# Patient Record
Sex: FEMALE | Race: WHITE | Employment: OTHER | URBAN - METROPOLITAN AREA
[De-identification: names, ages, dates, MRNs, and addresses within clinical notes are randomized per-mention and may not be internally consistent; named-entity substitution may affect disease eponyms.]

---

## 2020-07-06 ENCOUNTER — OFFICE VISIT (OUTPATIENT)
Dept: ORTHOPEDIC SURGERY | Age: 70
End: 2020-07-06
Payer: MEDICARE

## 2020-07-06 VITALS — TEMPERATURE: 98 F | HEIGHT: 66 IN | BODY MASS INDEX: 27.32 KG/M2 | WEIGHT: 170 LBS

## 2020-07-06 PROCEDURE — G8427 DOCREV CUR MEDS BY ELIG CLIN: HCPCS | Performed by: ORTHOPAEDIC SURGERY

## 2020-07-06 PROCEDURE — 4040F PNEUMOC VAC/ADMIN/RCVD: CPT | Performed by: ORTHOPAEDIC SURGERY

## 2020-07-06 PROCEDURE — G8400 PT W/DXA NO RESULTS DOC: HCPCS | Performed by: ORTHOPAEDIC SURGERY

## 2020-07-06 PROCEDURE — G8417 CALC BMI ABV UP PARAM F/U: HCPCS | Performed by: ORTHOPAEDIC SURGERY

## 2020-07-06 PROCEDURE — 1123F ACP DISCUSS/DSCN MKR DOCD: CPT | Performed by: ORTHOPAEDIC SURGERY

## 2020-07-06 PROCEDURE — 3017F COLORECTAL CA SCREEN DOC REV: CPT | Performed by: ORTHOPAEDIC SURGERY

## 2020-07-06 PROCEDURE — 99203 OFFICE O/P NEW LOW 30 MIN: CPT | Performed by: ORTHOPAEDIC SURGERY

## 2020-07-06 PROCEDURE — 1090F PRES/ABSN URINE INCON ASSESS: CPT | Performed by: ORTHOPAEDIC SURGERY

## 2020-07-06 PROCEDURE — 4004F PT TOBACCO SCREEN RCVD TLK: CPT | Performed by: ORTHOPAEDIC SURGERY

## 2020-07-06 RX ORDER — LEVOTHYROXINE SODIUM 0.1 MG/1
75 TABLET ORAL DAILY
COMMUNITY
Start: 2020-06-04 | End: 2021-11-03

## 2020-07-06 RX ORDER — LOSARTAN POTASSIUM 50 MG/1
50 TABLET ORAL DAILY
COMMUNITY
Start: 2020-05-28

## 2020-07-06 RX ORDER — ALENDRONATE SODIUM 70 MG/1
70 TABLET ORAL
COMMUNITY
Start: 2020-06-01

## 2020-07-06 RX ORDER — AMLODIPINE BESYLATE 10 MG/1
5 TABLET ORAL DAILY
COMMUNITY
Start: 2020-06-20 | End: 2021-11-03

## 2020-07-06 RX ORDER — SUMATRIPTAN 50 MG/1
TABLET, FILM COATED ORAL
COMMUNITY
Start: 2020-06-10

## 2020-07-06 NOTE — PROGRESS NOTES
Prashant Higgins is a 65 y.o. female, who presents   Chief Complaint   Patient presents with    Hip Pain     Right Hip, x 1 year, states of pain on the lateral side of the hip       HPI[de-identified] Right hip pain is been present for quite some time getting worse. It causes her to limp at times. She was previously living in [place] and [facility] and she did very well with that. She is interested in some relief for the right hip without the extent of surgery at this time. She may consider it later but would like more palliative treatment right now. Allergies; medications; past medical, surgical, family, and social history; and problem list have been reviewed today and updated as indicated in this encounter - see below following Ortho specifics. Musculoskeletal: Skin condition gross neurovascular function are good in the right lower extremity. The patient is able to single-leg stand on each side. She does have a little droop of the pelvis with right single leg stance. Her gait does favor the right lower extremity and she limps some. Her gait is at a moderate pace. With the patient seated on the exam table alignment of the legs is symmetric. The left knee which she complained some of as well has slight loss of extension. Her flexion is greater than 110 degrees. She has good collateral stability and good cruciate stability. There is little crepitus. Her calf is supple with no tenderness. Left hip range of motion is good with no pain. The right lower extremity shows a well-healed straight anterior midline scar from the knee replacement which is stable with an excellent range of motion. The right hip has limited range of motion primarily in internal rotation with some pain associated with this. Abduction is good, flexion is good. Radiologic Studies: Imaging of the right hip and 2 views shows some loss of joint space.   There also some cystic subchondral changes in the femoral head and the acetabulum. The contours of the femoral head and acetabulum are good indicating no collapse or erosion respectively. Degenerative changes are seen in the visualized lumbar spine. ASSESSMENT:  Elpidio Piña was seen today for hip pain. Diagnoses and all orders for this visit:    Right hip pain  -     IR INJ ARTHROGRAM HIP RIGHT; Future    Arthritis of right hip     Treatment alternatives were reviewed including medical and physical therapies, injections, and surgical options, expected risks benefits and likely outcome of each were discussed in detail, questions asked and answered and understood. We discussed treatment options including oral anti-inflammatories, physical therapy, intra-articular injection and hip replacement arthroplasty. Patient would like to stay away from surgery at this time and would prefer an injection to decrease the discomfort for period of time. Relative risks and benefits were discussed with her with good understanding. We will check her left knee further as indicated by symptoms. There is no indication for arthrocentesis at this time      PLAN: We will refer to interventional radiology for intra-articular right hip injection. There is no problem list on file for this patient. History reviewed. No pertinent past medical history. History reviewed. No pertinent surgical history. Current Outpatient Medications   Medication Sig Dispense Refill    metoprolol tartrate (LOPRESSOR) 25 MG tablet       losartan (COZAAR) 50 MG tablet       amLODIPine (NORVASC) 10 MG tablet       levothyroxine (SYNTHROID) 100 MCG tablet       metFORMIN (GLUCOPHAGE) 500 MG tablet       alendronate (FOSAMAX) 70 MG tablet       SUMAtriptan (IMITREX) 50 MG tablet        No current facility-administered medications for this visit.         Allergies   Allergen Reactions    Penicillins     Sulfa Antibiotics        Social History     Socioeconomic History    Marital status:      Spouse name: None    Number of children: None    Years of education: None    Highest education level: None   Occupational History    None   Social Needs    Financial resource strain: None    Food insecurity     Worry: None     Inability: None    Transportation needs     Medical: None     Non-medical: None   Tobacco Use    Smoking status: None   Substance and Sexual Activity    Alcohol use: None    Drug use: None    Sexual activity: None   Lifestyle    Physical activity     Days per week: None     Minutes per session: None    Stress: None   Relationships    Social connections     Talks on phone: None     Gets together: None     Attends Cheondoism service: None     Active member of club or organization: None     Attends meetings of clubs or organizations: None     Relationship status: None    Intimate partner violence     Fear of current or ex partner: None     Emotionally abused: None     Physically abused: None     Forced sexual activity: None   Other Topics Concern    None   Social History Narrative    None       History reviewed. No pertinent family history. Review of Systems:   As follows except as previously noted in HPI:  Constitutional: Negative for chills, diaphoresis,  fever   Respiratory: Negative for cough, shortness of breath and wheezing. Cardiovascular: Negative for chest pain and palpitations. Neurological: Negative for dizziness, syncope,   GI / : abdominal pain or cramping  Musculoskeletal: see HPI       Objective:   Physical Exam   Constitutional: Oriented to person, place, and time. and appears well-developed and well-nourished. :   Head: Normocephalic and atraumatic. Neck: Neck supple. Eyes: EOM are normal.   Pulmonary/Chest: Effort normal.  No respiratory distress, no wheezes. Neurological: Alert and oriented to person  Skin: Skin is warm and dry.                Frank Acosta DO    7/6/20  11:43 AM    All reasonable efforts have been made to minimize the risk of errors that may occur in the use of voice recognition and other electronic means of charting.

## 2020-07-13 DIAGNOSIS — M25.551 RIGHT HIP PAIN: Primary | ICD-10-CM

## 2020-07-15 ENCOUNTER — HOSPITAL ENCOUNTER (OUTPATIENT)
Age: 70
Discharge: HOME | End: 2020-07-17
Payer: MEDICARE

## 2020-07-15 PROCEDURE — U0003 INFECTIOUS AGENT DETECTION BY NUCLEIC ACID (DNA OR RNA); SEVERE ACUTE RESPIRATORY SYNDROME CORONAVIRUS 2 (SARS-COV-2) (CORONAVIRUS DISEASE [COVID-19]), AMPLIFIED PROBE TECHNIQUE, MAKING USE OF HIGH THROUGHPUT TECHNOLOGIES AS DESCRIBED BY CMS-2020-01-R: HCPCS

## 2020-07-17 LAB
SARS-COV-2: NOT DETECTED
SOURCE: NORMAL

## 2020-07-21 ENCOUNTER — HOSPITAL ENCOUNTER (OUTPATIENT)
Dept: INTERVENTIONAL RADIOLOGY/VASCULAR | Age: 70
Discharge: HOME | End: 2020-07-21
Payer: MEDICARE

## 2020-07-21 DIAGNOSIS — M25.551 RIGHT HIP PAIN: ICD-10-CM

## 2020-07-21 PROCEDURE — 6360000002 HC RX W HCPCS: Performed by: RADIOLOGY

## 2020-07-21 PROCEDURE — 6360000004 HC RX CONTRAST MEDICATION: Performed by: RADIOLOGY

## 2020-07-21 PROCEDURE — 77002 NEEDLE LOCALIZATION BY XRAY: CPT

## 2020-07-21 PROCEDURE — 20610 DRAIN/INJ JOINT/BURSA W/O US: CPT

## 2020-07-21 PROCEDURE — 2500000003 HC RX 250 WO HCPCS: Performed by: RADIOLOGY

## 2020-07-21 RX ORDER — TRIAMCINOLONE ACETONIDE 40 MG/ML
40 INJECTION, SUSPENSION INTRA-ARTICULAR; INTRAMUSCULAR ONCE
Status: COMPLETED | OUTPATIENT
Start: 2020-07-21 | End: 2020-07-21

## 2020-07-21 RX ORDER — BUPIVACAINE HYDROCHLORIDE 2.5 MG/ML
2 INJECTION, SOLUTION EPIDURAL; INFILTRATION; INTRACAUDAL ONCE
Status: COMPLETED | OUTPATIENT
Start: 2020-07-21 | End: 2020-07-21

## 2020-07-21 RX ADMIN — IOPAMIDOL 3 ML: 612 INJECTION, SOLUTION INTRATHECAL at 13:22

## 2020-07-21 RX ADMIN — BUPIVACAINE HYDROCHLORIDE 5 MG: 2.5 INJECTION, SOLUTION EPIDURAL; INFILTRATION; INTRACAUDAL at 13:22

## 2020-07-21 RX ADMIN — TRIAMCINOLONE ACETONIDE 40 MG: 40 INJECTION, SUSPENSION INTRA-ARTICULAR; INTRAMUSCULAR at 13:22

## 2020-07-21 ASSESSMENT — PAIN SCALES - GENERAL: PAINLEVEL_OUTOF10: 5

## 2020-07-21 NOTE — PROCEDURES
Rolo Bagley is a 65 y.o. female patient. 1. Right hip pain    2. Arthritis of right hip      No past medical history on file. There were no vitals taken for this visit. Procedures   Right hip joint injection. Chronic right hip pain. Steroid injection performed. EBL <2cc. No immediate complications. OK to discharge.     Maria Del Carmen Correa MD  7/21/2020

## 2020-12-04 ENCOUNTER — OFFICE VISIT (OUTPATIENT)
Dept: ORTHOPEDIC SURGERY | Age: 70
End: 2020-12-04
Payer: MEDICARE

## 2020-12-04 VITALS — TEMPERATURE: 98 F | BODY MASS INDEX: 27.32 KG/M2 | HEIGHT: 66 IN | WEIGHT: 170 LBS

## 2020-12-04 PROCEDURE — 99213 OFFICE O/P EST LOW 20 MIN: CPT | Performed by: ORTHOPAEDIC SURGERY

## 2020-12-04 PROCEDURE — 20610 DRAIN/INJ JOINT/BURSA W/O US: CPT | Performed by: ORTHOPAEDIC SURGERY

## 2020-12-04 PROCEDURE — 3017F COLORECTAL CA SCREEN DOC REV: CPT | Performed by: ORTHOPAEDIC SURGERY

## 2020-12-04 PROCEDURE — G8417 CALC BMI ABV UP PARAM F/U: HCPCS | Performed by: ORTHOPAEDIC SURGERY

## 2020-12-04 PROCEDURE — G8400 PT W/DXA NO RESULTS DOC: HCPCS | Performed by: ORTHOPAEDIC SURGERY

## 2020-12-04 PROCEDURE — 1090F PRES/ABSN URINE INCON ASSESS: CPT | Performed by: ORTHOPAEDIC SURGERY

## 2020-12-04 PROCEDURE — 1123F ACP DISCUSS/DSCN MKR DOCD: CPT | Performed by: ORTHOPAEDIC SURGERY

## 2020-12-04 PROCEDURE — G8484 FLU IMMUNIZE NO ADMIN: HCPCS | Performed by: ORTHOPAEDIC SURGERY

## 2020-12-04 PROCEDURE — 4040F PNEUMOC VAC/ADMIN/RCVD: CPT | Performed by: ORTHOPAEDIC SURGERY

## 2020-12-04 PROCEDURE — G8427 DOCREV CUR MEDS BY ELIG CLIN: HCPCS | Performed by: ORTHOPAEDIC SURGERY

## 2020-12-04 PROCEDURE — 4004F PT TOBACCO SCREEN RCVD TLK: CPT | Performed by: ORTHOPAEDIC SURGERY

## 2020-12-04 RX ORDER — AMLODIPINE BESYLATE 5 MG/1
TABLET ORAL
COMMUNITY
Start: 2020-11-21 | End: 2020-12-04

## 2020-12-04 RX ORDER — TRIAMCINOLONE ACETONIDE 40 MG/ML
40 INJECTION, SUSPENSION INTRA-ARTICULAR; INTRAMUSCULAR ONCE
Status: COMPLETED | OUTPATIENT
Start: 2020-12-04 | End: 2020-12-04

## 2020-12-04 RX ORDER — ATORVASTATIN CALCIUM 20 MG/1
20 TABLET, FILM COATED ORAL DAILY
COMMUNITY
Start: 2020-11-23 | End: 2022-06-21 | Stop reason: ALTCHOICE

## 2020-12-04 RX ADMIN — TRIAMCINOLONE ACETONIDE 40 MG: 40 INJECTION, SUSPENSION INTRA-ARTICULAR; INTRAMUSCULAR at 11:29

## 2020-12-04 NOTE — PROGRESS NOTES
discussed. injection of the right trochanteric bursa with Kenalog   (triamcinalone)         40mg and 1/4  % bupivicaine 4 mlwas discussed with the patient. Discussion included but was not limited to potential risks and benefits. No contraindications to the procedure were noted. Understanding and agreement was indicated. The procedure was accomplished without incident using appropriate aseptic technique. The patient did notice some immediate relief. follow up as needed    Return if symptoms worsen or fail to improve. Current Outpatient Medications   Medication Sig Dispense Refill    atorvastatin (LIPITOR) 20 MG tablet       metoprolol tartrate (LOPRESSOR) 25 MG tablet       losartan (COZAAR) 50 MG tablet       amLODIPine (NORVASC) 10 MG tablet       levothyroxine (SYNTHROID) 100 MCG tablet       metFORMIN (GLUCOPHAGE) 500 MG tablet       alendronate (FOSAMAX) 70 MG tablet       SUMAtriptan (IMITREX) 50 MG tablet        No current facility-administered medications for this visit. There is no problem list on file for this patient. History reviewed. No pertinent past medical history. History reviewed. No pertinent surgical history. Allergies   Allergen Reactions    Penicillins     Sulfa Antibiotics        Social History     Socioeconomic History    Marital status:       Spouse name: None    Number of children: None    Years of education: None    Highest education level: None   Occupational History    None   Social Needs    Financial resource strain: None    Food insecurity     Worry: None     Inability: None    Transportation needs     Medical: None     Non-medical: None   Tobacco Use    Smoking status: None   Substance and Sexual Activity    Alcohol use: None    Drug use: None    Sexual activity: None   Lifestyle    Physical activity     Days per week: None     Minutes per session: None    Stress: None   Relationships    Social connections     Talks on phone: None     Gets together: None     Attends Islam service: None     Active member of club or organization: None     Attends meetings of clubs or organizations: None     Relationship status: None    Intimate partner violence     Fear of current or ex partner: None     Emotionally abused: None     Physically abused: None     Forced sexual activity: None   Other Topics Concern    None   Social History Narrative    None       Review of Systems  As follows except as previously noted in HPI:  Constitutional: Negative for chills, diaphoresis, fatigue, fever and unexpected weight change. Respiratory: Negative for cough, shortness of breath and wheezing. Cardiovascular: Negative for chest pain and palpitations. Neurological: Negative for dizziness, syncope, cephalgia. GI / : negative  Musculoskeletal: see HPI       Objective:   Physical Exam   Constitutional: Oriented to person, place, and time. and appears well-developed and well-nourished. :   Head: Normocephalic and atraumatic. Eyes: EOM are normal.   Neck: Neck supple. Cardiovascular: Normal rate and regular rhythm. Pulmonary/Chest: Effort normal. No stridor. No respiratory distress, no wheezes. Abdominal:  No abnormal distension. Neurological: Alert and oriented to person, place, and time. Skin: Skin is warm and dry. Psychiatric: Normal mood and affect.  Behavior is normal. Thought content normal.    DONYA Lawler Must, DO    12/4/20  11:39 AM

## 2021-08-02 ENCOUNTER — OFFICE VISIT (OUTPATIENT)
Dept: ORTHOPEDIC SURGERY | Age: 71
End: 2021-08-02
Payer: MEDICARE

## 2021-08-02 VITALS — HEIGHT: 66 IN | WEIGHT: 170 LBS | BODY MASS INDEX: 27.32 KG/M2

## 2021-08-02 DIAGNOSIS — M25.562 ACUTE PAIN OF LEFT KNEE: Primary | ICD-10-CM

## 2021-08-02 DIAGNOSIS — M17.12 PRIMARY OSTEOARTHRITIS OF LEFT KNEE: Primary | ICD-10-CM

## 2021-08-02 PROCEDURE — G8427 DOCREV CUR MEDS BY ELIG CLIN: HCPCS | Performed by: ORTHOPAEDIC SURGERY

## 2021-08-02 PROCEDURE — 1123F ACP DISCUSS/DSCN MKR DOCD: CPT | Performed by: ORTHOPAEDIC SURGERY

## 2021-08-02 PROCEDURE — G8417 CALC BMI ABV UP PARAM F/U: HCPCS | Performed by: ORTHOPAEDIC SURGERY

## 2021-08-02 PROCEDURE — 1090F PRES/ABSN URINE INCON ASSESS: CPT | Performed by: ORTHOPAEDIC SURGERY

## 2021-08-02 PROCEDURE — 4040F PNEUMOC VAC/ADMIN/RCVD: CPT | Performed by: ORTHOPAEDIC SURGERY

## 2021-08-02 PROCEDURE — G8400 PT W/DXA NO RESULTS DOC: HCPCS | Performed by: ORTHOPAEDIC SURGERY

## 2021-08-02 PROCEDURE — 3017F COLORECTAL CA SCREEN DOC REV: CPT | Performed by: ORTHOPAEDIC SURGERY

## 2021-08-02 PROCEDURE — 1036F TOBACCO NON-USER: CPT | Performed by: ORTHOPAEDIC SURGERY

## 2021-08-02 PROCEDURE — 99213 OFFICE O/P EST LOW 20 MIN: CPT | Performed by: ORTHOPAEDIC SURGERY

## 2021-08-02 NOTE — PROGRESS NOTES
Ruel Marquez is a 66 y.o. female, who presents   Chief Complaint   Patient presents with    Knee Pain     patient here for left knee pain. patient states that she gets a lot of swelling in her left knee. HPI[de-identified] Left knee pain is been present for quite some time. Is apparently getting worse. Patient complains of lateral side pain and swelling. There is a lot of clicking in the knee and she sometimes has difficulty walking. She has had a right total knee replacement which was done in [place] when she was living there. She has had a very good result with that. Allergies; medications; past medical, surgical, family, and social history; and problem list have been reviewed today and updated as indicated in this encounter - see below following Ortho specifics. Musculoskeletal: Skin condition is a little bit splotchy but with no openings or acute inflammation. There is slight exaggeration of the normal valgus alignment. Ankle leg stance is possible with a little hesitation. Gait shows that the left knee does not fully extend during the stance and pushoff phases. There is slight medial lateral laxity with significant guarding. There is no evidence of collateral or cruciate ligament disruption. There is crepitus throughout the range of motion which is about 520 degrees. There is significant patellofemoral crepitus with mobilization of this joint individually. Her calf is supple without tenderness. Radiologic Studies: Imaging studies and 2 views with AP weightbearing shows slight decrease in the medial joint space. There are medial marginal hypertrophic changes. The worst where is patellofemoral with narrowing and a very large proximal osteophyte on the articular side. ASSESSMENT:  Joel Jones was seen today for knee pain.     Diagnoses and all orders for this visit:    Primary osteoarthritis of left knee     Treatment alternatives were reviewed including medical and physical therapies, injections, and surgical options, expected risks benefits and likely outcome of each were discussed in detail, questions asked and answered and understood. We discussed the symptoms as well as physical findings and imaging results. There is definitely advanced arthrosis particularly patellofemoral in the medial lateral compartments may in fact be worse than they appear to be on plain imaging. Treatment options including conservative measures through injection and surgical treatment were discussed. She ultimately said that she like to have it replaced in October and that would preclude an injection at this time because of the slight statistical increase in risk of infection for joint surgery within 3 months of injection. PLAN: We will schedule left total knee replacement in October at her request.        There is no problem list on file for this patient. History reviewed. No pertinent past medical history. History reviewed. No pertinent surgical history. Current Outpatient Medications   Medication Sig Dispense Refill    atorvastatin (LIPITOR) 20 MG tablet       metoprolol tartrate (LOPRESSOR) 25 MG tablet       losartan (COZAAR) 50 MG tablet       amLODIPine (NORVASC) 10 MG tablet       levothyroxine (SYNTHROID) 100 MCG tablet       metFORMIN (GLUCOPHAGE) 500 MG tablet       alendronate (FOSAMAX) 70 MG tablet       SUMAtriptan (IMITREX) 50 MG tablet        No current facility-administered medications for this visit. Allergies   Allergen Reactions    Penicillins     Sulfa Antibiotics        Social History     Socioeconomic History    Marital status:       Spouse name: None    Number of children: None    Years of education: None    Highest education level: None   Occupational History    None   Tobacco Use    Smoking status: Former Smoker     Quit date:      Years since quittin.6    Smokeless tobacco: Never Used   Substance and Sexual Activity    Alcohol use: Never    Drug use: Never    Sexual activity: None   Other Topics Concern    None   Social History Narrative    None     Social Determinants of Health     Financial Resource Strain:     Difficulty of Paying Living Expenses:    Food Insecurity:     Worried About Running Out of Food in the Last Year:     [address] in the Last Year:    Transportation Needs:     Lack of Transportation (Medical):  Lack of Transportation (Non-Medical):    Physical Activity:     Days of Exercise per Week:     Minutes of Exercise per Session:    Stress:     Feeling of Stress :    Social Connections:     Frequency of Communication with Friends and Family:     Frequency of Social Gatherings with Friends and Family:     Attends Bahai Services:     Active Member of Clubs or Organizations:     Attends Club or Organization Meetings:     Marital Status:    Intimate Partner Violence:     Fear of Current or Ex-Partner:     Emotionally Abused:     Physically Abused:     Sexually Abused:        History reviewed. No pertinent family history. Review of Systems:   As follows except as previously noted in HPI:  Constitutional: Negative for chills, diaphoresis,  fever   Respiratory: Negative for cough, shortness of breath and wheezing. Cardiovascular: Negative for chest pain and palpitations. Neurological: Negative for dizziness, syncope,   GI / : abdominal pain or cramping  Musculoskeletal: see HPI       Objective:   Physical Exam   Constitutional: Oriented to person, place, and time. and appears well-developed and well-nourished. :   Head: Normocephalic and atraumatic. Neck: Neck supple. Eyes: EOM are normal.   Pulmonary/Chest: Effort normal.  No respiratory distress, no wheezes. Neurological: Alert and oriented to person  Skin: Skin is warm and dry.                Krystle Pinto DO    8/2/21  12:18 PM    All reasonable efforts have been made to minimize the risk of errors that may occur in the use of voice recognition and other electronic means of charting.

## 2021-08-05 ENCOUNTER — ANESTHESIA EVENT (OUTPATIENT)
Dept: OPERATING ROOM | Age: 71
End: 2021-08-05
Payer: MEDICARE

## 2021-08-16 ASSESSMENT — LIFESTYLE VARIABLES: SMOKING_STATUS: 0

## 2021-08-16 NOTE — ANESTHESIA PRE PROCEDURE
Department of Anesthesiology  Preprocedure Note       Name:  Hever Ireland   Age:  66 y.o.  :  1950                                          MRN:  [id number]         Date:  2021      Surgeon: Rosalie Del Rosario):  Deedee Rodriguez DPM    Procedure: Procedure(s):  CORRECTION ANGULAR DEFORMITY RIGHT 5TH DIGIT, PARTIAL EXCISION OF BONE RIGHT 5TH DIGIT (CPT 27583)    Medications prior to admission:   Prior to Admission medications    Medication Sig Start Date End Date Taking? Authorizing Provider   atorvastatin (LIPITOR) 20 MG tablet 20 mg daily  20   Historical Provider, MD   metoprolol tartrate (LOPRESSOR) 25 MG tablet 25 mg 2 times daily  20   Historical Provider, MD   losartan (COZAAR) 50 MG tablet 50 mg daily  20   Historical Provider, MD   amLODIPine (NORVASC) 10 MG tablet 5 mg daily  20   Historical Provider, MD   levothyroxine (SYNTHROID) 100 MCG tablet 75 mcg Daily  20   Historical Provider, MD   metFORMIN (GLUCOPHAGE) 500 MG tablet 2 times daily (with meals)  20   Historical Provider, MD   alendronate (FOSAMAX) 70 MG tablet 70 mg every 7 days  20   Historical Provider, MD   SUMAtriptan (IMITREX) 50 MG tablet once as needed  6/10/20   Historical Provider, MD       Current medications:    No current facility-administered medications for this encounter. Current Outpatient Medications   Medication Sig Dispense Refill    atorvastatin (LIPITOR) 20 MG tablet 20 mg daily       metoprolol tartrate (LOPRESSOR) 25 MG tablet 25 mg 2 times daily       losartan (COZAAR) 50 MG tablet 50 mg daily       amLODIPine (NORVASC) 10 MG tablet 5 mg daily       levothyroxine (SYNTHROID) 100 MCG tablet 75 mcg Daily       metFORMIN (GLUCOPHAGE) 500 MG tablet 2 times daily (with meals)       alendronate (FOSAMAX) 70 MG tablet 70 mg every 7 days       SUMAtriptan (IMITREX) 50 MG tablet once as needed          Allergies:     Allergies   Allergen Reactions    Penicillins     Sulfa Antibiotics        Problem List:  There is no problem list on file for this patient. Past Medical History:        Diagnosis Date    Diabetes mellitus ([address]     Hyperlipidemia     Hypertension     PONV (postoperative nausea and vomiting)     Thyroid disease        Past Surgical History:        Procedure Laterality Date    BLADDER SUSPENSION  2016    FOOT SURGERY Bilateral     bunions, hammer toes      HYSTERECTOMY  1999    JOINT REPLACEMENT Right 2018    done in [place]   knee       Social History:    Social History     Tobacco Use    Smoking status: Former Smoker     Quit date:      Years since quittin.6    Smokeless tobacco: Never Used   Substance Use Topics    Alcohol use: Never                                Counseling given: Not Answered      Vital Signs (Current):   Vitals:    21 1534   Weight: 170 lb (77.1 kg)   Height: 5' 6\" (1.676 m)                                              BP Readings from Last 3 Encounters:   No data found for BP       NPO Status:                                                                                 BMI:   Wt Readings from Last 3 Encounters:   21 170 lb (77.1 kg)   20 170 lb (77.1 kg)   20 170 lb (77.1 kg)     Body mass index is 27.44 kg/m². CBC: No results found for: WBC, RBC, HGB, HCT, MCV, RDW, PLT    CMP: No results found for: NA, K, CL, CO2, BUN, CREATININE, GFRAA, AGRATIO, LABGLOM, GLUCOSE, PROT, CALCIUM, BILITOT, ALKPHOS, AST, ALT    POC Tests: No results for input(s): POCGLU, POCNA, POCK, POCCL, POCBUN, POCHEMO, POCHCT in the last 72 hours.     Coags: No results found for: PROTIME, INR, APTT    HCG (If Applicable): No results found for: PREGTESTUR, PREGSERUM, HCG, HCGQUANT     ABGs: No results found for: PHART, PO2ART, JXX0ABA, JGF3IYU, BEART, [id number]     Type & Screen (If Applicable):  No results found for: LABABO, LABRH    Drug/Infectious Status (If Applicable):  No results found for: HIV, HEPCAB    COVID-19 Screening (If Applicable):   Lab Results   Component Value Date    COVID19 Not Detected 07/15/2020           Anesthesia Evaluation  Patient summary reviewed   history of anesthetic complications: PONV. Airway: Mallampati: I  TM distance: >3 FB   Neck ROM: full  Mouth opening: > = 3 FB Dental:          Pulmonary: breath sounds clear to auscultation      (-) not a current smoker (ex smoker)                           Cardiovascular:    (+) hypertension:, hyperlipidemia      ECG reviewed  Rhythm: regular  Rate: normal           Beta Blocker:  Dose within 24 Hrs      ROS comment: EKG=slow SR      Cleared by PCP     Neuro/Psych:   Negative Neuro/Psych ROS              GI/Hepatic/Renal: Neg GI/Hepatic/Renal ROS            Endo/Other:    (+) DiabetesType II DM, , hypothyroidism::., .                 Abdominal:             Vascular: negative vascular ROS. Other Findings: Upper Partial.          Anesthesia Plan      MAC     ASA 2     (Hx PONV  PCP clearance on chart)  Induction: intravenous. MIPS: Prophylactic antiemetics administered. Anesthetic plan and risks discussed with patient. Plan discussed with CRNA.     Attending anesthesiologist reviewed and agrees with Osiris Alves MD   8/16/2021

## 2021-08-17 ENCOUNTER — HOSPITAL ENCOUNTER (OUTPATIENT)
Dept: OPERATING ROOM | Age: 71
Setting detail: OUTPATIENT SURGERY
Discharge: HOME | End: 2021-08-17
Attending: PODIATRIST
Payer: MEDICARE

## 2021-08-17 ENCOUNTER — ANESTHESIA (OUTPATIENT)
Dept: OPERATING ROOM | Age: 71
End: 2021-08-17
Payer: MEDICARE

## 2021-08-17 ENCOUNTER — HOSPITAL ENCOUNTER (OUTPATIENT)
Age: 71
Setting detail: OUTPATIENT SURGERY
Discharge: HOME | End: 2021-08-17
Attending: PODIATRIST | Admitting: PODIATRIST
Payer: MEDICARE

## 2021-08-17 VITALS
RESPIRATION RATE: 15 BRPM | DIASTOLIC BLOOD PRESSURE: 75 MMHG | OXYGEN SATURATION: 100 % | SYSTOLIC BLOOD PRESSURE: 127 MMHG

## 2021-08-17 VITALS
HEIGHT: 66 IN | TEMPERATURE: 97 F | BODY MASS INDEX: 26.2 KG/M2 | HEART RATE: 68 BPM | SYSTOLIC BLOOD PRESSURE: 126 MMHG | OXYGEN SATURATION: 96 % | WEIGHT: 163 LBS | RESPIRATION RATE: 14 BRPM | DIASTOLIC BLOOD PRESSURE: 56 MMHG

## 2021-08-17 DIAGNOSIS — M20.5X1 CURLY TOE, ACQUIRED, RIGHT: ICD-10-CM

## 2021-08-17 DIAGNOSIS — M79.671 RIGHT FOOT PAIN: ICD-10-CM

## 2021-08-17 DIAGNOSIS — M21.621 TAILOR'S BUNIONETTE, RIGHT: Primary | ICD-10-CM

## 2021-08-17 LAB — METER GLUCOSE: 157 MG/DL (ref 74–99)

## 2021-08-17 PROCEDURE — 7100000011 HC PHASE II RECOVERY - ADDTL 15 MIN: Performed by: PODIATRIST

## 2021-08-17 PROCEDURE — 2500000003 HC RX 250 WO HCPCS: Performed by: NURSE ANESTHETIST, CERTIFIED REGISTERED

## 2021-08-17 PROCEDURE — 7100000010 HC PHASE II RECOVERY - FIRST 15 MIN: Performed by: PODIATRIST

## 2021-08-17 PROCEDURE — 2709999900 HC NON-CHARGEABLE SUPPLY: Performed by: PODIATRIST

## 2021-08-17 PROCEDURE — 3209999900 FLUORO FOR SURGICAL PROCEDURES

## 2021-08-17 PROCEDURE — 3600000013 HC SURGERY LEVEL 3 ADDTL 15MIN: Performed by: PODIATRIST

## 2021-08-17 PROCEDURE — 2500000003 HC RX 250 WO HCPCS: Performed by: PODIATRIST

## 2021-08-17 PROCEDURE — 82962 GLUCOSE BLOOD TEST: CPT

## 2021-08-17 PROCEDURE — 6360000002 HC RX W HCPCS: Performed by: NURSE ANESTHETIST, CERTIFIED REGISTERED

## 2021-08-17 PROCEDURE — 6360000002 HC RX W HCPCS: Performed by: PODIATRIST

## 2021-08-17 PROCEDURE — 2580000003 HC RX 258: Performed by: ANESTHESIOLOGY

## 2021-08-17 PROCEDURE — 3700000001 HC ADD 15 MINUTES (ANESTHESIA): Performed by: PODIATRIST

## 2021-08-17 PROCEDURE — 2580000003 HC RX 258: Performed by: PODIATRIST

## 2021-08-17 PROCEDURE — 3600000003 HC SURGERY LEVEL 3 BASE: Performed by: PODIATRIST

## 2021-08-17 PROCEDURE — 3700000000 HC ANESTHESIA ATTENDED CARE: Performed by: PODIATRIST

## 2021-08-17 RX ORDER — MIDAZOLAM HYDROCHLORIDE 1 MG/ML
INJECTION INTRAMUSCULAR; INTRAVENOUS PRN
Status: DISCONTINUED | OUTPATIENT
Start: 2021-08-17 | End: 2021-08-17 | Stop reason: SDUPTHER

## 2021-08-17 RX ORDER — HYDROCODONE BITARTRATE AND ACETAMINOPHEN 5; 325 MG/1; MG/1
1 TABLET ORAL EVERY 4 HOURS PRN
Qty: 35 TABLET | Refills: 0 | Status: SHIPPED | OUTPATIENT
Start: 2021-08-17 | End: 2021-08-24

## 2021-08-17 RX ORDER — FENTANYL CITRATE 50 UG/ML
INJECTION, SOLUTION INTRAMUSCULAR; INTRAVENOUS PRN
Status: DISCONTINUED | OUTPATIENT
Start: 2021-08-17 | End: 2021-08-17 | Stop reason: SDUPTHER

## 2021-08-17 RX ORDER — LIDOCAINE HYDROCHLORIDE 20 MG/ML
INJECTION, SOLUTION EPIDURAL; INFILTRATION; INTRACAUDAL; PERINEURAL PRN
Status: DISCONTINUED | OUTPATIENT
Start: 2021-08-17 | End: 2021-08-17 | Stop reason: SDUPTHER

## 2021-08-17 RX ORDER — ONDANSETRON 2 MG/ML
INJECTION INTRAMUSCULAR; INTRAVENOUS PRN
Status: DISCONTINUED | OUTPATIENT
Start: 2021-08-17 | End: 2021-08-17 | Stop reason: SDUPTHER

## 2021-08-17 RX ORDER — SODIUM CHLORIDE, SODIUM LACTATE, POTASSIUM CHLORIDE, CALCIUM CHLORIDE 600; 310; 30; 20 MG/100ML; MG/100ML; MG/100ML; MG/100ML
INJECTION, SOLUTION INTRAVENOUS CONTINUOUS
Status: DISCONTINUED | OUTPATIENT
Start: 2021-08-17 | End: 2021-08-17 | Stop reason: HOSPADM

## 2021-08-17 RX ORDER — BUPIVACAINE HYDROCHLORIDE 5 MG/ML
INJECTION, SOLUTION EPIDURAL; INTRACAUDAL PRN
Status: DISCONTINUED | OUTPATIENT
Start: 2021-08-17 | End: 2021-08-17 | Stop reason: ALTCHOICE

## 2021-08-17 RX ORDER — PROPOFOL 10 MG/ML
INJECTION, EMULSION INTRAVENOUS CONTINUOUS PRN
Status: DISCONTINUED | OUTPATIENT
Start: 2021-08-17 | End: 2021-08-17 | Stop reason: SDUPTHER

## 2021-08-17 RX ADMIN — LIDOCAINE HYDROCHLORIDE 50 MG: 20 INJECTION, SOLUTION EPIDURAL; INFILTRATION; INTRACAUDAL; PERINEURAL at 10:21

## 2021-08-17 RX ADMIN — PROPOFOL INJECTABLE EMULSION 70 MCG/KG/MIN: 10 INJECTION, EMULSION INTRAVENOUS at 10:21

## 2021-08-17 RX ADMIN — FENTANYL CITRATE 50 MCG: 50 INJECTION, SOLUTION INTRAMUSCULAR; INTRAVENOUS at 10:21

## 2021-08-17 RX ADMIN — SODIUM CHLORIDE, POTASSIUM CHLORIDE, SODIUM LACTATE AND CALCIUM CHLORIDE: 600; 310; 30; 20 INJECTION, SOLUTION INTRAVENOUS at 08:32

## 2021-08-17 RX ADMIN — VANCOMYCIN HYDROCHLORIDE 500 MG: 500 INJECTION, POWDER, LYOPHILIZED, FOR SOLUTION INTRAVENOUS at 10:14

## 2021-08-17 RX ADMIN — FENTANYL CITRATE 50 MCG: 50 INJECTION, SOLUTION INTRAMUSCULAR; INTRAVENOUS at 10:34

## 2021-08-17 RX ADMIN — ONDANSETRON 4 MG: 2 INJECTION INTRAMUSCULAR; INTRAVENOUS at 11:39

## 2021-08-17 RX ADMIN — MIDAZOLAM 2 MG: 1 INJECTION INTRAMUSCULAR; INTRAVENOUS at 10:21

## 2021-08-17 ASSESSMENT — PULMONARY FUNCTION TESTS
PIF_VALUE: 0
PIF_VALUE: 0

## 2021-08-17 ASSESSMENT — PAIN SCALES - GENERAL
PAINLEVEL_OUTOF10: 0

## 2021-08-17 NOTE — OP NOTE
Operative Note      Patient: Betty Araiza  YOB: 1950  MRN: [id number]    Date of Procedure: 8/17/2021    Pre-Op Diagnosis: 1. 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 Tailor's bunion of the right foot    Post-Op Diagnosis: Same       Procedure(s):  CORRECTION ANGULAR DEFORMITY RIGHT 5TH DIGIT, PARTIAL EXCISION OF BONE RIGHT 5TH DIGIT (CPT 89142)     Procedures: 1. Derotational arthroplasty of the right fifth toe 2. Resection of lateral eminence of fifth metatarsal head for correction of tailor's bunion deformity    Surgeon(s):  Kirstie Dupree DPM    Assistant:   Resident: Johnny Ulloa DPM    Anesthesia: Monitor Anesthesia Care    Estimated Blood Loss (mL): 15 cc    Complications: None    Specimens:   * No specimens in log *    Implants:  * No implants in log *      Drains: * No LDAs found *    Findings: See operative report    Indications for the procedure: This patient is a pleasant 76-year-old female who follows with Dr. Tiana Arriola for management of pain associated with her right fifth toe. She does have obvious adductovarus deformity present to her fifth toe and she complains of discomfort with ambulation and certain types of shoe gear. Her symptoms also affect the lateral aspect of her fifth metatarsal head as she does have a clinically and radiographically evident tailor's bunion deformity. She has in tact neurovascular status. She has failed all conservative treatment measures which include but are not limited to corticosteroids injections, orthotics and immobilization methods. Therefore, it was recommended to her to elect for surgical intervention in order to correct the deformity associated with the fifth toe and resect the prominent lateral aspect of her fifth metatarsal head the best treat her symptoms. All indications for the procedure were discussed with the patient in great detail prior to surgery. All anticipated benefits were also discussed as well.   The patient was made aware of all risks and possible complications associate with surgery and anesthesia as well. All questions were answered to the patient's satisfaction. No guarantees were made pertaining to the outcome of the surgery. Possible complications associated with the surgery include but are not limited to postoperative infection, postoperative pain, postoperative nerve injury, postoperative CRPS/RSDS, postoperative DVT, surgical site infection, incisional dehiscence, recurrence of deformity and need for revisional procedures. Given all this information, the patient elected to proceed with surgery. Surgical consent was obtained and signed in the preoperative holding area and the right foot was marked as the correct operative site in the preop holding area. Procedure in detail:    After interfacing with all appropriate monitors in the preoperative holding area, the patient was wheeled from the preoperative area to the operative room by the operating staff and laid on the operating room table in the supine position. Monitored anesthesia care was then administered by the anesthesia team.  Once the patient was under the appropriate plane of anesthesia, a timeout was called which included proper patient procedure and extremity identification. All parties present agreed. The right lower extremities and scrubbed prepped and draped in the usual aseptic manner and lower down the operative field. It was at this time that a local anesthetic block consisting of 10 cc of a one-to-one mixture of 0.5 percent Marcaine plain and 1% lidocaine was administered to the area just mid fifth metatarsal area. No tourniquet was used for hemostasis. Attention was then directed to the fifth toe the right foot where we plan to perform a derotational arthroplasty. Our incision was planned in a way that would aid the reduction of her deformity.   Therefore, an elliptical incision was planned using a marking pen oriented distal medial to proximal lateral with the proximal lateral aspect of this ellipse aligning with our apex of the rotational deformity present. Care was taken to ensure that this elliptical incision would offer adequate exposure of the distal proximal phalanx of the fifth digit. #15 blade was then used to dissect through the cutaneous layer in order to pass this ellipse of skin off the operative field. Careful anatomic dissection was then carried through the subcutaneous and fascial layers of the fifth digit down to the level of the bone at the proximal interphalangeal joint. Blunt and sharp dissection methods were utilized during this process and care was taken to cauterize and ligating bleeders as necessary as well as retract and protect any vital neurovascular structures. All soft tissue attachments were then released from the proximal interphalangeal joint. A sagittal saw was then used to make a having elliptical osteotomy of the distal lateral aspect of the proximal phalanx of the fifth digit. The respective bone was freed from remaining soft tissue attachments and passed off the operative field. A bone rongeur and hand rasp were utilized in order to contour any remaining sharp edges or osseous ledges. Fluoroscopic guidance was then confirmed to ensure adequate osteotomy and anatomic contour. The surgical site was then flushed with copious amounts normal sterile saline. The fascial layers with improved reapproximate using 3-0 Vicryl. Skin was then reapproximated using 3-0 Prolene. Upon final closure of the skin excellent correction of the deformity associate with the fifth toe was accomplished. Attention was directed then just proximal to the base of the fifth toe to the lateral aspect of the fifth metatarsal head. Approximately 2 cm incision was made through the cutaneous layers using a #15 blade.   Careful anatomic blunt and sharp dissection methods were carried through the subcutaneous, fascial and capsular layers of the fifth metatarsophalangeal joint in order to expose the lateral aspect of the fifth metatarsal head. Care was taken to cauterize and ligating bleeders as necessary. Care was also taken to retract protect any vital neurovascular structures. Upon adequate exposure of the fifth metatarsal head, a sagittal saw was then utilized in order to resect the prominent lateral eminence from this anatomic site. Bone rongeur and hand rasp were then used to contour these anatomic areas. Adequate resection of this prominent area was then confirmed under fluoroscopy. Clinically it was appreciable at the lateral eminence was properly resected as well. Was at this time the surgical site was then flushed with copious amounts normal sterile saline. The capsule was then reapproximated using 3-0 Vicryl, fascia layers were then reapproximated using 3-0 Vicryl as well. The skin was then reapproximated using 3-0 Prolene. Another local anesthetic block consisting of 10 cc of the mixture noted above was then administered to the area just proximal to the surgical site. A 1 cc injection of Decadron was administered to the surgical site as well. Excellent hemostasis was maintained throughout the procedure. The surgical sites were then dressed with Xeroform 4 x 4 gauze. Carmen and an Ace bandage was then applied to the right foot as well. A postop shoe was also applied to the right foot. The patient tolerated the procedure and anesthesia very well and was transferred to PACU with vital signs stable and vascular status intact to all digits of the right foot. After short period of postoperative monitoring the patient may be discharged home. She has been given both clear oral and written postoperative instructions. She is to take postoperative pain medication as prescribed. She is to keep her postoperative dressing clean dry and intact at all times.   She may weight-bear as tolerable to her right foot while protected in her postop shoe. She is to follow-up with Dr. Abhinav Phillips within 3 days of surgery in the office.         Electronically signed by Tori Jones DPM on 8/17/2021 at 4:15 PM

## 2021-08-17 NOTE — H&P
Patient Magalie Harrison is a 67 yo female     Chief Complaint of:  pain, tenderness and deformity to right 5th toe     Present Illness:  consistant with diagnosis      Past Medical History:        Diagnosis Date    Diabetes mellitus ([address]     Hyperlipidemia     Hypertension     PONV (postoperative nausea and vomiting)     Thyroid disease    ·     Past Surgical History:        Procedure Laterality Date    BLADDER SUSPENSION  2016    FOOT SURGERY Bilateral     bunions, hammer toes      HYSTERECTOMY  02/1999    JOINT REPLACEMENT Right 05/2018    done in [place]   knee   ·     Medications Prior to Admission:    · No medications prior to admission. Allergies:  Penicillins and Sulfa antibiotics    Social History:   · TOBACCO:   reports that she quit smoking about 35 years ago. She has never used smokeless tobacco.  · ETOH:   reports no history of alcohol use. DRUGS:   Social History     Substance and Sexual Activity   Drug Use Never   ·     Family History:   · History reviewed. No pertinent family history. Vitals:    Ht 5' 6\" (1.676 m)   Wt 170 lb (77.1 kg)   BMI 27.44 kg/m²     LABS:   No results for input(s): WBC, HGB, HCT, PLT in the last 72 hours. PHYSICAL EXAMINATION / GENERAL APPEARANCE:     HEAD: negative        HEART: negative     LUNGS: deferred     ABDOMEN: exam deferred     OTHER SIGNIFICANT FINDINGS:  N/A     IMPRESSION/INITIAL DIAGNOSIS:  Prominent bony exostosis appreciable to dorsal left foot which is tender to palpation. Parasthesia illicited with percussion over course of intermediate dorsal cutaneous nerve on left foot.

## 2021-08-17 NOTE — ANESTHESIA POSTPROCEDURE EVALUATION
Department of Anesthesiology  Postprocedure Note    Patient: Mariana Garcia  MRN: [id number]  YOB: 1950  Date of evaluation: 8/17/2021  Time:  1:13 PM     Procedure Summary     Date: 08/17/21 Room / Location: [address] [place] [address] 03 / [facility]    Anesthesia Start:  Anesthesia Stop: 3893    Procedure: CORRECTION ANGULAR DEFORMITY RIGHT 5TH DIGIT, PARTIAL EXCISION OF BONE RIGHT 5TH DIGIT (CPT 54687) (Right ) Diagnosis: (DEFORMED TOE RIGHT 5TH DIGIT, EXOSTOSIS RIGHT 5TH DIGIT)    Surgeons: Maureen Plummer DPM Responsible Provider: Mihai Mcpherson MD    Anesthesia Type: MAC ASA Status: 2          Anesthesia Type: MAC    Melania Phase I: Melania Score: 10    Melania Phase II: Melania Score: 10    Last vitals: Reviewed and per EMR flowsheets.        Anesthesia Post Evaluation    Patient location during evaluation: PACU  Patient participation: complete - patient participated  Level of consciousness: awake  Pain score: 0  Airway patency: patent  Nausea & Vomiting: no nausea  Complications: no  Cardiovascular status: blood pressure returned to baseline  Respiratory status: acceptable  Hydration status: euvolemic

## 2021-08-17 NOTE — BRIEF OP NOTE
Brief Postoperative Note      Patient: Ruel Marquez  YOB: 1950  MRN: [id number]    Date of Procedure: 8/17/2021    Pre-Op Diagnosis: DEFORMED TOE RIGHT 5TH DIGIT, EXOSTOSIS RIGHT 5TH DIGIT    Post-Op Diagnosis: Same       Procedure(s):  CORRECTION ANGULAR DEFORMITY RIGHT 5TH DIGIT, PARTIAL EXCISION OF BONE RIGHT 5TH DIGIT (CPT 22704)    Surgeon(s):  Ramiro Morris DPM    Assistant:  Resident: Mono Bowie DPM    Anesthesia: Monitor Anesthesia Care    Estimated Blood Loss (mL): Minimal    Complications: None    Specimens:   * No specimens in log *    Implants:  * No implants in log *      Drains: * No LDAs found *    Findings: see operative report    Electronically signed by Mono Bowie DPM on 8/17/2021 at 11:36 AM

## 2021-10-01 RX ORDER — SODIUM CHLORIDE 9 MG/ML
INJECTION, SOLUTION INTRAVENOUS CONTINUOUS
Status: CANCELLED | OUTPATIENT
Start: 2021-10-01

## 2021-10-04 ENCOUNTER — ANESTHESIA EVENT (OUTPATIENT)
Dept: OPERATING ROOM | Age: 71
End: 2021-10-04
Payer: MEDICARE

## 2021-10-04 ENCOUNTER — HOSPITAL ENCOUNTER (OUTPATIENT)
Dept: PREADMISSION TESTING | Age: 71
Discharge: HOME | End: 2021-10-04
Payer: MEDICARE

## 2021-10-04 VITALS
OXYGEN SATURATION: 98 % | HEART RATE: 65 BPM | TEMPERATURE: 97.4 F | BODY MASS INDEX: 26.84 KG/M2 | DIASTOLIC BLOOD PRESSURE: 64 MMHG | RESPIRATION RATE: 18 BRPM | HEIGHT: 66 IN | SYSTOLIC BLOOD PRESSURE: 135 MMHG | WEIGHT: 167 LBS

## 2021-10-04 DIAGNOSIS — Z01.818 PRE-OP TESTING: Primary | ICD-10-CM

## 2021-10-04 LAB
ABO/RH: NORMAL
ALBUMIN SERPL-MCNC: 4 G/DL (ref 3.5–5.2)
ALP BLD-CCNC: 170 U/L (ref 35–104)
ALT SERPL-CCNC: 27 U/L (ref 0–32)
ANION GAP SERPL CALCULATED.3IONS-SCNC: 9 MMOL/L (ref 7–16)
ANTIBODY SCREEN: NORMAL
APTT: 29.2 SEC (ref 24.5–35.1)
AST SERPL-CCNC: 27 U/L (ref 0–31)
BACTERIA: ABNORMAL /HPF
BASOPHILS ABSOLUTE: 0.06 E9/L (ref 0–0.2)
BASOPHILS RELATIVE PERCENT: 0.9 % (ref 0–2)
BILIRUB SERPL-MCNC: 0.4 MG/DL (ref 0–1.2)
BILIRUBIN URINE: NEGATIVE
BLOOD, URINE: NEGATIVE
BUN BLDV-MCNC: 16 MG/DL (ref 6–23)
CALCIUM SERPL-MCNC: 9.3 MG/DL (ref 8.6–10.2)
CHLORIDE BLD-SCNC: 103 MMOL/L (ref 98–107)
CLARITY: CLEAR
CO2: 27 MMOL/L (ref 22–29)
COLOR: YELLOW
CREAT SERPL-MCNC: 0.8 MG/DL (ref 0.5–1)
EOSINOPHILS ABSOLUTE: 0.38 E9/L (ref 0.05–0.5)
EOSINOPHILS RELATIVE PERCENT: 6 % (ref 0–6)
EPITHELIAL CELLS, UA: ABNORMAL /HPF
GFR AFRICAN AMERICAN: >60
GFR NON-AFRICAN AMERICAN: >60 ML/MIN/1.73
GLUCOSE BLD-MCNC: 199 MG/DL (ref 74–99)
GLUCOSE URINE: NEGATIVE MG/DL
HCT VFR BLD CALC: 41.6 % (ref 34–48)
HEMOGLOBIN: 13.8 G/DL (ref 11.5–15.5)
IMMATURE GRANULOCYTES #: 0.02 E9/L
IMMATURE GRANULOCYTES %: 0.3 % (ref 0–5)
INR BLD: 0.9
KETONES, URINE: NEGATIVE MG/DL
LEUKOCYTE ESTERASE, URINE: ABNORMAL
LYMPHOCYTES ABSOLUTE: 0.97 E9/L (ref 1.5–4)
LYMPHOCYTES RELATIVE PERCENT: 15.3 % (ref 20–42)
MCH RBC QN AUTO: 31.8 PG (ref 26–35)
MCHC RBC AUTO-ENTMCNC: 33.2 % (ref 32–34.5)
MCV RBC AUTO: 95.9 FL (ref 80–99.9)
MONOCYTES ABSOLUTE: 0.58 E9/L (ref 0.1–0.95)
MONOCYTES RELATIVE PERCENT: 9.1 % (ref 2–12)
NEUTROPHILS ABSOLUTE: 4.35 E9/L (ref 1.8–7.3)
NEUTROPHILS RELATIVE PERCENT: 68.4 % (ref 43–80)
NITRITE, URINE: POSITIVE
PDW BLD-RTO: 12.4 FL (ref 11.5–15)
PH UA: 6 (ref 5–9)
PLATELET # BLD: 151 E9/L (ref 130–450)
PMV BLD AUTO: 11.7 FL (ref 7–12)
POTASSIUM REFLEX MAGNESIUM: 4 MMOL/L (ref 3.5–5)
PROTEIN UA: NEGATIVE MG/DL
PROTHROMBIN TIME: 10.8 SEC (ref 9.3–12.4)
RBC # BLD: 4.34 E12/L (ref 3.5–5.5)
RBC UA: ABNORMAL /HPF (ref 0–2)
SODIUM BLD-SCNC: 139 MMOL/L (ref 132–146)
SPECIFIC GRAVITY UA: 1.01 (ref 1–1.03)
TOTAL PROTEIN: 7.6 G/DL (ref 6.4–8.3)
UROBILINOGEN, URINE: 0.2 E.U./DL
WBC # BLD: 6.4 E9/L (ref 4.5–11.5)
WBC UA: ABNORMAL /HPF (ref 0–5)

## 2021-10-04 PROCEDURE — 85610 PROTHROMBIN TIME: CPT

## 2021-10-04 PROCEDURE — 93005 ELECTROCARDIOGRAM TRACING: CPT | Performed by: ANESTHESIOLOGY

## 2021-10-04 PROCEDURE — 86901 BLOOD TYPING SEROLOGIC RH(D): CPT

## 2021-10-04 PROCEDURE — 85730 THROMBOPLASTIN TIME PARTIAL: CPT

## 2021-10-04 PROCEDURE — 81001 URINALYSIS AUTO W/SCOPE: CPT

## 2021-10-04 PROCEDURE — 87186 SC STD MICRODIL/AGAR DIL: CPT

## 2021-10-04 PROCEDURE — 87081 CULTURE SCREEN ONLY: CPT

## 2021-10-04 PROCEDURE — 87088 URINE BACTERIA CULTURE: CPT

## 2021-10-04 PROCEDURE — 86900 BLOOD TYPING SEROLOGIC ABO: CPT

## 2021-10-04 PROCEDURE — 86850 RBC ANTIBODY SCREEN: CPT

## 2021-10-04 PROCEDURE — 36415 COLL VENOUS BLD VENIPUNCTURE: CPT

## 2021-10-04 PROCEDURE — 80053 COMPREHEN METABOLIC PANEL: CPT

## 2021-10-04 PROCEDURE — 85025 COMPLETE CBC W/AUTO DIFF WBC: CPT

## 2021-10-04 ASSESSMENT — LIFESTYLE VARIABLES: SMOKING_STATUS: 0

## 2021-10-04 ASSESSMENT — PAIN DESCRIPTION - ORIENTATION: ORIENTATION: LEFT

## 2021-10-04 ASSESSMENT — PAIN SCALES - GENERAL: PAINLEVEL_OUTOF10: 7

## 2021-10-04 ASSESSMENT — PAIN DESCRIPTION - LOCATION: LOCATION: KNEE

## 2021-10-04 ASSESSMENT — PAIN DESCRIPTION - PAIN TYPE: TYPE: CHRONIC PAIN

## 2021-10-04 NOTE — PROGRESS NOTES
[address] [facility]                                                                                                                    PRE OP INSTRUCTIONS FOR  Sampson Holcomb        Date: 10/4/2021    Date of surgery: 10-12-21   Arrival Time: Hospital will call you between 5pm and 7pm on 10-11-21 with your final arrival time for surgery    1. Do not eat or drink anything after midnight prior to surgery. This includes no water, chewing gum, mints or ice chips. 2. Take the following medications with a small sip of water on the morning of Surgery: metoprolol, amlodipine, levothyroxine       3. Diabetics may take evening dose of insulin but none after midnight. If you feel symptomatic or low blood sugar morning of surgery drink 1-2 ounces of apple juice only. 4. Aspirin, Ibuprofen, Advil, Naproxen, Vitamin E and other Anti-inflammatory products should be stopped  before surgery  as directed by your physician. Take Tylenol only unless instructed otherwise by your surgeon. 5. Check with your Doctor regarding stopping Plavix, Coumadin, Lovenox, Eliquis, Effient, or other blood thinners. 6. Do not smoke,use illicit drugs and do not drink any alcoholic beverages 24 hours prior to surgery. 7. You may brush your teeth the morning of surgery. DO NOT SWALLOW WATER    8. You MUST make arrangements for a responsible adult to take you home after your surgery. You will not be allowed to leave alone or drive yourself home. It is strongly suggested someone stay with you the first 24 hrs. Your surgery will be cancelled if you do not have a ride home. 9. PEDIATRIC PATIENTS ONLY:  A parent/legal guardian must accompany a child scheduled for surgery and plan to stay at the hospital until the child is discharged. Please do not bring other children with you.     10. Please wear simple, loose fitting clothing to the hospital.  Do not bring valuables (money, credit cards, checkbooks, etc.) Do not wear

## 2021-10-04 NOTE — ANESTHESIA PRE PROCEDURE
Department of Anesthesiology  Preprocedure Note       Name:  Yo Lawson   Age:  66 y.o.  :  1950                                          MRN:  [id number]         Date:  10/4/2021      Surgeon: Judith Valentine):  DONYA Ratliff DO    Procedure: Procedure(s):  TOTAL LEFT KNEE REPLACEMENT ARTHROPLASTY (CE)    Medications prior to admission:   Prior to Admission medications    Medication Sig Start Date End Date Taking? Authorizing Provider   atorvastatin (LIPITOR) 20 MG tablet 20 mg daily  20   Historical Provider, MD   metoprolol tartrate (LOPRESSOR) 25 MG tablet 25 mg 2 times daily  20   Historical Provider, MD   losartan (COZAAR) 50 MG tablet 50 mg daily  20   Historical Provider, MD   amLODIPine (NORVASC) 10 MG tablet 5 mg daily  20   Historical Provider, MD   levothyroxine (SYNTHROID) 100 MCG tablet 75 mcg Daily  20   Historical Provider, MD   metFORMIN (GLUCOPHAGE) 500 MG tablet 2 times daily (with meals)  20   Historical Provider, MD   alendronate (FOSAMAX) 70 MG tablet 70 mg every 7 days  20   Historical Provider, MD   SUMAtriptan (IMITREX) 50 MG tablet once as needed  6/10/20   Historical Provider, MD       Current medications:    Current Outpatient Medications   Medication Sig Dispense Refill    atorvastatin (LIPITOR) 20 MG tablet 20 mg daily       metoprolol tartrate (LOPRESSOR) 25 MG tablet 25 mg 2 times daily       losartan (COZAAR) 50 MG tablet 50 mg daily       amLODIPine (NORVASC) 10 MG tablet 5 mg daily       levothyroxine (SYNTHROID) 100 MCG tablet 75 mcg Daily       metFORMIN (GLUCOPHAGE) 500 MG tablet 2 times daily (with meals)       alendronate (FOSAMAX) 70 MG tablet 70 mg every 7 days       SUMAtriptan (IMITREX) 50 MG tablet once as needed        No current facility-administered medications for this visit. Allergies:     Allergies   Allergen Reactions    Penicillins     Sulfa Antibiotics        Problem List:  There is no problem list on file for this patient. Past Medical History:        Diagnosis Date    Arthritis     Diabetes mellitus ([address]     Hyperlipidemia     Hypertension     PONV (postoperative nausea and vomiting)     Spinal headache     Thyroid disease        Past Surgical History:        Procedure Laterality Date    BLADDER SUSPENSION  2016    COLONOSCOPY      FOOT SURGERY Bilateral     bunions, hammer toes      FOOT SURGERY Right 2021    CORRECTION ANGULAR DEFORMITY RIGHT 5TH DIGIT, PARTIAL EXCISION OF BONE RIGHT 5TH DIGIT (CPT 22462) performed by Austin Perry DPM at [address]  1999    JOINT REPLACEMENT Right 2018    done in [place]   knee       Social History:    Social History     Tobacco Use    Smoking status: Former Smoker     Quit date:      Years since quittin.7    Smokeless tobacco: Never Used   Substance Use Topics    Alcohol use: Never                                Counseling given: Not Answered      Vital Signs (Current): There were no vitals filed for this visit. BP Readings from Last 3 Encounters:   10/04/21 135/64   21 127/75   21 (!) 126/56       NPO Status:                                                                                 BMI:   Wt Readings from Last 3 Encounters:   10/04/21 167 lb (75.8 kg)   21 163 lb (73.9 kg)   21 170 lb (77.1 kg)     There is no height or weight on file to calculate BMI.    CBC:   Lab Results   Component Value Date    WBC 6.4 10/04/2021    RBC 4.34 10/04/2021    HGB 13.8 10/04/2021    HCT 41.6 10/04/2021    MCV 95.9 10/04/2021    RDW 12.4 10/04/2021     10/04/2021       CMP: No results found for: NA, K, CL, CO2, BUN, CREATININE, GFRAA, AGRATIO, LABGLOM, GLUCOSE, PROT, CALCIUM, BILITOT, ALKPHOS, AST, ALT    POC Tests: No results for input(s): POCGLU, POCNA, POCK, POCCL, POCBUN, POCHEMO, POCHCT in the last 72 hours.     Coags:   Lab Results

## 2021-10-04 NOTE — PROGRESS NOTES
Patient and significant other attended preoperative [facility] on 10/4/2021. Patient is scheduled to have an elective knee replacement. Patient was educated regarding Disease Process, Medications, Smoking Cessation, Oxygenation, Incentive Spirometry and Deep Breath and Cough, signs and symptoms of postoperative joint infection that include: Fever, Chills, Pain Control, Drainage and Redness, post-op follow up with orthopaedic surgeon, dressing removal, steri strips, ambulatory devices which include a standard walker and cane, bed mobility, correct anatomical alignment, active range of motion, proper transferring technique, incision care, infection prevention measures, non-pharmacologic comfort measures, notification of inadequate pain control measures, pain scale for assessing level of pain, pharmacologic pain management, relaxation techniques.

## 2021-10-04 NOTE — PROGRESS NOTES
CMP, CBC/diff, U/A, PT/PTT - faxed to Dr. Elda Norwood and Dr. Aga Doran offices - fax confirms received. Urine culture pending.

## 2021-10-05 LAB
EKG ATRIAL RATE: 65 BPM
EKG P AXIS: 16 DEGREES
EKG P-R INTERVAL: 168 MS
EKG Q-T INTERVAL: 420 MS
EKG QRS DURATION: 80 MS
EKG QTC CALCULATION (BAZETT): 436 MS
EKG R AXIS: 3 DEGREES
EKG T AXIS: 18 DEGREES
EKG VENTRICULAR RATE: 65 BPM
MRSA CULTURE ONLY: NORMAL

## 2021-10-06 LAB
ORGANISM: ABNORMAL
URINE CULTURE, ROUTINE: ABNORMAL

## 2021-10-12 ENCOUNTER — APPOINTMENT (OUTPATIENT)
Dept: GENERAL RADIOLOGY | Age: 71
End: 2021-10-12
Attending: ORTHOPAEDIC SURGERY
Payer: MEDICARE

## 2021-10-12 ENCOUNTER — HOSPITAL ENCOUNTER (OUTPATIENT)
Age: 71
Setting detail: OBSERVATION
Discharge: HOME | End: 2021-10-13
Attending: ORTHOPAEDIC SURGERY | Admitting: ORTHOPAEDIC SURGERY
Payer: MEDICARE

## 2021-10-12 ENCOUNTER — ANESTHESIA (OUTPATIENT)
Dept: OPERATING ROOM | Age: 71
End: 2021-10-12
Payer: MEDICARE

## 2021-10-12 VITALS
TEMPERATURE: 95.9 F | RESPIRATION RATE: 9 BRPM | DIASTOLIC BLOOD PRESSURE: 57 MMHG | OXYGEN SATURATION: 92 % | SYSTOLIC BLOOD PRESSURE: 100 MMHG

## 2021-10-12 DIAGNOSIS — M17.12 PRIMARY OSTEOARTHRITIS OF LEFT KNEE: Primary | ICD-10-CM

## 2021-10-12 LAB
METER GLUCOSE: 143 MG/DL (ref 74–99)
METER GLUCOSE: 211 MG/DL (ref 74–99)
METER GLUCOSE: 279 MG/DL (ref 74–99)

## 2021-10-12 PROCEDURE — 97165 OT EVAL LOW COMPLEX 30 MIN: CPT

## 2021-10-12 PROCEDURE — 6360000002 HC RX W HCPCS: Performed by: INTERNAL MEDICINE

## 2021-10-12 PROCEDURE — 73560 X-RAY EXAM OF KNEE 1 OR 2: CPT

## 2021-10-12 PROCEDURE — 6360000002 HC RX W HCPCS: Performed by: ORTHOPAEDIC SURGERY

## 2021-10-12 PROCEDURE — 7100000001 HC PACU RECOVERY - ADDTL 15 MIN: Performed by: ORTHOPAEDIC SURGERY

## 2021-10-12 PROCEDURE — 2500000003 HC RX 250 WO HCPCS

## 2021-10-12 PROCEDURE — 6370000000 HC RX 637 (ALT 250 FOR IP): Performed by: ORTHOPAEDIC SURGERY

## 2021-10-12 PROCEDURE — 6370000000 HC RX 637 (ALT 250 FOR IP): Performed by: INTERNAL MEDICINE

## 2021-10-12 PROCEDURE — 2709999900 HC NON-CHARGEABLE SUPPLY: Performed by: ORTHOPAEDIC SURGERY

## 2021-10-12 PROCEDURE — C1776 JOINT DEVICE (IMPLANTABLE): HCPCS | Performed by: ORTHOPAEDIC SURGERY

## 2021-10-12 PROCEDURE — 3600000015 HC SURGERY LEVEL 5 ADDTL 15MIN: Performed by: ORTHOPAEDIC SURGERY

## 2021-10-12 PROCEDURE — C1713 ANCHOR/SCREW BN/BN,TIS/BN: HCPCS | Performed by: ORTHOPAEDIC SURGERY

## 2021-10-12 PROCEDURE — 6360000002 HC RX W HCPCS

## 2021-10-12 PROCEDURE — 2580000003 HC RX 258: Performed by: ANESTHESIOLOGY

## 2021-10-12 PROCEDURE — 6370000000 HC RX 637 (ALT 250 FOR IP): Performed by: ANESTHESIOLOGY

## 2021-10-12 PROCEDURE — 64447 NJX AA&/STRD FEMORAL NRV IMG: CPT | Performed by: ANESTHESIOLOGY

## 2021-10-12 PROCEDURE — 88305 TISSUE EXAM BY PATHOLOGIST: CPT

## 2021-10-12 PROCEDURE — 97110 THERAPEUTIC EXERCISES: CPT | Performed by: PHYSICAL THERAPIST

## 2021-10-12 PROCEDURE — 88311 DECALCIFY TISSUE: CPT

## 2021-10-12 PROCEDURE — 82962 GLUCOSE BLOOD TEST: CPT

## 2021-10-12 PROCEDURE — 3700000001 HC ADD 15 MINUTES (ANESTHESIA): Performed by: ORTHOPAEDIC SURGERY

## 2021-10-12 PROCEDURE — 2580000003 HC RX 258: Performed by: ORTHOPAEDIC SURGERY

## 2021-10-12 PROCEDURE — 2500000003 HC RX 250 WO HCPCS: Performed by: ORTHOPAEDIC SURGERY

## 2021-10-12 PROCEDURE — 97161 PT EVAL LOW COMPLEX 20 MIN: CPT | Performed by: PHYSICAL THERAPIST

## 2021-10-12 PROCEDURE — 6360000002 HC RX W HCPCS: Performed by: ANESTHESIOLOGY

## 2021-10-12 PROCEDURE — 3700000000 HC ANESTHESIA ATTENDED CARE: Performed by: ORTHOPAEDIC SURGERY

## 2021-10-12 PROCEDURE — 27447 TOTAL KNEE ARTHROPLASTY: CPT | Performed by: ORTHOPAEDIC SURGERY

## 2021-10-12 PROCEDURE — 3600000005 HC SURGERY LEVEL 5 BASE: Performed by: ORTHOPAEDIC SURGERY

## 2021-10-12 PROCEDURE — 7100000000 HC PACU RECOVERY - FIRST 15 MIN: Performed by: ORTHOPAEDIC SURGERY

## 2021-10-12 DEVICE — CEMENT BNE 40 GM RADIOPAQUE BA SIMPLEX P: Type: IMPLANTABLE DEVICE | Site: KNEE | Status: FUNCTIONAL

## 2021-10-12 DEVICE — COMPONENT FEM SZ 3 L KNEE POST STBL CEM TRIATHLON: Type: IMPLANTABLE DEVICE | Site: KNEE | Status: FUNCTIONAL

## 2021-10-12 DEVICE — UPCHARGE KNEE X3 PATELLA STRYKER: Type: IMPLANTABLE DEVICE | Site: KNEE | Status: FUNCTIONAL

## 2021-10-12 DEVICE — COMPONENT PART KNEE CAPPED K1 STRYKER: Type: IMPLANTABLE DEVICE | Site: KNEE | Status: FUNCTIONAL

## 2021-10-12 DEVICE — UPCHARGE KNEE X3 LINER STRYKER: Type: IMPLANTABLE DEVICE | Site: KNEE | Status: FUNCTIONAL

## 2021-10-12 DEVICE — BASEPLATE TIB SZ 3 KNEE TRITANIUM CEM PRI LO PROF TRIATHLON: Type: IMPLANTABLE DEVICE | Site: KNEE | Status: FUNCTIONAL

## 2021-10-12 DEVICE — COMPONENT PAT DIA27MM THK8MM KNEE X3 SYMMETRIC TRIATHLON: Type: IMPLANTABLE DEVICE | Site: KNEE | Status: FUNCTIONAL

## 2021-10-12 DEVICE — IMPLANTABLE DEVICE: Type: IMPLANTABLE DEVICE | Site: KNEE | Status: FUNCTIONAL

## 2021-10-12 DEVICE — INSERT TIB BEAR SZ 3 THK9MM KNEE X3 POST STBL TRIATHLON: Type: IMPLANTABLE DEVICE | Site: KNEE | Status: FUNCTIONAL

## 2021-10-12 RX ORDER — SODIUM CHLORIDE 0.9 % (FLUSH) 0.9 %
5-40 SYRINGE (ML) INJECTION PRN
Status: DISCONTINUED | OUTPATIENT
Start: 2021-10-12 | End: 2021-10-12 | Stop reason: HOSPADM

## 2021-10-12 RX ORDER — FENTANYL CITRATE 50 UG/ML
100 INJECTION, SOLUTION INTRAMUSCULAR; INTRAVENOUS ONCE
Status: COMPLETED | OUTPATIENT
Start: 2021-10-12 | End: 2021-10-12

## 2021-10-12 RX ORDER — SENNA AND DOCUSATE SODIUM 50; 8.6 MG/1; MG/1
1 TABLET, FILM COATED ORAL 2 TIMES DAILY
Status: DISCONTINUED | OUTPATIENT
Start: 2021-10-12 | End: 2021-10-13 | Stop reason: HOSPADM

## 2021-10-12 RX ORDER — ROPIVACAINE HYDROCHLORIDE 5 MG/ML
30 INJECTION, SOLUTION EPIDURAL; INFILTRATION; PERINEURAL ONCE
Status: COMPLETED | OUTPATIENT
Start: 2021-10-12 | End: 2021-10-12

## 2021-10-12 RX ORDER — FENTANYL CITRATE 50 UG/ML
INJECTION, SOLUTION INTRAMUSCULAR; INTRAVENOUS
Status: COMPLETED
Start: 2021-10-12 | End: 2021-10-12

## 2021-10-12 RX ORDER — NICOTINE POLACRILEX 4 MG
15 LOZENGE BUCCAL PRN
Status: DISCONTINUED | OUTPATIENT
Start: 2021-10-12 | End: 2021-10-13 | Stop reason: HOSPADM

## 2021-10-12 RX ORDER — OXYCODONE HYDROCHLORIDE 5 MG/1
10 TABLET ORAL EVERY 4 HOURS PRN
Status: DISCONTINUED | OUTPATIENT
Start: 2021-10-12 | End: 2021-10-13 | Stop reason: HOSPADM

## 2021-10-12 RX ORDER — DEXTROSE MONOHYDRATE 50 MG/ML
100 INJECTION, SOLUTION INTRAVENOUS PRN
Status: DISCONTINUED | OUTPATIENT
Start: 2021-10-12 | End: 2021-10-13 | Stop reason: HOSPADM

## 2021-10-12 RX ORDER — MEPERIDINE HYDROCHLORIDE 25 MG/ML
12.5 INJECTION INTRAMUSCULAR; INTRAVENOUS; SUBCUTANEOUS EVERY 5 MIN PRN
Status: DISCONTINUED | OUTPATIENT
Start: 2021-10-12 | End: 2021-10-12 | Stop reason: HOSPADM

## 2021-10-12 RX ORDER — ONDANSETRON 2 MG/ML
INJECTION INTRAMUSCULAR; INTRAVENOUS PRN
Status: DISCONTINUED | OUTPATIENT
Start: 2021-10-12 | End: 2021-10-12 | Stop reason: SDUPTHER

## 2021-10-12 RX ORDER — OXYCODONE HYDROCHLORIDE 5 MG/1
5 TABLET ORAL EVERY 4 HOURS PRN
Status: DISCONTINUED | OUTPATIENT
Start: 2021-10-12 | End: 2021-10-13 | Stop reason: HOSPADM

## 2021-10-12 RX ORDER — PROMETHAZINE HYDROCHLORIDE 25 MG/ML
25 INJECTION, SOLUTION INTRAMUSCULAR; INTRAVENOUS EVERY 6 HOURS PRN
Status: DISCONTINUED | OUTPATIENT
Start: 2021-10-12 | End: 2021-10-13 | Stop reason: HOSPADM

## 2021-10-12 RX ORDER — FENTANYL CITRATE 50 UG/ML
INJECTION, SOLUTION INTRAMUSCULAR; INTRAVENOUS PRN
Status: DISCONTINUED | OUTPATIENT
Start: 2021-10-12 | End: 2021-10-12 | Stop reason: SDUPTHER

## 2021-10-12 RX ORDER — SCOLOPAMINE TRANSDERMAL SYSTEM 1 MG/1
1 PATCH, EXTENDED RELEASE TRANSDERMAL ONCE
Status: DISCONTINUED | OUTPATIENT
Start: 2021-10-12 | End: 2021-10-12

## 2021-10-12 RX ORDER — ACETAMINOPHEN 325 MG/1
650 TABLET ORAL EVERY 6 HOURS
Status: DISCONTINUED | OUTPATIENT
Start: 2021-10-12 | End: 2021-10-13 | Stop reason: HOSPADM

## 2021-10-12 RX ORDER — ACETAMINOPHEN 500 MG
1000 TABLET ORAL ONCE
Status: COMPLETED | OUTPATIENT
Start: 2021-10-12 | End: 2021-10-12

## 2021-10-12 RX ORDER — SODIUM CHLORIDE 9 MG/ML
INJECTION, SOLUTION INTRAVENOUS CONTINUOUS
Status: DISCONTINUED | OUTPATIENT
Start: 2021-10-12 | End: 2021-10-12

## 2021-10-12 RX ORDER — BUPIVACAINE HYDROCHLORIDE 7.5 MG/ML
INJECTION, SOLUTION INTRASPINAL PRN
Status: DISCONTINUED | OUTPATIENT
Start: 2021-10-12 | End: 2021-10-12 | Stop reason: SDUPTHER

## 2021-10-12 RX ORDER — HYDRALAZINE HYDROCHLORIDE 20 MG/ML
5 INJECTION INTRAMUSCULAR; INTRAVENOUS EVERY 10 MIN PRN
Status: DISCONTINUED | OUTPATIENT
Start: 2021-10-12 | End: 2021-10-12 | Stop reason: HOSPADM

## 2021-10-12 RX ORDER — SODIUM CHLORIDE 0.9 % (FLUSH) 0.9 %
5-40 SYRINGE (ML) INJECTION EVERY 12 HOURS SCHEDULED
Status: DISCONTINUED | OUTPATIENT
Start: 2021-10-12 | End: 2021-10-12 | Stop reason: HOSPADM

## 2021-10-12 RX ORDER — MIDAZOLAM HYDROCHLORIDE 1 MG/ML
INJECTION INTRAMUSCULAR; INTRAVENOUS
Status: COMPLETED
Start: 2021-10-12 | End: 2021-10-12

## 2021-10-12 RX ORDER — SODIUM CHLORIDE 450 MG/100ML
INJECTION, SOLUTION INTRAVENOUS CONTINUOUS
Status: DISCONTINUED | OUTPATIENT
Start: 2021-10-12 | End: 2021-10-13 | Stop reason: HOSPADM

## 2021-10-12 RX ORDER — SODIUM CHLORIDE 9 MG/ML
25 INJECTION, SOLUTION INTRAVENOUS PRN
Status: DISCONTINUED | OUTPATIENT
Start: 2021-10-12 | End: 2021-10-13 | Stop reason: HOSPADM

## 2021-10-12 RX ORDER — DEXAMETHASONE SODIUM PHOSPHATE 10 MG/ML
8 INJECTION INTRAMUSCULAR; INTRAVENOUS ONCE
Status: COMPLETED | OUTPATIENT
Start: 2021-10-12 | End: 2021-10-12

## 2021-10-12 RX ORDER — VANCOMYCIN HYDROCHLORIDE 1 G/20ML
INJECTION, POWDER, LYOPHILIZED, FOR SOLUTION INTRAVENOUS PRN
Status: DISCONTINUED | OUTPATIENT
Start: 2021-10-12 | End: 2021-10-12 | Stop reason: SDUPTHER

## 2021-10-12 RX ORDER — VANCOMYCIN HYDROCHLORIDE 1 G/20ML
INJECTION, POWDER, LYOPHILIZED, FOR SOLUTION INTRAVENOUS PRN
Status: DISCONTINUED | OUTPATIENT
Start: 2021-10-12 | End: 2021-10-12 | Stop reason: ALTCHOICE

## 2021-10-12 RX ORDER — ROPIVACAINE HYDROCHLORIDE 5 MG/ML
INJECTION, SOLUTION EPIDURAL; INFILTRATION; PERINEURAL
Status: COMPLETED
Start: 2021-10-12 | End: 2021-10-12

## 2021-10-12 RX ORDER — MORPHINE SULFATE 2 MG/ML
2 INJECTION, SOLUTION INTRAMUSCULAR; INTRAVENOUS
Status: DISCONTINUED | OUTPATIENT
Start: 2021-10-12 | End: 2021-10-13 | Stop reason: HOSPADM

## 2021-10-12 RX ORDER — SODIUM CHLORIDE 9 MG/ML
25 INJECTION, SOLUTION INTRAVENOUS PRN
Status: DISCONTINUED | OUTPATIENT
Start: 2021-10-12 | End: 2021-10-12 | Stop reason: HOSPADM

## 2021-10-12 RX ORDER — DEXTROSE MONOHYDRATE 25 G/50ML
12.5 INJECTION, SOLUTION INTRAVENOUS PRN
Status: DISCONTINUED | OUTPATIENT
Start: 2021-10-12 | End: 2021-10-13 | Stop reason: HOSPADM

## 2021-10-12 RX ORDER — CELECOXIB 100 MG/1
100 CAPSULE ORAL ONCE
Status: COMPLETED | OUTPATIENT
Start: 2021-10-12 | End: 2021-10-12

## 2021-10-12 RX ORDER — PROPOFOL 10 MG/ML
INJECTION, EMULSION INTRAVENOUS CONTINUOUS PRN
Status: DISCONTINUED | OUTPATIENT
Start: 2021-10-12 | End: 2021-10-12 | Stop reason: SDUPTHER

## 2021-10-12 RX ORDER — MIDAZOLAM HYDROCHLORIDE 1 MG/ML
1 INJECTION INTRAMUSCULAR; INTRAVENOUS EVERY 5 MIN PRN
Status: COMPLETED | OUTPATIENT
Start: 2021-10-12 | End: 2021-10-12

## 2021-10-12 RX ORDER — LOSARTAN POTASSIUM 50 MG/1
50 TABLET ORAL DAILY
Status: DISCONTINUED | OUTPATIENT
Start: 2021-10-12 | End: 2021-10-13 | Stop reason: HOSPADM

## 2021-10-12 RX ORDER — SODIUM CHLORIDE 0.9 % (FLUSH) 0.9 %
5-40 SYRINGE (ML) INJECTION EVERY 12 HOURS SCHEDULED
Status: DISCONTINUED | OUTPATIENT
Start: 2021-10-12 | End: 2021-10-13 | Stop reason: HOSPADM

## 2021-10-12 RX ORDER — PROMETHAZINE HYDROCHLORIDE 25 MG/ML
6.25 INJECTION, SOLUTION INTRAMUSCULAR; INTRAVENOUS
Status: DISCONTINUED | OUTPATIENT
Start: 2021-10-12 | End: 2021-10-12 | Stop reason: HOSPADM

## 2021-10-12 RX ORDER — LEVOTHYROXINE SODIUM 0.07 MG/1
75 TABLET ORAL DAILY
Status: DISCONTINUED | OUTPATIENT
Start: 2021-10-13 | End: 2021-10-13 | Stop reason: HOSPADM

## 2021-10-12 RX ORDER — AMLODIPINE BESYLATE 5 MG/1
5 TABLET ORAL DAILY
Status: DISCONTINUED | OUTPATIENT
Start: 2021-10-13 | End: 2021-10-13 | Stop reason: HOSPADM

## 2021-10-12 RX ORDER — MORPHINE SULFATE 4 MG/ML
4 INJECTION, SOLUTION INTRAMUSCULAR; INTRAVENOUS EVERY 4 HOURS PRN
Status: DISCONTINUED | OUTPATIENT
Start: 2021-10-12 | End: 2021-10-13 | Stop reason: HOSPADM

## 2021-10-12 RX ORDER — LABETALOL HYDROCHLORIDE 5 MG/ML
5 INJECTION, SOLUTION INTRAVENOUS EVERY 10 MIN PRN
Status: DISCONTINUED | OUTPATIENT
Start: 2021-10-12 | End: 2021-10-12 | Stop reason: HOSPADM

## 2021-10-12 RX ORDER — GABAPENTIN 300 MG/1
300 CAPSULE ORAL ONCE
Status: COMPLETED | OUTPATIENT
Start: 2021-10-12 | End: 2021-10-12

## 2021-10-12 RX ORDER — ATORVASTATIN CALCIUM 20 MG/1
20 TABLET, FILM COATED ORAL DAILY
Status: DISCONTINUED | OUTPATIENT
Start: 2021-10-12 | End: 2021-10-13 | Stop reason: HOSPADM

## 2021-10-12 RX ORDER — FENTANYL CITRATE 50 UG/ML
25 INJECTION, SOLUTION INTRAMUSCULAR; INTRAVENOUS PRN
Status: DISCONTINUED | OUTPATIENT
Start: 2021-10-12 | End: 2021-10-12 | Stop reason: HOSPADM

## 2021-10-12 RX ORDER — SODIUM CHLORIDE 0.9 % (FLUSH) 0.9 %
5-40 SYRINGE (ML) INJECTION PRN
Status: DISCONTINUED | OUTPATIENT
Start: 2021-10-12 | End: 2021-10-13 | Stop reason: HOSPADM

## 2021-10-12 RX ORDER — BUPIVACAINE HYDROCHLORIDE 2.5 MG/ML
INJECTION, SOLUTION EPIDURAL; INFILTRATION; INTRACAUDAL PRN
Status: DISCONTINUED | OUTPATIENT
Start: 2021-10-12 | End: 2021-10-12 | Stop reason: ALTCHOICE

## 2021-10-12 RX ORDER — ONDANSETRON 2 MG/ML
4 INJECTION INTRAMUSCULAR; INTRAVENOUS EVERY 6 HOURS PRN
Status: DISCONTINUED | OUTPATIENT
Start: 2021-10-12 | End: 2021-10-13 | Stop reason: HOSPADM

## 2021-10-12 RX ADMIN — DEXAMETHASONE SODIUM PHOSPHATE 8 MG: 10 INJECTION INTRAMUSCULAR; INTRAVENOUS at 06:00

## 2021-10-12 RX ADMIN — MIDAZOLAM 1 MG: 1 INJECTION INTRAMUSCULAR; INTRAVENOUS at 07:21

## 2021-10-12 RX ADMIN — FENTANYL CITRATE 100 MCG: 50 INJECTION, SOLUTION INTRAMUSCULAR; INTRAVENOUS at 07:20

## 2021-10-12 RX ADMIN — SENNOSIDES AND DOCUSATE SODIUM 1 TABLET: 50; 8.6 TABLET ORAL at 13:32

## 2021-10-12 RX ADMIN — VANCOMYCIN HYDROCHLORIDE 1000 MG: 1 INJECTION, POWDER, LYOPHILIZED, FOR SOLUTION INTRAVENOUS at 21:10

## 2021-10-12 RX ADMIN — OXYCODONE 5 MG: 5 TABLET ORAL at 17:00

## 2021-10-12 RX ADMIN — FENTANYL CITRATE 100 MCG: 0.05 INJECTION, SOLUTION INTRAMUSCULAR; INTRAVENOUS at 07:20

## 2021-10-12 RX ADMIN — PROPOFOL INJECTABLE EMULSION 50 MCG/KG/MIN: 10 INJECTION, EMULSION INTRAVENOUS at 07:42

## 2021-10-12 RX ADMIN — PHENYLEPHRINE HYDROCHLORIDE 100 MCG: 10 INJECTION INTRAVENOUS at 09:05

## 2021-10-12 RX ADMIN — SENNOSIDES AND DOCUSATE SODIUM 1 TABLET: 50; 8.6 TABLET ORAL at 21:10

## 2021-10-12 RX ADMIN — PROMETHAZINE HYDROCHLORIDE 25 MG: 25 INJECTION INTRAMUSCULAR; INTRAVENOUS at 14:50

## 2021-10-12 RX ADMIN — METOPROLOL TARTRATE 25 MG: 25 TABLET, FILM COATED ORAL at 21:10

## 2021-10-12 RX ADMIN — ASPIRIN 325 MG: 325 TABLET, COATED ORAL at 13:31

## 2021-10-12 RX ADMIN — SODIUM CHLORIDE: 4.5 INJECTION, SOLUTION INTRAVENOUS at 12:26

## 2021-10-12 RX ADMIN — FENTANYL CITRATE 25 MCG: 50 INJECTION, SOLUTION INTRAMUSCULAR; INTRAVENOUS at 07:41

## 2021-10-12 RX ADMIN — ACETAMINOPHEN 650 MG: 325 TABLET ORAL at 13:32

## 2021-10-12 RX ADMIN — PHENYLEPHRINE HYDROCHLORIDE 100 MCG: 10 INJECTION INTRAVENOUS at 08:39

## 2021-10-12 RX ADMIN — FENTANYL CITRATE 25 MCG: 50 INJECTION, SOLUTION INTRAMUSCULAR; INTRAVENOUS at 10:22

## 2021-10-12 RX ADMIN — PHENYLEPHRINE HYDROCHLORIDE 100 MCG: 10 INJECTION INTRAVENOUS at 07:58

## 2021-10-12 RX ADMIN — PHENYLEPHRINE HYDROCHLORIDE 100 MCG: 10 INJECTION INTRAVENOUS at 08:43

## 2021-10-12 RX ADMIN — INSULIN LISPRO 2 UNITS: 100 INJECTION, SOLUTION INTRAVENOUS; SUBCUTANEOUS at 21:10

## 2021-10-12 RX ADMIN — MIDAZOLAM 1 MG: 1 INJECTION INTRAMUSCULAR; INTRAVENOUS at 07:20

## 2021-10-12 RX ADMIN — LOSARTAN POTASSIUM 50 MG: 50 TABLET, FILM COATED ORAL at 13:32

## 2021-10-12 RX ADMIN — VANCOMYCIN HYDROCHLORIDE 1000 MG: 1 INJECTION, POWDER, LYOPHILIZED, FOR SOLUTION INTRAVENOUS at 07:46

## 2021-10-12 RX ADMIN — BUPIVACAINE HYDROCHLORIDE IN DEXTROSE 2 ML: 7.5 INJECTION, SOLUTION SUBARACHNOID at 07:41

## 2021-10-12 RX ADMIN — OXYCODONE 10 MG: 5 TABLET ORAL at 22:07

## 2021-10-12 RX ADMIN — ACETAMINOPHEN 1000 MG: 500 TABLET ORAL at 06:02

## 2021-10-12 RX ADMIN — METFORMIN HYDROCHLORIDE 500 MG: 500 TABLET ORAL at 16:45

## 2021-10-12 RX ADMIN — INSULIN LISPRO 6 UNITS: 100 INJECTION, SOLUTION INTRAVENOUS; SUBCUTANEOUS at 16:49

## 2021-10-12 RX ADMIN — SODIUM CHLORIDE: 9 INJECTION, SOLUTION INTRAVENOUS at 08:22

## 2021-10-12 RX ADMIN — PHENYLEPHRINE HYDROCHLORIDE 100 MCG: 10 INJECTION INTRAVENOUS at 08:49

## 2021-10-12 RX ADMIN — PHENYLEPHRINE HYDROCHLORIDE 100 MCG: 10 INJECTION INTRAVENOUS at 08:24

## 2021-10-12 RX ADMIN — CELECOXIB 100 MG: 100 CAPSULE ORAL at 06:02

## 2021-10-12 RX ADMIN — VANCOMYCIN HYDROCHLORIDE 1000 MG: 1 INJECTION, POWDER, LYOPHILIZED, FOR SOLUTION INTRAVENOUS at 07:41

## 2021-10-12 RX ADMIN — ONDANSETRON 4 MG: 2 INJECTION INTRAMUSCULAR; INTRAVENOUS at 13:29

## 2021-10-12 RX ADMIN — SODIUM CHLORIDE: 9 INJECTION, SOLUTION INTRAVENOUS at 09:27

## 2021-10-12 RX ADMIN — METOPROLOL TARTRATE 25 MG: 25 TABLET, FILM COATED ORAL at 13:33

## 2021-10-12 RX ADMIN — SODIUM CHLORIDE: 9 INJECTION, SOLUTION INTRAVENOUS at 05:59

## 2021-10-12 RX ADMIN — ONDANSETRON 4 MG: 2 INJECTION INTRAMUSCULAR; INTRAVENOUS at 10:23

## 2021-10-12 RX ADMIN — ROPIVACAINE HYDROCHLORIDE 20 ML: 5 INJECTION, SOLUTION EPIDURAL; INFILTRATION; PERINEURAL at 07:28

## 2021-10-12 RX ADMIN — PHENYLEPHRINE HYDROCHLORIDE 100 MCG: 10 INJECTION INTRAVENOUS at 08:15

## 2021-10-12 RX ADMIN — GABAPENTIN 300 MG: 300 CAPSULE ORAL at 06:01

## 2021-10-12 RX ADMIN — ATORVASTATIN CALCIUM 20 MG: 20 TABLET, FILM COATED ORAL at 13:32

## 2021-10-12 ASSESSMENT — PULMONARY FUNCTION TESTS
PIF_VALUE: 1
PIF_VALUE: 0
PIF_VALUE: 1
PIF_VALUE: 0
PIF_VALUE: 1
PIF_VALUE: 0
PIF_VALUE: 1
PIF_VALUE: 0
PIF_VALUE: 1

## 2021-10-12 ASSESSMENT — PAIN SCALES - GENERAL
PAINLEVEL_OUTOF10: 4
PAINLEVEL_OUTOF10: 0
PAINLEVEL_OUTOF10: 0
PAINLEVEL_OUTOF10: 7
PAINLEVEL_OUTOF10: 5
PAINLEVEL_OUTOF10: 6
PAINLEVEL_OUTOF10: 6

## 2021-10-12 ASSESSMENT — PAIN - FUNCTIONAL ASSESSMENT: PAIN_FUNCTIONAL_ASSESSMENT: 0-10

## 2021-10-12 ASSESSMENT — LIFESTYLE VARIABLES: SMOKING_STATUS: 0

## 2021-10-12 NOTE — CONSULTS
Department of Internal Medicine        HISTORY OF PRESENT ILLNESS:      The patient is a 66 y.o. female who presents with having elective left TKA performed secondary to chronic left knee discomfort. Patient is seen postop. Patient has expected postop discomfort. He denies any problem chest pain, abdominal pain, nausea/vomiting, dizziness or unusual shortness of breath. Past Medical History:    Past Medical History:   Diagnosis Date    Arthritis     Diabetes mellitus ([address]     Hyperlipidemia     Hypertension     PONV (postoperative nausea and vomiting)     Spinal headache     Thyroid disease      Past Surgical History:    Past Surgical History:   Procedure Laterality Date    BLADDER SUSPENSION  2016    COLONOSCOPY      FOOT SURGERY Bilateral     bunions, hammer toes      FOOT SURGERY Right 8/17/2021    CORRECTION ANGULAR DEFORMITY RIGHT 5TH DIGIT, PARTIAL EXCISION OF BONE RIGHT 5TH DIGIT (CPT 94963) performed by Richie Bui DPM at [address]  02/1999    JOINT REPLACEMENT Right 05/2018    done in [place]   knee       Medications Prior to Admission:    @  Prior to Admission medications    Medication Sig Start Date End Date Taking?  Authorizing Provider   metoprolol tartrate (LOPRESSOR) 25 MG tablet 25 mg 2 times daily  5/28/20  Yes Historical Provider, MD   amLODIPine (NORVASC) 10 MG tablet 5 mg daily  6/20/20  Yes Historical Provider, MD   levothyroxine (SYNTHROID) 100 MCG tablet 75 mcg Daily  6/4/20  Yes Historical Provider, MD   atorvastatin (LIPITOR) 20 MG tablet 20 mg daily  11/23/20   Historical Provider, MD   losartan (COZAAR) 50 MG tablet 50 mg daily  5/28/20   Historical Provider, MD   metFORMIN (GLUCOPHAGE) 500 MG tablet 2 times daily (with meals)  6/13/20   Historical Provider, MD   alendronate (FOSAMAX) 70 MG tablet 70 mg every 7 days  6/1/20   Historical Provider, MD   SUMAtriptan (IMITREX) 50 MG tablet once as needed  6/10/20   Historical Provider, MD Allergies:  Penicillins and Sulfa antibiotics    Social History:   Social History     Socioeconomic History    Marital status:      Spouse name: Not on file    Number of children: Not on file    Years of education: Not on file    Highest education level: Not on file   Occupational History    Not on file   Tobacco Use    Smoking status: Former Smoker     Quit date:      Years since quittin.8    Smokeless tobacco: Never Used   Substance and Sexual Activity    Alcohol use: Never    Drug use: Never    Sexual activity: Not on file   Other Topics Concern    Not on file   Social History Narrative    Not on file     Social Determinants of Health     Financial Resource Strain:     Difficulty of Paying Living Expenses:    Food Insecurity:     Worried About [address] in the Last Year:     [address] in the Last Year:    Transportation Needs:     Lack of Transportation (Medical):  Lack of Transportation (Non-Medical):    Physical Activity:     Days of Exercise per Week:     Minutes of Exercise per Session:    Stress:     Feeling of Stress :    Social Connections:     Frequency of Communication with Friends and Family:     Frequency of Social Gatherings with Friends and Family:     Attends Anabaptism Services:     Active Member of Clubs or Organizations:     Attends Club or Organization Meetings:     Marital Status:    Intimate Partner Violence:     Fear of Current or Ex-Partner:     Emotionally Abused:     Physically Abused:     Sexually Abused:        Family History:   History reviewed. No pertinent family history. REVIEW OF SYSTEMS:    Gen: Patient denies any lightheadedness or dizziness. No LOC or syncope. No fevers or chills. HEENT: No earache, sore throat or nasal congestion. Resp: Denies cough, hemoptysis or sputum production. Cardiac: Denies chest pain, SOB, diaphoresis or palpitations. GI: No nausea, vomiting, diarrhea or constipation. No melena or hematochezia. : No urinary complaints, dysuria, hematuria or frequency. MSK: + Left knee pain. No extremity weakness, paralysis or paresthesias. PHYSICAL EXAM:    Vitals:  /73   Pulse 66   Temp 97.6 °F (36.4 °C) (Oral)   Resp 16   Ht 5' 6\" (1.676 m)   Wt 170 lb (77.1 kg)   SpO2 96%   BMI 27.44 kg/m²     General:  This is a 66 y.o. yo female who is alert and oriented in moderate postop discomfort. HEENT:  Head is normocephalic and atraumatic, PERRLA, EOMI, mucus membranes moist with no pharyngeal erythema or exudate. Neck:  Supple with no carotid bruits, JVD or thyromegaly.   No cervical adenopathy  CV:  Regular rate and rhythm, no murmurs  Lungs:  Clear to auscultation bilaterally with no wheezes, rales or rhonchi  Abdomen:  Soft, nontender, nondistended, bowel sounds present  Extremities:  + Left knee edema postop, peripheral pulses intact bilaterally  Neuro:  Cranial nerves II-XII grossly intact; motor and sensory function intact with no focal deficits  Skin:  No rashes, lesions or wounds      DATA:  CBC with Differential:    Lab Results   Component Value Date    WBC 6.4 10/04/2021    RBC 4.34 10/04/2021    HGB 13.8 10/04/2021    HCT 41.6 10/04/2021     10/04/2021    MCV 95.9 10/04/2021    MCH 31.8 10/04/2021    MCHC 33.2 10/04/2021    RDW 12.4 10/04/2021    LYMPHOPCT 15.3 10/04/2021    MONOPCT 9.1 10/04/2021    BASOPCT 0.9 10/04/2021    MONOSABS 0.58 10/04/2021    LYMPHSABS 0.97 10/04/2021    EOSABS 0.38 10/04/2021    BASOSABS 0.06 10/04/2021     CMP:    Lab Results   Component Value Date     10/04/2021    K 4.0 10/04/2021     10/04/2021    CO2 27 10/04/2021    BUN 16 10/04/2021    CREATININE 0.8 10/04/2021    GFRAA >60 10/04/2021    LABGLOM >60 10/04/2021    GLUCOSE 199 10/04/2021    PROT 7.6 10/04/2021    LABALBU 4.0 10/04/2021    CALCIUM 9.3 10/04/2021    BILITOT 0.4 10/04/2021    ALKPHOS 170 10/04/2021    AST 27 10/04/2021    ALT 27 10/04/2021 Magnesium:  No results found for: MG  Phosphorus:  No results found for: PHOS  PT/INR:    Lab Results   Component Value Date    PROTIME 10.8 10/04/2021    INR 0.9 10/04/2021     Troponin:  No results found for: TROPONINI  U/A:    Lab Results   Component Value Date    COLORU Yellow 10/04/2021    PROTEINU Negative 10/04/2021    PHUR 6.0 10/04/2021    WBCUA 10-20 10/04/2021    RBCUA NONE 10/04/2021    BACTERIA MANY 10/04/2021    CLARITYU Clear 10/04/2021    SPECGRAV 1.010 10/04/2021    LEUKOCYTESUR SMALL 10/04/2021    UROBILINOGEN 0.2 10/04/2021    BILIRUBINUR Negative 10/04/2021    BLOODU Negative 10/04/2021    GLUCOSEU Negative 10/04/2021     ABG:  No results found for: PH, PCO2, PO2, HCO3, BE, THGB, TCO2, O2SAT  HgBA1c:  No results found for: LABA1C  FLP:  No results found for: TRIG, HDL, LDLCALC, LDLDIRECT, LABVLDL  TSH:  No results found for: TSH  IRON:  No results found for: IRON  LIPASE:  No results found for: LIPASE    ASSESSMENT AND PLAN:      Patient Active Problem List    Diagnosis Date Noted    Arthritis of left knee-status post left TKA 10/12 10/12/2021    Hypertension     Hyperlipidemia     Thyroid disease      Noninsulin-dependent diabetes mellitus ([facility] [address]      Plan:  Patient admitted to medical surgical floor  Home medications reviewed  Monitor heart rate, blood pressure, O2 saturations  Glucoscans 4 times daily with sliding scale insulin  Routine labs in a.m.     Ellan Kanner, DO, D.OBoby  10/12/2021  2:20 PM

## 2021-10-12 NOTE — ANESTHESIA PROCEDURE NOTES
Spinal Block    Patient location during procedure: OR  Start time: 10/12/2021 7:40 AM  End time: 10/12/2021 7:45 AM  Reason for block: post-op pain management and primary anesthetic  Staffing  Performed: other anesthesia staff   Anesthesiologist: Angela Barrera MD  Resident/CRNA: Mary Russo, [address]  Other anesthesia staff: Rakesh Bae RN  Preanesthetic Checklist  Completed: patient identified, IV checked, site marked, risks and benefits discussed, surgical consent, monitors and equipment checked, pre-op evaluation, timeout performed, anesthesia consent given, oxygen available and patient being monitored  Spinal Block  Patient position: sitting  Prep: ChloraPrep and site prepped and draped  Patient monitoring: cardiac monitor, continuous pulse ox and frequent blood pressure checks  Approach: midline  Location: L3/L4  Guidance: paresthesia technique  Provider prep: sterile gloves and mask  Local infiltration: lidocaine  Dose: 0.5  Agent: bupivacaine  Adjuvant: fentanyl  Dose: 2  Dose: 2  Needle  Needle type: Pencan   Needle gauge: 25 G  Needle length: 3.5 in  Assessment  Sensory level: T4  Swirl obtained: Yes  CSF: clear  Attempts: 1  Hemodynamics: stable

## 2021-10-12 NOTE — PROGRESS NOTES
Physical Therapy Initial Evaluation/Plan of Care    Room #:  0317/0317-01  Patient Name: Rosemary Lester  YOB: 1950  MRN: [id number]    Date of Service: 10/12/2021     Tentative placement recommendation: [address] [facility] [facility] 5 days a week   Equipment recommendation: Patient has needed equipment       Evaluating Physical Therapist: Eliana Davis #[id number]     Specific Provider Orders/Date/Referring Provider :  10/12/21 1045   PT eval and treat Start: 10/12/21 1045, End: 10/12/21 1045, ONE TIME, Standing Count: 1 Occurrences, R    DONYA Bingham DO      Admitting Diagnosis:   Primary osteoarthritis of left knee [M17.12]  Arthritis of left knee [M17.12]   Visit diagnosis:   Visit Diagnoses       Codes    Primary osteoarthritis of left knee    -  Primary M17.12        Surgery:   DATE OF PROCEDURE: 10/12/2021   SURGEON: Choco Santacruz    OPERATION: Left total knee replacement arthroplasty    Patient Active Problem List   Diagnosis    Arthritis of left knee    Hypertension    Hyperlipidemia    Thyroid disease    Diabetes mellitus ([facility] [address]       ASSESSMENT of current deficits: Patient exhibits decreased strength, balance, endurance, range of motion and pain left knee impairing functional mobility, transfers, gait , gait distance and tolerance to activity nausea/emesis, left knee buckling instance and gait, limiting increased functional mobility and safety. Patient requires skilled physical therapy to address concerns listed above to increase safety and independence at discharge.          PHYSICAL THERAPY  PLAN OF CARE       Physical therapy plan of care is established based on physician order,  patient diagnosis and clinical assessment    Current Treatment Recommendations:    -Bed Mobility: Lower extremity exercises   -Sitting Balance: Incorporate reaching activities to activate trunk muscles , Facilitate active trunk muscle engagement  and Facilitate postural control in all planes   -Standing Balance: Perform strengthening exercises in standing to promote motor control with or without upper extremity support   -Transfers: Provide instruction on proper hand and foot position for adequate transfer of weight onto lower extremities and use of gait device if needed and Cues for hand placement, technique and safety. Provide stabilization to prevent fall   -Gait: Gait training, Standing activities to improve: base of support, weight shift, weight bearing  and Pregait training to emphasize: Sequencing , Weight shift, Device control, left knee extension in stance phase of gait and Safety   -Endurance: Utilize Supervised activities to increase level of endurance to allow for safe functional mobility including transfers and gait   -Stairs: Stair training with instruction on proper technique and hand placement on rail    PT long term treatment goals are located in below grid    Patient and or family understand(s) diagnosis, prognosis, and plan of care. Frequency of treatments: Patient will be seen  twice daily  for therapeutic exercise, functional retraining, endurance activities, balance exercises, family and patient education.        Prior Level of Function: Patient ambulated independently    Rehab Potential: good  - for baseline    Past medical history:   Past Medical History:   Diagnosis Date    Arthritis     Diabetes mellitus ([facility] [address]     Hyperlipidemia     Hypertension     PONV (postoperative nausea and vomiting)     Spinal headache     Thyroid disease      Past Surgical History:   Procedure Laterality Date    BLADDER SUSPENSION  2016    COLONOSCOPY      FOOT SURGERY Bilateral     bunions, hammer toes      FOOT SURGERY Right 8/17/2021    CORRECTION ANGULAR DEFORMITY RIGHT 5TH DIGIT, PARTIAL EXCISION OF BONE RIGHT 5TH DIGIT (CPT 80611) performed by Heath Dixon DPM at [address]  02/1999    JOINT REPLACEMENT Right 05/2018    done in [place]   knee         SUBJECTIVE:    Precautions:   , falls ,left lower extremity FWB (full weight bearing) urinary incontinence    Social history: Patient lives with significant other, Leila Roberts in a ranch home  with 3-4 steps  to enter bilateral [employer] in shower   built in [facility] owned: [employer] [place] and Bedside commode,       [address] [facility]   How much difficulty turning over in bed?: A Little  How much difficulty sitting down on / standing up from a chair with arms?: A Lot  How much difficulty moving from lying on back to sitting on side of bed?: A Little  How much help from another person moving to and from a bed to a chair?: A Lot  How much help from another person needed to walk in hospital room?: Total  How much help from another person for climbing 3-5 steps with a railing?: Total  AM-PAC Inpatient Mobility Raw Score : 12  AM-PAC Inpatient T-Scale Score : 35.33  Mobility Inpatient CMS 0-100% Score: 68.66  Mobility Inpatient CMS G-Code Modifier : CL    Nursing cleared patient for PT evaluation. The admitting diagnosis and active problem list as listed above have been reviewed prior to the initiation of this evaluation. OBJECTIVE:   Initial Evaluation  Date: 10/12/2021 Treatment Date: Short Term/ Long Term   Goals   Was pt agreeable to Eval/treatment? Yes     Pain Level  5/10   Left knee        Bed Mobility  Rolling: Not assessed     Supine to sit: Not assessed     Sit to supine: Minimal assist of 1    Scooting: Minimal assist of 1    Rolling: Independent    Supine to sit: Independent    Sit to supine: Independent    Scooting: Independent     Transfers Sit to stand:  Moderate assist of  2 left knee buckling requiring knee block assist of 2 for safety  Sit to stand: Modified Independent     Ambulation    2 x 3 feet using  hand held assist with Maximal assist of  2   Patient with flexed posture and left knee buckling and cues for sequencing, upright posture, left knee extension in stance phase of gait and safety  100 feet using  wheeled walker with Modified Independent    Stair negotiation: ascended and descended   Not assessed       4 steps 1 rail with supervision    ROM Within functional limits except Left knee 5-80°     Increase range of motion 10% of affected joints    Strength Within functional limits  except  Left lower extremity 3-/5  Within functional limits   Balance Sitting EOB:  fair  +  Dynamic Standing:  poor left knee buckling requiring blocking wheeled walker   Sitting EOB:  good    Dynamic Standing:  good wheeled walker      Patient is Alert & Oriented x person, place, time and situation and follows directions    Sensation:  Patient reports numbness/tingling left lower extremity    Edema:  yes left lower extremity   Vitals:  Blood Pressure at rest   Blood Pressure during session     Heart Rate at rest 63 Heart Rate during session     SPO2 at rest 98%  SPO2 during session  %     Patient education  Patient educated on role of Physical Therapy, risks of immobility, safety and plan of care,  importance of mobility while in hospital , ankle pumps, quad set and glut set for edema control, blood clot prevention, weight bearing status  and left knee extension in bed and during stance phase of gait      Patient response to education:   Pt verbalized understanding Pt demonstrated skill Pt requires further education in this area   Yes Partial Yes       Treatment:  Patient practiced and was instructed in the following treatment:     Therapist educated and facilitated patient on techniques to increase safety and independence with bed mobility, balance, functional transfers, and functional mobility. Instruction knee extension in bed with kneecap and toes pointing to ceiling  Instruction and performance of incentive inspirometer. Patient performed ankle pumps, quad sets, glut sets x  20 each.  Active assist heelslide, hip abduction/adduction, straight leg raise x 15 each     Assisted to/from bedside commode, doffed/donned depends with OT. Sat edge of bed 15 minutes with Supervision  to increase dynamic sitting balance and activity tolerance. emesis, nursing and doctor notified continued nausea after medicated earlier. Returned to bed. At end of session, patient in bed with alarm call light and phone within reach,   all lines and tubes intact, nursing notified. Patient would benefit from continued skilled Physical Therapy to improve functional independence and quality of life. Patient's/ family goals   home      Patient and or family understand(s) diagnosis, prognosis, and plan of care. Frequency of treatments: Patient will be seen  twice daily  for therapeutic exercise, functional retraining, endurance activities, balance exercises, family and patient education. Time in  1302       1408  Time out  1312       1438    Total Treatment Time  10 minutes    Evaluation time includes thorough review of current medical information, gathering information on past medical history/social history and prior level of function, completion of standardized testing/informal observation of tasks, assessment of data, and development of Plan of care and goals.      CPT codes:  Low Complexity PT evaluation (44922)  Therapeutic exercises (58538)   10 minutes  1 unit(s)    Chuckie Malin PT

## 2021-10-12 NOTE — FLOWSHEET NOTE
10/12/21 0916   Social/Functional History   Lives With Significant other   Type of [address] One level   Home Access Stairs to enter with rails   Entrance Stairs - Number of Steps 3-4 steps to enter   Entrance Stairs - Rails Both   [employer] Shower/Tub Walk-in shower   Bathroom Toilet Handicap height   Bathroom Equipment Built-in shower seat   Home Equipment Rolling walker   Receives Help From Other (comment)   10/12/2021: SS Note/Discharge planning:  Met with pt in [facility], pt having surgery for a total knee arthroplasty today 10/12 , explained sw role for transition of care and reviewed rehab options and providers for Medicare, pt relays no past [facility] locally, pt plans to return home with help from her live in [place], who will transport her home, [facility] choices/list for [facility] reviewed with pt, no agency preference relayed, referral made to Lluvia Garcia for [employer] who will follow for home care orders, pt has DME needed, sw/cm to follow post-op. Electronically signed by MAGED Mejia on 10/12/2021 at 9:14 AM

## 2021-10-12 NOTE — PROGRESS NOTES
710 to pacu for left adductor canal block. Connected to all monitors and O2 applied at 3 liters. 719 time out performed and premedicated per orders  724 block started by dr Vanessa Tristan utiizing ultrasound  728 20cc 0.5% ropivacaine injected to left adductor canal- tolerated procedure well.  Family updated

## 2021-10-12 NOTE — OP NOTE
Operative report        DATE OF PROCEDURE: 10/12/2021     SURGEON: Deysi Vee    ASSISTANT: Debi Mendez    PREOPERATIVE DIAGNOSIS: Degenerative arthrosis left knee    POSTOPERATIVE DIAGNOSIS: Same    OPERATION: Left total knee replacement arthroplasty    ANESTHESIA: Spinal    ESTIMATED BLOOD LOSS: Less than 50 cc    COMPLICATIONS: None    SPECIMENS: Was sent to pathology    HISTORY: The patient is a 66y.o. year old female with history of above preop diagnosis. I explained the risk, benefits, expected outcome, and alternatives to the procedure. Patient understands and is in agreement. PROCEDURE: Patient is brought the operating room after site side were identified. An abductor canal block of been done in the preop area by anesthesia. Patient was brought the operating room and adequate spinal anesthetic was administered by anesthesia the patient was placed supine on the operating table. Tourniquet was placed high on the left upper left upper thigh. The leg was then prepped with draped in a sterile fashion. A straight anterior midline incision was marked on the knee then Damien Pilar was placed over this because of some skin lesions that she had. There were no openings and no acute inflammatory changes. The leg was then exsanguinated with elastic wrap with tourniquet pressure set at 300 mmHg pressure. The incision was made and full-thickness medial lateral flaps were made. The the medial parapatellar capsulotomy was performed. Portion of the fat pad was removed and also with synovium above the trochlea was hyperemic and hypertrophic. The patella was then measured for thickness and cut for appropriate thickness replacement and protective metal disc was applied to this. Osteophytes were surrounding patella and these were removed with rongeur to clean this up. The anterior horns of the menisci were removed.   The knee was flexed and the IM drill was used to provide for the IM delbert to stabilize the distal cutting block at 4 degrees of valgus and 8 mm resection. The distal bone cut was made. The femur was then sized at 3 for best fit without overhang or notching. The size three 4-in-1 cutting block was then applied and the anterior and posterior cuts and chamfer cuts were made. The cruciate ligaments were cleaned up and the meniscal remnants were removed at this time as well. The tibia was then retracted forward. The tibia was set up with an IM drill hole for stabilization of the proximal cutting block roughly perpendicular to the long axis. The lateral condyle was referenced with the stylus and the cutting block set up. Before cutting the spacer block was used and it was apparent that the gaps were very tight and the cutting block was moved ultimately 4 mm distal and the bone cut was made. This gave us good flexion gap with cyst still a little tightness in extension. The tensiometer showed good medial lateral alignment. Ultimately the distal cutting block was replaced on the femur and 2 more millimeters of bone was taken and the chamfers were recut. The tensiometer then showed good balance. The tibial baseplate trial was placed size 3 and appropriate alignment and position. This was pinned in the 9 mm bearing was applied. The femoral component trial component was placed and the knee taken through range of motion. Alignment was good and patellofemoral tracking was good as well. The tibia was then set up for the fin baseplate. The femur was then cut for the posterior stabilized femoral component. The patella was prepared with a 27 mm symmetric jig. The trials were then all removed and the knee thoroughly irrigated. A bone plug was placed in the distal femoral canal.  Local anesthetic was injected surrounding soft tissues for postop pain control. The cement was mixed on my viscus and components were cemented. The tibial baseplate went in nicely.   The tibial bearing was snapped securely in place.  There was difficulty putting the femoral component on it would not seat. Ultimately was found that the box cut was little too narrow. This was redone and the trial components seated very nicely. The definitive component was cleaned up. The initial tibial bearing was sacrificed and a fresh bearing was open for this. The knee was then thoroughly irrigated final time and the bone surfaces dried up. The tibial bearing was then snapped in position. The cement was mixed up for the second batch and the femur and patella were cemented without incident beyond this point. Excess was removed during initial impaction after partial set up. The knee was irrigated and ultimately shocker solution was placed for the appropriate time and then irrigated clean with sterile saline. The capsule was closed with 0 Vicryl figure-of-eight sutures and vancomycin powder was placed in the joint. After the capsule was sealed tranexamic acid solution was injected. The subcu was irrigated and then closed over the remaining vancomycin powder with 2-0 Vicryl. Stainless steel staples were used to secure the skin because of the paucity of subcu tissue. Aquacel Ag and a bulky Vanegas type dressing were applied. The tourniquet was deflated good circulation turned to the lower extremity. Patient was taken recovery in stable condition. GROSS PATHOLOGY: Advanced degeneration left knee with capsular contracture. Subchondral sclerosis. Chondrolysis. The synovial hypertrophy and hyperemia. Severe patellofemoral disease. COMPONENTS USED : [employer] triathlon primary cemented knee components with a size 3 tibial baseplate cemented. 9 mm posterior stabilized tibial bearing. Size 3 left posterior stabilized cemented femoral component. 27 mm symmetric polypatella. [employer] Simplex P bone cement.     All reasonable efforts have been made to minimize the risk of errors that may occur in the use of voice recognition and other electronic means of charting.       Electronically signed by Milli Fong DO on 10/12/21 at 10:54 AM EDT

## 2021-10-12 NOTE — ANESTHESIA PRE PROCEDURE
Department of Anesthesiology  Preprocedure Note       Name:  Yanira Holland   Age:  66 y.o.  :  1950                                          MRN:  [id number]         Date:  10/12/2021      Surgeon: Tracey Tam):  DONYA Alberto DO    Procedure: Procedure(s):  TOTAL LEFT KNEE REPLACEMENT ARTHROPLASTY (CE)    Medications prior to admission:   Prior to Admission medications    Medication Sig Start Date End Date Taking? Authorizing Provider   atorvastatin (LIPITOR) 20 MG tablet 20 mg daily  20   Historical Provider, MD   metoprolol tartrate (LOPRESSOR) 25 MG tablet 25 mg 2 times daily  20   Historical Provider, MD   losartan (COZAAR) 50 MG tablet 50 mg daily  20   Historical Provider, MD   amLODIPine (NORVASC) 10 MG tablet 5 mg daily  20   Historical Provider, MD   levothyroxine (SYNTHROID) 100 MCG tablet 75 mcg Daily  20   Historical Provider, MD   metFORMIN (GLUCOPHAGE) 500 MG tablet 2 times daily (with meals)  20   Historical Provider, MD   alendronate (FOSAMAX) 70 MG tablet 70 mg every 7 days  20   Historical Provider, MD   SUMAtriptan (IMITREX) 50 MG tablet once as needed  6/10/20   Historical Provider, MD       Current medications:    No current facility-administered medications for this visit. No current outpatient medications on file.      Facility-Administered Medications Ordered in Other Visits   Medication Dose Route Frequency Provider Last Rate Last Admin    ropivacaine (NAROPIN) 0.5% injection 30 mL  30 mL Infiltration Once Chela Be, DO        midazolam (VERSED) injection 1 mg  1 mg IntraVENous Q5 Min PRN Chela Be, DO        fentaNYL (SUBLIMAZE) injection 25 mcg  25 mcg IntraVENous PRN Chela Be, DO        scopolamine (TRANSDERM-SCOP) transdermal patch 1 patch  1 patch TransDERmal Once Chela Be, DO   1 patch at 10/12/21 0602    sodium chloride flush 0.9 % injection 5-40 mL  5-40 mL IntraVENous 2 times per day DONYA Summer Bingham, DO        sodium chloride flush 0.9 % injection 5-40 mL  5-40 mL IntraVENous PRN K Summer Bingham, DO        0.9 % sodium chloride infusion  25 mL IntraVENous PRN K Summer Bingham, DO        vancomycin (VANCOCIN) 1,000 mg in dextrose 5 % 250 mL IVPB  1,000 mg IntraVENous On Call to [facility], DO        tranexamic acid (CYKLOKAPRON) irrigation 1,000 mg  1,000 mg Intra-articular On Call to [facility], DO        0.9 % sodium chloride infusion   IntraVENous Continuous Graciela Arteaga MD 50 mL/hr at 10/12/21 0559 New Bag at 10/12/21 0559       Allergies: Allergies   Allergen Reactions    Penicillins     Sulfa Antibiotics        Problem List:    Patient Active Problem List   Diagnosis Code    Arthritis of left knee M17.12       Past Medical History:        Diagnosis Date    Arthritis     Diabetes mellitus ([facility] [address]     Hyperlipidemia     Hypertension     PONV (postoperative nausea and vomiting)     Spinal headache     Thyroid disease        Past Surgical History:        Procedure Laterality Date    BLADDER SUSPENSION      COLONOSCOPY      FOOT SURGERY Bilateral     bunions, hammer toes      FOOT SURGERY Right 2021    CORRECTION ANGULAR DEFORMITY RIGHT 5TH DIGIT, PARTIAL EXCISION OF BONE RIGHT 5TH DIGIT (CPT 36915) performed by Nalini Cruz DPM at [address]  1999    JOINT REPLACEMENT Right 2018    done in [place]   knee       Social History:    Social History     Tobacco Use    Smoking status: Former Smoker     Quit date:      Years since quittin.8    Smokeless tobacco: Never Used   Substance Use Topics    Alcohol use: Never                                Counseling given: Not Answered      Vital Signs (Current): There were no vitals filed for this visit.                                            BP Readings from Last 3 Encounters:   10/12/21 (!) 143/63   10/04/21 135/64   21 127/75       NPO Status:  2200 10/11/21                                                                                BMI:   Wt Readings from Last 3 Encounters:   10/12/21 163 lb (73.9 kg)   10/04/21 167 lb (75.8 kg)   08/17/21 163 lb (73.9 kg)     There is no height or weight on file to calculate BMI.    CBC:   Lab Results   Component Value Date    WBC 6.4 10/04/2021    RBC 4.34 10/04/2021    HGB 13.8 10/04/2021    HCT 41.6 10/04/2021    MCV 95.9 10/04/2021    RDW 12.4 10/04/2021     10/04/2021       CMP:   Lab Results   Component Value Date     10/04/2021    K 4.0 10/04/2021     10/04/2021    CO2 27 10/04/2021    BUN 16 10/04/2021    CREATININE 0.8 10/04/2021    GFRAA >60 10/04/2021    LABGLOM >60 10/04/2021    GLUCOSE 199 10/04/2021    PROT 7.6 10/04/2021    CALCIUM 9.3 10/04/2021    BILITOT 0.4 10/04/2021    ALKPHOS 170 10/04/2021    AST 27 10/04/2021    ALT 27 10/04/2021       POC Tests: No results for input(s): POCGLU, POCNA, POCK, POCCL, POCBUN, POCHEMO, POCHCT in the last 72 hours. Coags:   Lab Results   Component Value Date    PROTIME 10.8 10/04/2021    INR 0.9 10/04/2021    APTT 29.2 10/04/2021       HCG (If Applicable): No results found for: PREGTESTUR, PREGSERUM, HCG, HCGQUANT     ABGs: No results found for: PHART, PO2ART, AWS6SXK, BIA1NUO, BEART, Z2MZZTAH     Type & Screen (If Applicable):  No results found for: LABABO, LABRH    Drug/Infectious Status (If Applicable):  No results found for: HIV, HEPCAB    COVID-19 Screening (If Applicable):   Lab Results   Component Value Date    COVID19 Not Detected 07/15/2020           Anesthesia Evaluation  Patient summary reviewed and Nursing notes reviewed   history of anesthetic complications: PONV.   Airway: Mallampati: I  TM distance: >3 FB   Neck ROM: full  Mouth opening: > = 3 FB Dental:    (+) partials      Pulmonary:Negative Pulmonary ROS and normal exam  breath sounds clear to auscultation      (-) not a current smoker (ex smoker) Cardiovascular:  Exercise tolerance: good (>4 METS),   (+) hypertension:, hyperlipidemia      ECG reviewed  Rhythm: regular  Rate: normal           Beta Blocker:  Dose within 24 Hrs      ROS comment: EKG= SR      Cleared by PCP     Neuro/Psych:   (+) headaches: migraine headaches,              ROS comment: And after surgery  GI/Hepatic/Renal: Neg GI/Hepatic/Renal ROS       (-) GERD       Endo/Other:    (+) DiabetesType II DM, , hypothyroidism: arthritis: OA., . Pt had PAT visit. Abdominal:             Vascular: negative vascular ROS. Other Findings:               Anesthesia Plan      regional, spinal, MAC and general     ASA 2     (Hx PONV- Scopolamine patch placed. PCP clearance on chart  Discussed regional and spinal anesthesia with general anesthesia as back up. Right hand PIV )  Induction: intravenous. MIPS: Postoperative opioids intended and Prophylactic antiemetics administered. Anesthetic plan and risks discussed with patient. Use of blood products discussed with patient whom consented to blood products. Plan discussed with CRNA and attending. Tanna Petersen RN   10/12/2021      ------DOS anesthesiologist addendum-------------------  Patient seen and evaluated. Plan for spinal anesthesia with preoperative left saphenous peripheral nerve block discussed with patient. All patient's questions were answered to her satisfaction. Possibility for GETA for backup discussed with patient, in case spinal technique unsuccessful. Patient expresses understanding. Patient consents to and agrees to spinal anesthesia with preoperative left saphenous peripheral nerve block, with GETA for backup.   Alexander Moreno MD  10/12/21

## 2021-10-12 NOTE — PLAN OF CARE
Problem: Falls - Risk of:  Goal: Will remain free from falls  Description: Will remain free from falls  Outcome: Met This Shift  Goal: Absence of physical injury  Description: Absence of physical injury  Outcome: Met This Shift     Problem: Venous Thromboembolism:  Goal: Will show no signs or symptoms of venous thromboembolism  Description: Will show no signs or symptoms of venous thromboembolism  Outcome: Met This Shift  Goal: Absence of signs or symptoms of impaired coagulation  Description: Absence of signs or symptoms of impaired coagulation  Outcome: Met This Shift

## 2021-10-12 NOTE — ANESTHESIA PROCEDURE NOTES
Peripheral Block    Patient location during procedure: PACU  Start time: 10/12/2021 7:24 AM  End time: 10/12/2021 7:28 AM  Staffing  Performed: other anesthesia staff   Anesthesiologist: Brennan Luna MD  Resident/CRNA: DYLAN Lott - CRISTINE  Other anesthesia staff: Tanna Petersen RN  Preanesthetic Checklist  Completed: patient identified, IV checked, site marked, risks and benefits discussed, surgical consent, monitors and equipment checked, pre-op evaluation, timeout performed, anesthesia consent given, oxygen available and patient being monitored  Peripheral Block  Patient position: supine  Prep: ChloraPrep  Patient monitoring: continuous pulse ox, frequent blood pressure checks, IV access and cardiac monitor  Block type: Saphenous  Laterality: left  Injection technique: single-shot  Guidance: ultrasound guided  Local infiltration: ropivacaine  Infiltration strength: 0.5 %  Dose: 20 mL  Provider prep: mask, sterile gloves and sterile gown  Local infiltration: ropivacaine  Needle  Needle type: combined needle/nerve stimulator   Needle gauge: 20 G  Needle length: 10 cm  Needle localization: ultrasound guidance  Assessment  Injection assessment: negative aspiration for heme, no paresthesia on injection and local visualized surrounding nerve on ultrasound  Paresthesia pain: none  Slow fractionated injection: yes  Hemodynamics: stable  Additional Notes  Time out performed.     Reason for block: post-op pain management

## 2021-10-12 NOTE — CONSULTS
Department of Internal Medicine        HISTORY OF PRESENT ILLNESS:      The patient is a 66 y.o. female who presents with having elective left TKA performed secondary to chronic left knee discomfort. Patient is seen postop. Patient has expected postop discomfort. Patient has had multiple episodes of nausea/vomiting since surgery. She denies any coffee-ground or hematemesis. There is no associated abdominal pain with it. He denies any problem chest pain, dizziness or unusual shortness of breath. The patient said the Zofran did help some but did not totally relieve it. Past Medical History:    Past Medical History:   Diagnosis Date    Arthritis     Diabetes mellitus ([address]     Hyperlipidemia     Hypertension     PONV (postoperative nausea and vomiting)     Spinal headache     Thyroid disease      Past Surgical History:    Past Surgical History:   Procedure Laterality Date    BLADDER SUSPENSION  2016    COLONOSCOPY      FOOT SURGERY Bilateral     bunions, hammer toes      FOOT SURGERY Right 8/17/2021    CORRECTION ANGULAR DEFORMITY RIGHT 5TH DIGIT, PARTIAL EXCISION OF BONE RIGHT 5TH DIGIT (CPT 97922) performed by Esteban Siemens, DPM at [address]  02/1999    JOINT REPLACEMENT Right 05/2018    done in [place]   knee       Medications Prior to Admission:    @  Prior to Admission medications    Medication Sig Start Date End Date Taking?  Authorizing Provider   metoprolol tartrate (LOPRESSOR) 25 MG tablet 25 mg 2 times daily  5/28/20  Yes Historical Provider, MD   amLODIPine (NORVASC) 10 MG tablet 5 mg daily  6/20/20  Yes Historical Provider, MD   levothyroxine (SYNTHROID) 100 MCG tablet 75 mcg Daily  6/4/20  Yes Historical Provider, MD   atorvastatin (LIPITOR) 20 MG tablet 20 mg daily  11/23/20   Historical Provider, MD   losartan (COZAAR) 50 MG tablet 50 mg daily  5/28/20   Historical Provider, MD   metFORMIN (GLUCOPHAGE) 500 MG tablet 2 times daily (with meals)  6/13/20 Historical Provider, MD   alendronate (FOSAMAX) 70 MG tablet 70 mg every 7 days  20   Historical Provider, MD   SUMAtriptan (IMITREX) 50 MG tablet once as needed  6/10/20   Historical Provider, MD       Allergies:  Penicillins and Sulfa antibiotics    Social History:   Social History     Socioeconomic History    Marital status:      Spouse name: Not on file    Number of children: Not on file    Years of education: Not on file    Highest education level: Not on file   Occupational History    Not on file   Tobacco Use    Smoking status: Former Smoker     Quit date:      Years since quittin.8    Smokeless tobacco: Never Used   Substance and Sexual Activity    Alcohol use: Never    Drug use: Never    Sexual activity: Not on file   Other Topics Concern    Not on file   Social History Narrative    Not on file     Social Determinants of Health     Financial Resource Strain:     Difficulty of Paying Living Expenses:    Food Insecurity:     Worried About [address] in the Last Year:     [address] [employer] Items in the Last Year:    Transportation Needs:     Lack of Transportation (Medical):  Lack of Transportation (Non-Medical):    Physical Activity:     Days of Exercise per Week:     Minutes of Exercise per Session:    Stress:     Feeling of Stress :    Social Connections:     Frequency of Communication with Friends and Family:     Frequency of Social Gatherings with Friends and Family:     Attends [employer] Services:     Active Member of Clubs or Organizations:     Attends Club or Organization Meetings:     Marital Status:    Intimate Partner Violence:     Fear of Current or Ex-Partner:     Emotionally Abused:     Physically Abused:     Sexually Abused:        Family History:   History reviewed. No pertinent family history. REVIEW OF SYSTEMS:    Gen: Patient denies any lightheadedness or dizziness. No LOC or syncope. No fevers or chills.     HEENT: No earache, sore throat or nasal congestion. Resp: Denies cough, hemoptysis or sputum production. Cardiac: Denies chest pain, SOB, diaphoresis or palpitations. GI: No nausea, vomiting, diarrhea or constipation. No melena or hematochezia. : No urinary complaints, dysuria, hematuria or frequency. MSK: + Left knee pain. No extremity weakness, paralysis or paresthesias. PHYSICAL EXAM:    Vitals:  /73   Pulse 66   Temp 97.6 °F (36.4 °C) (Oral)   Resp 16   Ht 5' 6\" (1.676 m)   Wt 170 lb (77.1 kg)   SpO2 96%   BMI 27.44 kg/m²     General:  This is a 66 y.o. yo female who is alert and oriented in moderate postop discomfort. HEENT:  Head is normocephalic and atraumatic, PERRLA, EOMI, mucus membranes moist with no pharyngeal erythema or exudate. Neck:  Supple with no carotid bruits, JVD or thyromegaly.   No cervical adenopathy  CV:  Regular rate and rhythm, no murmurs  Lungs:  Clear to auscultation bilaterally with no wheezes, rales or rhonchi  Abdomen:  Soft, nontender,  mildly distended, bowel sounds present  Extremities:  + Left knee edema postop, peripheral pulses intact bilaterally  Neuro:  Cranial nerves II-XII grossly intact; motor and sensory function intact with no focal deficits  Skin:  No rashes, lesions or wounds      DATA:  CBC with Differential:    Lab Results   Component Value Date    WBC 6.4 10/04/2021    RBC 4.34 10/04/2021    HGB 13.8 10/04/2021    HCT 41.6 10/04/2021     10/04/2021    MCV 95.9 10/04/2021    MCH 31.8 10/04/2021    MCHC 33.2 10/04/2021    RDW 12.4 10/04/2021    LYMPHOPCT 15.3 10/04/2021    MONOPCT 9.1 10/04/2021    BASOPCT 0.9 10/04/2021    MONOSABS 0.58 10/04/2021    LYMPHSABS 0.97 10/04/2021    EOSABS 0.38 10/04/2021    BASOSABS 0.06 10/04/2021     CMP:    Lab Results   Component Value Date     10/04/2021    K 4.0 10/04/2021     10/04/2021    CO2 27 10/04/2021    BUN 16 10/04/2021    CREATININE 0.8 10/04/2021    GFRAA >60 10/04/2021    LABGLOM >60 10/04/2021    GLUCOSE 199 10/04/2021    PROT 7.6 10/04/2021    LABALBU 4.0 10/04/2021    CALCIUM 9.3 10/04/2021    BILITOT 0.4 10/04/2021    ALKPHOS 170 10/04/2021    AST 27 10/04/2021    ALT 27 10/04/2021     Magnesium:  No results found for: MG  Phosphorus:  No results found for: PHOS  PT/INR:    Lab Results   Component Value Date    PROTIME 10.8 10/04/2021    INR 0.9 10/04/2021     Troponin:  No results found for: TROPONINI  U/A:    Lab Results   Component Value Date    COLORU Yellow 10/04/2021    PROTEINU Negative 10/04/2021    PHUR 6.0 10/04/2021    WBCUA 10-20 10/04/2021    RBCUA NONE 10/04/2021    BACTERIA MANY 10/04/2021    CLARITYU Clear 10/04/2021    SPECGRAV 1.010 10/04/2021    LEUKOCYTESUR SMALL 10/04/2021    UROBILINOGEN 0.2 10/04/2021    BILIRUBINUR Negative 10/04/2021    BLOODU Negative 10/04/2021    GLUCOSEU Negative 10/04/2021     ABG:  No results found for: PH, PCO2, PO2, HCO3, BE, THGB, TCO2, O2SAT  HgBA1c:  No results found for: LABA1C  FLP:  No results found for: TRIG, HDL, LDLCALC, LDLDIRECT, LABVLDL  TSH:  No results found for: TSH  IRON:  No results found for: IRON  LIPASE:  No results found for: LIPASE    ASSESSMENT AND PLAN:      Patient Active Problem List    Diagnosis Date Noted    Arthritis of left knee-status post left TKA 10/12 10/12/2021    Hypertension     Hyperlipidemia     Thyroid disease      Noninsulin-dependent diabetes mellitus ([facility] [address]  + Nausea/vomiting postop      Plan:  Patient admitted to medical surgical floor  Home medications reviewed  Monitor heart rate, blood pressure, O2 saturations  Glucoscans 4 times daily with sliding scale insulin  Phenergan 25 mg IM every 6. Routine labs in a.mario Hernandez DO, D.OBoby  10/12/2021  2:29 PM

## 2021-10-12 NOTE — CARE COORDINATION
10/12/2021: SS Note/Discharge planning:  SS Consult noted for post-op d/c planning and confirmed with pt d/c plan to return home tomorrow with [facility], confirmed with [facility] liaison that they have accepted referral for start of care on Thursday/Friday, pt info and home care order faxed to agency, nursing informed. Electronically signed by MAGED Mejia on 10/12/2021 at 2:15 PM

## 2021-10-12 NOTE — PROGRESS NOTES
[address] [facility]  [facility] [place] Aguilar. [place]        Date:10/12/2021                                                  Patient Name: Cornelio Aleman    MRN: [id number]    : 1950    Room: [address] [place] [address]-      Evaluating OT: Daniel Donnelly OTR/L; [id number]     Referring Provider and Specific Provider Orders/Date:      10/12/21 1045  OT eval and treat Start: 10/12/21 1045, End: 10/12/21 1045, ONE TIME, Standing Count: 1 Occurrences, R      K Derek Ripper, DO     Placement Recommendation: HHOT       Diagnosis:   1.  Primary osteoarthritis of left knee         Surgery: L TKA       Pertinent Medical History:       Past Medical History:   Diagnosis Date    Arthritis     Diabetes mellitus ([address]     Hyperlipidemia     Hypertension     PONV (postoperative nausea and vomiting)     Spinal headache     Thyroid disease          Past Surgical History:   Procedure Laterality Date    BLADDER SUSPENSION      COLONOSCOPY      FOOT SURGERY Bilateral     bunions, hammer toes      FOOT SURGERY Right 2021    CORRECTION ANGULAR DEFORMITY RIGHT 5TH DIGIT, PARTIAL EXCISION OF BONE RIGHT 5TH DIGIT (CPT 66283) performed by Anabella Chowdhury DPM at [facility]  1999    JOINT REPLACEMENT Right 2018    done in [place]   knee      Precautions:  Fall Risk, full weight bearing: L LE d/t TKA, nausea      Assessment of current deficits    [x] Functional mobility  [x]ADLs  [x] Strength               []Cognition    [x] Functional transfers   [x] IADLs         [] Safety Awareness   [x]Endurance    [] Fine Coordination              [x] Balance      [] Vision/perception   []Sensation     []Gross Motor Coordination  [x] ROM  [] Delirium                   [] Motor Control     OT PLAN OF CARE   OT POC based on physician orders, patient diagnosis and results of clinical assessment    Frequency/Duration 1-3 days/wk for 2 weeks PRN     Specific OT Treatment Interventions to include:   * Instruction/training on adapted ADL techniques and AE recommendations to increase functional independence within precautions       * Training on energy conservation strategies, correct breathing pattern and techniques to improve independence/tolerance for self-care routine  * Functional transfer/mobility training/DME recommendations for increased independence, safety, and fall prevention  * Patient/Family education to increase follow through with safety techniques and functional independence  * Recommendation of environmental modifications for increased safety with functional transfers/mobility and ADLs  * Therapeutic exercise to improve motor endurance, ROM, and functional strength for ADLs/functional transfers  * Therapeutic activities to facilitate/challenge dynamic balance, stand tolerance for increased safety and independence with ADLs  * Positioning to improve skin integrity, interaction with environment and functional independence    Recommended Adaptive Equipment: none      Home Living: with significant other; single family home, 1 story, 3-4 steps to enter with rail, walk in shower. Equipment owned: wheeled walker, cane, bedside commode, built in shower chair, grab bars     Prior Level of Function: Independent with ADLs , Independent with IADLs; ambulated with no device    Driving: yes  Occupation: retired     Pain Level: 5/10 pain in L knee; Nursing notified.       Cognition: A&O: 4/4; Follows 3 step directions   Memory: good    Sequencing: good    Problem solving: good    Judgement/safety: good     [facility]   AM-PAC Daily Activity Inpatient   How much help for putting on and taking off regular lower body clothing?: Total  How much help for Bathing?: A Lot  How much help for Toileting?: A Little  How much help for putting on and taking off regular upper body clothing?: A Lot  How much help for taking care of personal grooming?: A Little  How much help for eating meals?: None  AM-PAC Inpatient Daily Activity Raw Score: 15  AM-PAC Inpatient ADL T-Scale Score : 34.69  ADL Inpatient CMS 0-100% Score: 56.46  ADL Inpatient CMS G-Code Modifier : CK     Functional Assessment:    Initial Eval Status  Date: 10/12/21 Treatment Status  Date: STGs = LTGs  Time frame: 10-14 days   Feeding Independent   Independent    Grooming Minimal Assist   Independent    UB Dressing Minimal Assist    Independent    LB Dressing Dependent   Supervision    Bathing Moderate Assist  Modified [place]    Toileting Supervision for hygiene after using bedside commode   Independent    Bed Mobility  Supine to sit: Minimal Assist   Sit to supine: N/T    Supine to sit: Independent   Sit to supine: Independent    Functional Transfers Maximal assist x 1 with wheeled walker upon 1st attempt to stand, buckling of L LE so pt was returned to edge of bed. Maximal assist x 2 with wheeled walker upon 2nd stand, buckling. Maximal assist x 2 with hand held assist x 2 and assistance to block B LE in extension d/t buckling, to and from bedside commode. Transfer training with verbal cues for hand placement throughout session to improve safety. Independent    Functional Mobility Maximal assist x 2 with hand held assist x 2 to improve balance, verbal cues for walker sequence and safety. Modified [place]    Balance Sitting:     Static: good     Dynamic: fair   Standing: poor with hand held assist x 2      Activity Tolerance fair   good    Visual/  Perceptual Glasses: no                 Hand Dominance: right      AROM (PROM) Strength Additional Info:    RUE  WFL 4/5 good  and wfl FMC/dexterity noted during ADL tasks     LUE WFL 4/5 good  and wfl FMC/dexterity noted during ADL tasks       Hearing: WFL   Sensation:  No c/o numbness or tingling  Tone: WFL  Edema: yes, surgical extremity     Comments: Upon arrival the patient was supine.   At end of session, patient was seated EOB and left in care of PT. Call light and phone within reach, all lines and tubes intact. Overall patient demonstrated decreased independence and safety during completion of ADL/functional transfer/mobility tasks. Pt would benefit from continued skilled OT to increase safety and independence with completion of ADL/IADL tasks for functional independence and quality of life. Treatment: OT treatment provided this date includes:    Instruction/training on safety and adapted techniques for completion of ADLs    Instruction/training on safe functional mobility/transfer techniques    Instruction/training on energy conservation/work simplification for completion of ADLs    Proper Positioning/Alignment    Rehab Potential: Good for established goals. Patient / Family Goal: return home       Patient and/or family were instructed on functional diagnosis, prognosis/goals and OT plan of care. Demonstrated good understanding. Eval Complexity: Low    Time In: 1:50pm   Time Out: 2:15pm    Total Treatment Time: 0      Min Units   OT Eval Low 97165  X  1    OT Eval Medium 11064      OT Eval High 87302      OT Re-Eval [id number]            ADL/Self Care 31456     Therapeutic Activities 06890       Therapeutic Ex 24934       Orthotic Management 43251       Manual [id number]     Neuro Re-Ed 13085       Non-Billable Time        Evaluation Time additionally includes thorough review of current medical information, gathering information on past medical history/social history and prior level of function, interpretation of standardized testing/informal observation of tasks, assessment of data and development of plan of care and goals.         Evaluating OT: Tatum Hewitt OTR/L; [id number]

## 2021-10-12 NOTE — H&P
History and Physical      CHIEF COMPLAINT: Knee pain    HISTORY OF PRESENT ILLNESS:      Progressive worsening over long period of time with deformity and difficulty with ambulation    Past Medical History:        Diagnosis Date    Arthritis     Diabetes mellitus ([address]     Hyperlipidemia     Hypertension     PONV (postoperative nausea and vomiting)     Spinal headache     Thyroid disease      Past Surgical History:        Procedure Laterality Date    BLADDER SUSPENSION  2016    COLONOSCOPY      FOOT SURGERY Bilateral     bunions, hammer toes      FOOT SURGERY Right 8/17/2021    CORRECTION ANGULAR DEFORMITY RIGHT 5TH DIGIT, PARTIAL EXCISION OF BONE RIGHT 5TH DIGIT (CPT 27600) performed by Juan Bean DPM at [address]  02/1999    JOINT REPLACEMENT Right 05/2018    done in [place]   knee     Social History:    TOBACCO:   reports that she quit smoking about 35 years ago. She has never used smokeless tobacco.  ETOH:   reports no history of alcohol use. DRUGS:   reports no history of drug use. Family History:   History reviewed. No pertinent family history.   Medications Prior to Admission:  Medications Prior to Admission: metoprolol tartrate (LOPRESSOR) 25 MG tablet, 25 mg 2 times daily   amLODIPine (NORVASC) 10 MG tablet, 5 mg daily   levothyroxine (SYNTHROID) 100 MCG tablet, 75 mcg Daily   atorvastatin (LIPITOR) 20 MG tablet, 20 mg daily   losartan (COZAAR) 50 MG tablet, 50 mg daily   metFORMIN (GLUCOPHAGE) 500 MG tablet, 2 times daily (with meals)   alendronate (FOSAMAX) 70 MG tablet, 70 mg every 7 days   SUMAtriptan (IMITREX) 50 MG tablet, once as needed   Allergies:  Penicillins and Sulfa antibiotics    CONSTITUTIONAL:  negative for  chills and anorexia  HEENT:  negative for  tinnitus  RESPIRATORY:  negative for  dyspnea and cyanosis  CARDIOVASCULAR:  negative for  palpitations, syncope  GASTROINTESTINAL:  negative for vomiting and hematemesis  GENITOURINARY:  negative for hematuria  ENDOCRINE:  negative for tremor  MUSCULOSKELETAL:  positive for  pain, stiff joints and decreased range of motion  NEUROLOGICAL:  negative for seizures and syncope  BEHAVIOR/PSYCH:  negative for agitated and anxiety    PHYSICAL EXAM:  BP (!) 143/63   Pulse 68   Temp 97.6 °F (36.4 °C) (Infrared)   Resp 16   Ht 5' 6\" (1.676 m)   Wt 163 lb (73.9 kg)   SpO2 95%   BMI 26.31 kg/m²   General appearance:  awake, alert, cooperative, no apparent distress, and appears stated age  Neurologic:  Awake, alert, oriented to name, place and time. Cranial nerves II-XII are grossly intact. Motor is 5 out of 5 bilaterally. Cerebellar finger to nose, heel to shin intact. Sensory is intact.   Babinski down going, Romberg negative, and gait is normal.  Lungs:  No increased work of breathing, good air exchange, clear to auscultation bilaterally, no crackles or wheezing  Heart:  Normal apical impulse, regular rate and rhythm, normal S1 and S2, no S3 or S4, and no murmur noted  Abdomen:  normal bowel sounds  Skin: warm and dry, no rash or erythema  ENT: tympanic membrane, external ear and ear canal normal bilaterally, oropharynx clear and moist with normal mucous membranes  Musculoskeletal: Decreased range of motion left knee with malalignment crepitus and swelling and altered gait    General Labs:  CBC:   Lab Results   Component Value Date    WBC 6.4 10/04/2021    RBC 4.34 10/04/2021    HGB 13.8 10/04/2021    HCT 41.6 10/04/2021    MCV 95.9 10/04/2021    RDW 12.4 10/04/2021     10/04/2021     CMP:    Lab Results   Component Value Date     10/04/2021    K 4.0 10/04/2021     10/04/2021    CO2 27 10/04/2021    BUN 16 10/04/2021    PROT 7.6 10/04/2021     U/A:  No components found for: Karl Garcias, USPGRAV, UPH, Jonatan Gumichelleing, UKETONE, UBILI, UBLOOD, UNITRITE, UUROBIL, [place], QEPI, [place], [facility] [place], [place], [facility], [place]    Radiology: Degenerative arthrosis left knee    ASSESSMENT AND PLAN:    Left total knee replacement arthroplasty      Electronically signed by Altagracia Yao DO on 10/12/2021 at 7:04 AM

## 2021-10-13 VITALS
DIASTOLIC BLOOD PRESSURE: 62 MMHG | RESPIRATION RATE: 18 BRPM | HEIGHT: 66 IN | TEMPERATURE: 98.5 F | OXYGEN SATURATION: 98 % | SYSTOLIC BLOOD PRESSURE: 132 MMHG | WEIGHT: 170 LBS | BODY MASS INDEX: 27.32 KG/M2 | HEART RATE: 78 BPM

## 2021-10-13 LAB
ANION GAP SERPL CALCULATED.3IONS-SCNC: 10 MMOL/L (ref 7–16)
BUN BLDV-MCNC: 19 MG/DL (ref 6–23)
CALCIUM SERPL-MCNC: 8 MG/DL (ref 8.6–10.2)
CHLORIDE BLD-SCNC: 108 MMOL/L (ref 98–107)
CO2: 21 MMOL/L (ref 22–29)
CREAT SERPL-MCNC: 0.7 MG/DL (ref 0.5–1)
GFR AFRICAN AMERICAN: >60
GFR NON-AFRICAN AMERICAN: >60 ML/MIN/1.73
GLUCOSE BLD-MCNC: 162 MG/DL (ref 74–99)
HBA1C MFR BLD: 6.7 % (ref 4–5.6)
HCT VFR BLD CALC: 34.1 % (ref 34–48)
HEMOGLOBIN: 11.2 G/DL (ref 11.5–15.5)
METER GLUCOSE: 142 MG/DL (ref 74–99)
METER GLUCOSE: 147 MG/DL (ref 74–99)
POTASSIUM SERPL-SCNC: 4.3 MMOL/L (ref 3.5–5)
SODIUM BLD-SCNC: 139 MMOL/L (ref 132–146)

## 2021-10-13 PROCEDURE — 85018 HEMOGLOBIN: CPT

## 2021-10-13 PROCEDURE — G0378 HOSPITAL OBSERVATION PER HR: HCPCS

## 2021-10-13 PROCEDURE — 83036 HEMOGLOBIN GLYCOSYLATED A1C: CPT

## 2021-10-13 PROCEDURE — 97530 THERAPEUTIC ACTIVITIES: CPT

## 2021-10-13 PROCEDURE — 82962 GLUCOSE BLOOD TEST: CPT

## 2021-10-13 PROCEDURE — 85014 HEMATOCRIT: CPT

## 2021-10-13 PROCEDURE — 80048 BASIC METABOLIC PNL TOTAL CA: CPT

## 2021-10-13 PROCEDURE — 97110 THERAPEUTIC EXERCISES: CPT

## 2021-10-13 PROCEDURE — 97116 GAIT TRAINING THERAPY: CPT

## 2021-10-13 PROCEDURE — 36415 COLL VENOUS BLD VENIPUNCTURE: CPT

## 2021-10-13 PROCEDURE — 97535 SELF CARE MNGMENT TRAINING: CPT

## 2021-10-13 PROCEDURE — 2580000003 HC RX 258: Performed by: ORTHOPAEDIC SURGERY

## 2021-10-13 PROCEDURE — 6370000000 HC RX 637 (ALT 250 FOR IP): Performed by: ORTHOPAEDIC SURGERY

## 2021-10-13 PROCEDURE — 6370000000 HC RX 637 (ALT 250 FOR IP): Performed by: INTERNAL MEDICINE

## 2021-10-13 RX ORDER — OXYCODONE HYDROCHLORIDE 5 MG/1
5 TABLET ORAL EVERY 6 HOURS PRN
Qty: 28 TABLET | Refills: 0 | Status: SHIPPED | OUTPATIENT
Start: 2021-10-13 | End: 2021-10-20

## 2021-10-13 RX ORDER — ASPIRIN 325 MG
325 TABLET, DELAYED RELEASE (ENTERIC COATED) ORAL DAILY
Qty: 30 TABLET | Refills: 0 | Status: SHIPPED | OUTPATIENT
Start: 2021-10-13 | End: 2022-01-14

## 2021-10-13 RX ADMIN — AMLODIPINE BESYLATE 5 MG: 5 TABLET ORAL at 08:22

## 2021-10-13 RX ADMIN — INSULIN LISPRO 2 UNITS: 100 INJECTION, SOLUTION INTRAVENOUS; SUBCUTANEOUS at 07:59

## 2021-10-13 RX ADMIN — METOPROLOL TARTRATE 25 MG: 25 TABLET, FILM COATED ORAL at 07:59

## 2021-10-13 RX ADMIN — INSULIN LISPRO 2 UNITS: 100 INJECTION, SOLUTION INTRAVENOUS; SUBCUTANEOUS at 11:43

## 2021-10-13 RX ADMIN — OXYCODONE 5 MG: 5 TABLET ORAL at 04:18

## 2021-10-13 RX ADMIN — ATORVASTATIN CALCIUM 20 MG: 20 TABLET, FILM COATED ORAL at 07:59

## 2021-10-13 RX ADMIN — OXYCODONE 5 MG: 5 TABLET ORAL at 14:06

## 2021-10-13 RX ADMIN — METFORMIN HYDROCHLORIDE 500 MG: 500 TABLET ORAL at 07:59

## 2021-10-13 RX ADMIN — ACETAMINOPHEN 650 MG: 325 TABLET ORAL at 11:51

## 2021-10-13 RX ADMIN — ASPIRIN 325 MG: 325 TABLET, COATED ORAL at 07:59

## 2021-10-13 RX ADMIN — LOSARTAN POTASSIUM 50 MG: 50 TABLET, FILM COATED ORAL at 07:59

## 2021-10-13 RX ADMIN — OXYCODONE 5 MG: 5 TABLET ORAL at 08:09

## 2021-10-13 RX ADMIN — ACETAMINOPHEN 650 MG: 325 TABLET ORAL at 06:51

## 2021-10-13 RX ADMIN — SENNOSIDES AND DOCUSATE SODIUM 1 TABLET: 50; 8.6 TABLET ORAL at 08:00

## 2021-10-13 RX ADMIN — SODIUM CHLORIDE, PRESERVATIVE FREE 10 ML: 5 INJECTION INTRAVENOUS at 08:19

## 2021-10-13 RX ADMIN — LEVOTHYROXINE SODIUM 75 MCG: 0.07 TABLET ORAL at 06:51

## 2021-10-13 ASSESSMENT — PAIN DESCRIPTION - ONSET
ONSET: GRADUAL
ONSET: ON-GOING

## 2021-10-13 ASSESSMENT — PAIN SCALES - GENERAL
PAINLEVEL_OUTOF10: 7
PAINLEVEL_OUTOF10: 7
PAINLEVEL_OUTOF10: 6
PAINLEVEL_OUTOF10: 5
PAINLEVEL_OUTOF10: 3
PAINLEVEL_OUTOF10: 6

## 2021-10-13 ASSESSMENT — PAIN DESCRIPTION - PAIN TYPE
TYPE: SURGICAL PAIN
TYPE: SURGICAL PAIN

## 2021-10-13 ASSESSMENT — PAIN DESCRIPTION - LOCATION
LOCATION: KNEE
LOCATION: KNEE

## 2021-10-13 ASSESSMENT — PAIN DESCRIPTION - FREQUENCY
FREQUENCY: CONTINUOUS
FREQUENCY: CONTINUOUS

## 2021-10-13 ASSESSMENT — PAIN DESCRIPTION - DESCRIPTORS
DESCRIPTORS: ACHING
DESCRIPTORS: ACHING

## 2021-10-13 ASSESSMENT — PAIN DESCRIPTION - ORIENTATION
ORIENTATION: LEFT
ORIENTATION: POSTERIOR;LEFT

## 2021-10-13 ASSESSMENT — PAIN DESCRIPTION - PROGRESSION: CLINICAL_PROGRESSION: NOT CHANGED

## 2021-10-13 NOTE — PROGRESS NOTES
Physical Therapy Treatment Note/Plan of Care    Room #:  0317/0317-01  Patient Name: Sagar Allen  YOB: 1950  MRN: [id number]    Date of Service: 10/13/2021     Tentative placement recommendation: [address] [facility] 5 days a week   Equipment recommendation: Patient has needed equipment       Evaluating Physical Therapist: Eliana Zayas #[id number]     Specific Provider Orders/Date/Referring Provider :  10/12/21 1045   PT eval and treat Start: 10/12/21 1045, End: 10/12/21 1045, ONE TIME, Standing Count: 1 Occurrences, R    K Colin Hy, DO      Admitting Diagnosis:   Primary osteoarthritis of left knee [M17.12]  Arthritis of left knee [M17.12]   Visit diagnosis:   Visit Diagnoses       Codes    Primary osteoarthritis of left knee    -  Primary M17.12        Surgery:   DATE OF PROCEDURE: 10/12/2021   SURGEON: Eduard Reyes    OPERATION: Left total knee replacement arthroplasty    Patient Active Problem List   Diagnosis    Arthritis of left knee    Hypertension    Hyperlipidemia    Thyroid disease    Diabetes mellitus ([facility] [address] [facility]       ASSESSMENT of current deficits: Patient exhibits decreased strength, balance, endurance, range of motion and pain left knee impairing functional mobility, transfers, gait , gait distance and tolerance to activity. Patient needed cues  to extend left knee in stance phase of gait and walker approximation. Patient able to perform stairs with no loss of balance but needed cueing for sequencing and safety. Patient requires skilled physical therapy to address concerns listed above to increase safety and independence at discharge.          PHYSICAL THERAPY  PLAN OF CARE       Physical therapy plan of care is established based on physician order,  patient diagnosis and clinical assessment    Current Treatment Recommendations:    -Bed Mobility: Lower extremity exercises   -Sitting Balance: Incorporate reaching activities to activate trunk muscles , Facilitate active trunk muscle engagement  and Facilitate postural control in all planes   -Standing Balance: Perform strengthening exercises in standing to promote motor control with or without upper extremity support   -Transfers: Provide instruction on proper hand and foot position for adequate transfer of weight onto lower extremities and use of gait device if needed and Cues for hand placement, technique and safety. Provide stabilization to prevent fall   -Gait: Gait training, Standing activities to improve: base of support, weight shift, weight bearing  and Pregait training to emphasize: Sequencing , Weight shift, Device control, left knee extension in stance phase of gait and Safety   -Endurance: Utilize Supervised activities to increase level of endurance to allow for safe functional mobility including transfers and gait   -Stairs: Stair training with instruction on proper technique and hand placement on rail    PT long term treatment goals are located in below grid    Patient and or family understand(s) diagnosis, prognosis, and plan of care. Frequency of treatments: Patient will be seen  twice daily  for therapeutic exercise, functional retraining, endurance activities, balance exercises, family and patient education.        Prior Level of Function: Patient ambulated independently    Rehab Potential: good  - for baseline    Past medical history:   Past Medical History:   Diagnosis Date    Arthritis     Diabetes mellitus ([facility] [address] [facility]     Hyperlipidemia     Hypertension     PONV (postoperative nausea and vomiting)     Spinal headache     Thyroid disease      Past Surgical History:   Procedure Laterality Date    BLADDER SUSPENSION  2016    COLONOSCOPY      FOOT SURGERY Bilateral     bunions, hammer toes      FOOT SURGERY Right 8/17/2021    CORRECTION ANGULAR DEFORMITY RIGHT 5TH DIGIT, PARTIAL EXCISION OF BONE RIGHT 5TH DIGIT (CPT 50709) performed by Femi Domínguez DPM at [address]  02/1999    JOINT REPLACEMENT Right 05/2018    done in [place]   knee    TOTAL KNEE ARTHROPLASTY Left 10/12/2021    TOTAL LEFT KNEE REPLACEMENT ARTHROPLASTY (CE) performed by Mono Edwards DO at [address]:    Precautions:   , falls ,left lower extremity FWB (full weight bearing) urinary incontinence    Social history: Patient lives with significant other, Erin Pontiff in a ranch home  with 3-4 steps  to enter bilateral [employer] in shower   built in [address] owned: [employer], Mireya Fearing and Bedside commode,       [facility]   How much difficulty turning over in bed?: A Little  How much difficulty sitting down on / standing up from a chair with arms?: A Little  How much difficulty moving from lying on back to sitting on side of bed?: A Little  How much help from another person moving to and from a bed to a chair?: A Little  How much help from another person needed to walk in hospital room?: A Little  How much help from another person for climbing 3-5 steps with a railing?: A Little  AM-PAC Inpatient Mobility Raw Score : 18  AM-PAC Inpatient T-Scale Score : 43.63  Mobility Inpatient CMS 0-100% Score: 46.58  Mobility Inpatient CMS G-Code Modifier : CK    Nursing cleared patient for PT treatment. OBJECTIVE:   Initial Evaluation  Date: 10/12/2021 Treatment Date:  10/13/2021   Short Term/ Long Term   Goals   Was pt agreeable to Eval/treatment? Yes Yes     Pain Level  5/10   Left knee    7/10   Left knee     Bed Mobility  Rolling: Not assessed     Supine to sit: Not assessed     Sit to supine: Minimal assist of 1    Scooting: Minimal assist of 1   Rolling: Not assessed patient in chair   Supine to sit: Not assessed patient in chair   Sit to supine: Not assessed patient in chair   Scooting: Not assessed patient in chair    Rolling: Independent    Supine to sit: Independent    Sit to supine: Independent    Scooting: Independent     Transfers Sit to stand:  Moderate assist of  2 left knee buckling requiring knee block assist of 2 for safety Sit to stand: Supervision   cues for hand/foot placement and safety. Sit to stand: Modified Independent     Ambulation    2 x 3 feet using  hand held assist with Maximal assist of  2   Patient with flexed posture and left knee buckling and cues for sequencing, upright posture, left knee extension in stance phase of gait and safety 70 feet using  wheeled walker with Minimal assist of 1   Patient with flexed posture and cues for sequencing, upright posture, walker approximation, left knee extension in stance phase of gait and safety   100 feet using  wheeled walker with Modified Independent    Stair negotiation: ascended and descended   Not assessed    7 steps ascend/descend with bilateral rails with Supervision. Cues for sequencing and safety.      4 steps 1 rail with supervision    ROM Within functional limits except Left knee 5-80°     Increase range of motion 10% of affected joints    Strength Within functional limits  except  Left lower extremity 3-/5  Within functional limits   Balance Sitting EOB:  fair  +  Dynamic Standing:  poor left knee buckling requiring blocking wheeled walker  Sitting EOB: fair+   Dynamic Standing: fairWW    Sitting EOB:  good    Dynamic Standing:  good wheeled walker      Patient is Alert & Oriented x person, place, time and situation and follows directions    Sensation:  Patient reports numbness/tingling left lower extremity    Edema:  yes left lower extremity   Vitals: room air  Blood Pressure at rest   Blood Pressure during session     Heart Rate at rest  Heart Rate during session     SPO2 at rest %  SPO2 during session  %     Patient education  Patient educated on role of Physical Therapy, risks of immobility, safety and plan of care,  importance of mobility while in hospital , ankle pumps, quad set and glut set for edema control, blood clot prevention, weight bearing status  and left knee extension in bed and during stance phase of gait      Patient response to education:   Pt verbalized understanding Pt demonstrated skill Pt requires further education in this area   Yes Partial Yes       Treatment:  Patient practiced and was instructed in the following treatment:     Therapist educated and facilitated patient on techniques to increase safety and independence with bed mobility, balance, functional transfers, and functional mobility. Patient sitting in a chair at start of tx session. educated patient on gait sequencing and left knee extension in stance phase of gait. Pt stood, ambulated in the hallway, performed stair training, and back to the w/c. Pt transported back to her room by w/c. All questions answered appropriately for patient. At end of session, patient in chair with     call light and phone within reach,   all lines and tubes intact, nursing notified. Patient would benefit from continued skilled Physical Therapy to improve functional independence and quality of life. Patient's/ family goals   home      Patient and or family understand(s) diagnosis, prognosis, and plan of care. Frequency of treatments: Patient will be seen  twice daily  for therapeutic exercise, functional retraining, endurance activities, balance exercises, family and patient education. Time in  1:43  Time out 2:06    Total Treatment Time  23 minutes    \  CPT codes:Therapeutic activities (81209)   23 minutes  2 unit(s)   Jasmyn Turcios  [facility]  LIC # [id number]

## 2021-10-13 NOTE — PROGRESS NOTES
CLINICAL PHARMACY NOTE: MEDS TO BEDS    Total # of Prescriptions Filled: 1   The following medications were delivered to the patient:  · Oxycodone 5mg    Additional Documentation:

## 2021-10-13 NOTE — PROGRESS NOTES
OT BEDSIDE TREATMENT NOTE      Date:10/13/2021  Patient Name: Saima Ellsworth  MRN: [id number]  : 1950  Room: [address] [place] [address]       Evaluating OT: Denisse Mcdonald OTR/L; [id number]      Referring Provider and Specific Provider Orders/Date:      10/12/21 1045   OT eval and treat Start: 10/12/21 1045, End: 10/12/21 1045, ONE TIME, Standing Count: 1 Occurrences, R      DONYA Hathaway,       Placement Recommendation: HHOT        Diagnosis:   1. Primary osteoarthritis of left knee         Surgery: L TKA        Pertinent Medical History:       Past Medical History        Past Medical History:   Diagnosis Date    Arthritis      Diabetes mellitus ([facility] [address]      Hyperlipidemia      Hypertension      PONV (postoperative nausea and vomiting)      Spinal headache      Thyroid disease              Past Surgical History         Past Surgical History:   Procedure Laterality Date    BLADDER SUSPENSION       COLONOSCOPY        FOOT SURGERY Bilateral       bunions, hammer toes      FOOT SURGERY Right 2021     CORRECTION ANGULAR DEFORMITY RIGHT 5TH DIGIT, PARTIAL EXCISION OF BONE RIGHT 5TH DIGIT (CPT 89078) performed by Agapito Willard DPM at [address] [facility]   1999    JOINT REPLACEMENT Right 2018     done in [place]   knee          Precautions:  Fall Risk, full weight bearing: L LE d/t TKA      Assessment of current deficits    [x]? Functional mobility            [x]?ADLs           [x]? Strength                   []?Cognition    [x]? Functional transfers          [x]? IADLs         []? Safety Awareness   [x]? Endurance    []? Fine Coordination              [x]? Balance      []? Vision/perception    []? Sensation      []? Gross Motor Coordination  [x]? ROM           []?  Delirium                   []? Motor Control      OT PLAN OF CARE   OT POC based on physician orders, patient diagnosis and results of clinical assessment     Frequency/Duration 1-3 days/wk for 2 weeks PRN      Specific OT Treatment Interventions to include:   * Instruction/training on adapted ADL techniques and AE recommendations to increase functional independence within precautions       * Training on energy conservation strategies, correct breathing pattern and techniques to improve independence/tolerance for self-care routine  * Functional transfer/mobility training/DME recommendations for increased independence, safety, and fall prevention  * Patient/Family education to increase follow through with safety techniques and functional independence  * Recommendation of environmental modifications for increased safety with functional transfers/mobility and ADLs  * Therapeutic exercise to improve motor endurance, ROM, and functional strength for ADLs/functional transfers  * Therapeutic activities to facilitate/challenge dynamic balance, stand tolerance for increased safety and independence with ADLs  * Positioning to improve skin integrity, interaction with environment and functional independence     Recommended Adaptive Equipment: none       Home Living: with significant other; single family home, 1 story, 3-4 steps to enter with rail, walk in shower.        Equipment owned: wheeled walker, cane, bedside commode, built in shower chair, grab bars      Prior Level of Function: Independent with ADLs , Independent with IADLs; ambulated with no device     Driving: yes  Occupation: retired      Pain Level: 5/10 pain in L knee; Nursing aware and provided pt with pain medication prior to session         Cognition: A&O: 4/4; Follows 3 step directions              Memory: good               Sequencing: good               Problem solving: good               Judgement/safety: good      [facility]   AM-PAC Daily Activity Inpatient   How much help for putting on and taking off regular lower body clothing?: A Little  How much help for Bathing?: A Little  How much help for Toileting?: A Little  How much help for putting on and taking off regular upper body clothing?: A Little  How much help for taking care of personal grooming?: A Little  How much help for eating meals?: None  AM-PAC Inpatient Daily Activity Raw Score: 19                Functional Assessment:     Initial Eval Status  Date: 10/12/21 Treatment Status  Date:10/13/2021 STGs = LTGs  Time frame: 10-14 days   Feeding Independent  Pt able to bring cup to mouth independently Independent    Grooming Minimal Assist   Min A when standing sinkside in clinic to simulate grooming tasks; cuing for sequencing when stepping backward from sink; pt able to wash face when seated in chair, as well as don chapstick Independent    UB Dressing Minimal Assist   Min A to untie back of gown; pt able to don bra and shirt when seated in [address]    LB Dressing Dependent  Pt able to doff incontinence garment with min A for standing balance; pt able to don underwear and pants when seated with pt able to follow correct LE dressing technique; min A for balance as pt stood to don garments over B hips; use of FWW; mod A to don DINORAH hose with use of easy slide; good understanding and pt able to don DINORAH hose from mid calf to knee  Supervision    Bathing Moderate Assist  Min A; Pt education, instruction, demonstration and participation with use of DME in clinic for bathroom safety/safe transfers. Min  Assist for balance as pt stepped  to/from simulated shower. Modified [place]    Toileting Supervision for hygiene after using bedside commode  Transfer to/from [facility] over toilet in clinic with cuing for hand placement, joint protection, walker safety; supervision  Independent    Bed Mobility  Supine to sit: Minimal Assist   Sit to supine: N/T   Pt seated in chair on arrival; sit to supine at mod I with pt able to support LLE to EOB using R LE and B UE's ; SCD's donned; ice provided  Supine to sit:  Independent   Sit to supine: Independent    Functional Transfers Maximal assist x 1 with wheeled walker upon 1st attempt to stand, buckling of L LE so pt was returned to edge of bed. Maximal assist x 2 with wheeled walker upon 2nd stand, buckling. Maximal assist x 2 with hand held assist x 2 and assistance to block B LE in extension d/t buckling, to and from bedside commode.      Transfer training with verbal cues for hand placement throughout session to improve safety. Min A for sit to stand transfers to/from chair during LE dressing; transfer to/from w/c and multiple surfaces in clinic with use of FWW ; cuing for hand placement, walker safety, sequencing, upright position; simulated car transfer at [facility] A to support LLE to/from car d/t decreased strength/ROM in LLE at this time ; transfer from w/c to EOB at min A with FWW Independent    Functional Mobility Maximal assist x 2 with hand held assist x 2 to improve balance, verbal cues for walker sequence and safety. Min A with FWW for household distances in room and clinic; no LOB noted; cuing for sequencing, with pt able to self correct   Modified [place]    Balance Sitting:     Static: good     Dynamic: fair   Standing: poor with hand held assist x 2  Sitting:     Static: good     Dynamic: fair   Standing: fair with min A progressing to supervision with FWW       Activity Tolerance fair  Fair  good    Visual/  Perceptual Glasses: no                        Hand Dominance: right        AROM (PROM) Strength Additional Info:    RUE  WFL 4/5 good  and wfl FMC/dexterity noted during ADL tasks      LUE WFL 4/5 good  and wfl FMC/dexterity noted during ADL tasks         Hearing: WFL   Sensation:  No c/o numbness or tingling  Tone: WFL  Edema: yes, surgical extremity, DINORAH hose donned; ice provided      Comments: Patient cleared by nursing staff. Upon arrival pt seated in chair. Pt agreeable to OT tx session.   Pt educated with regards to bed mobility, hand placement, safety awareness, static sitting balance,  standing balance, transfer training, functional mobility,  grooming tasks, bathroom safety, DME, car, shower and toilet transfers, LE/UE dressing,  ECT's. At end of session pt supine in bed with positioning and ice provided  with call light within reach. Overall, pt demonstrated fair independence and safety during completion of ADL/functional transfers/mobility tasks. Pt would benefit from continued skilled OT to increase safety and independence with completion of ADL/IADL tasks for functional independence and quality of life. Pt required cues and education as noted above for safe facilitation and completion of tasks. Therapist provided skilled monitoring of patient's response during treatment session. Prior to and at the end of session, environmental modifications  completed for patients safety and efficiency of treatment session. Overall, patient demonstrates minimal difficulties with completion of BADLs and IADLs. Factors contributing to these difficulties include pain, decreased endurance, and generalized weakness. As noted above, patient likely to benefit from further OT intervention to increase independence, safety, and overall quality of life. Treatment:     ? Bed mobility: Facilitated bed mobility with cues for proper body mechanics and sequencing to prepare for ADL completion. ? Functional transfers: Facilitated transfers from various surfaces with cues for body alignment, safety and hand placement. ? ADL completion: Self-care retraining for the above-mentioned ADLs; training on proper hand placement, safety technique, sequencing, and energy conservation techniques. ? Postural Balance: Sitting/standing balance retraining to improve righting reactions with postural changes during ADLs. ? Skilled positioning: Proper positioning to improve interaction with environment, overall functioning and decrease/prevent edema and contractures.     · Pt has made fair progress towards set goals    · OT 1-3x/week for 5-7 days during hospitalization     Treatment Time also includes thorough review of current medical information, gathering information on past medical history/social history and prior level of function, informal observation of tasks, assessment of data and education on plan of care and goals.     Treatment Time In: 9:21 AM     Treatment Time Out: 10:15 AM            Treatment Charges: Mins Units   ADL/Home Mgt     [id number] 23 2   Thera Activities     [id number] 31 2   Ther Ex                 [id number]     Manual Therapy    [id number]     Neuro Re-ed         51396     Orthotic manage/training                               [id number]     Non Billable Time     Total Timed Treatment 54 [id number], GIRALDO/L #[id number]

## 2021-10-13 NOTE — PROGRESS NOTES
Physical Therapy Treatment Note/Plan of Care    Room #:  0317/0317-01  Patient Name: Sampson Holcomb  YOB: 1950  MRN: [id number]    Date of Service: 10/13/2021     Tentative placement recommendation: [address] [facility] PT 5 days a week   Equipment recommendation: Patient has needed equipment       Evaluating Physical Therapist: Eliana Sterling #[id number]     Specific Provider Orders/Date/Referring Provider :  10/12/21 1045   PT eval and treat Start: 10/12/21 1045, End: 10/12/21 1045, ONE TIME, Standing Count: 1 Occurrences, R    K Yady Port, DO      Admitting Diagnosis:   Primary osteoarthritis of left knee [M17.12]  Arthritis of left knee [M17.12]   Visit diagnosis:   Visit Diagnoses       Codes    Primary osteoarthritis of left knee    -  Primary M17.12        Surgery:   DATE OF PROCEDURE: 10/12/2021   SURGEON: Anne-Marie Mcqueen    OPERATION: Left total knee replacement arthroplasty    Patient Active Problem List   Diagnosis    Arthritis of left knee    Hypertension    Hyperlipidemia    Thyroid disease    Diabetes mellitus ([facility] [address]       ASSESSMENT of current deficits: Patient exhibits decreased strength, balance, endurance, range of motion and pain left knee impairing functional mobility, transfers, gait , gait distance and tolerance to activity no left knee buckling this session, however cues needed to extend left knee in stance phase of gait. Increased gait distance with slow pacing using correct sequencing however max cues for walker approximation. Pain in back of knee limiting increased functional mobility and safety. Patient requires skilled physical therapy to address concerns listed above to increase safety and independence at discharge.          PHYSICAL THERAPY  PLAN OF CARE       Physical therapy plan of care is established based on physician order,  patient diagnosis and clinical assessment    Current Treatment Recommendations:    -Bed Mobility: Lower extremity exercises   -Sitting Balance: Incorporate reaching activities to activate trunk muscles , Facilitate active trunk muscle engagement  and Facilitate postural control in all planes   -Standing Balance: Perform strengthening exercises in standing to promote motor control with or without upper extremity support   -Transfers: Provide instruction on proper hand and foot position for adequate transfer of weight onto lower extremities and use of gait device if needed and Cues for hand placement, technique and safety. Provide stabilization to prevent fall   -Gait: Gait training, Standing activities to improve: base of support, weight shift, weight bearing  and Pregait training to emphasize: Sequencing , Weight shift, Device control, left knee extension in stance phase of gait and Safety   -Endurance: Utilize Supervised activities to increase level of endurance to allow for safe functional mobility including transfers and gait   -Stairs: Stair training with instruction on proper technique and hand placement on rail    PT long term treatment goals are located in below grid    Patient and or family understand(s) diagnosis, prognosis, and plan of care. Frequency of treatments: Patient will be seen  twice daily  for therapeutic exercise, functional retraining, endurance activities, balance exercises, family and patient education.        Prior Level of Function: Patient ambulated independently    Rehab Potential: good  - for baseline    Past medical history:   Past Medical History:   Diagnosis Date    Arthritis     Diabetes mellitus ([facility] [address]     Hyperlipidemia     Hypertension     PONV (postoperative nausea and vomiting)     Spinal headache     Thyroid disease      Past Surgical History:   Procedure Laterality Date    BLADDER SUSPENSION  2016    COLONOSCOPY      FOOT SURGERY Bilateral     bunions, hammer toes      FOOT SURGERY Right 8/17/2021    CORRECTION ANGULAR DEFORMITY RIGHT 5TH DIGIT, PARTIAL EXCISION OF BONE RIGHT 5TH DIGIT (CPT 01607) performed by Suman Littlejohn DPM at [address]  02/1999    JOINT REPLACEMENT Right 05/2018    done in [place]   knee    TOTAL KNEE ARTHROPLASTY Left 10/12/2021    TOTAL LEFT KNEE REPLACEMENT ARTHROPLASTY (CE) performed by Lynette Reid DO at [address]:    Precautions:   , falls ,left lower extremity FWB (full weight bearing) urinary incontinence    Social history: Patient lives with significant other, Kellie Sharma in a ranch home  with 3-4 steps  to enter bilateral Rail  Walk in shower   built in [facility] owned: [employer], [address] and Bedside commode,       [address]   How much difficulty turning over in bed?: A Little  How much difficulty sitting down on / standing up from a chair with arms?: A Little  How much difficulty moving from lying on back to sitting on side of bed?: A Little  How much help from another person moving to and from a bed to a chair?: A Little  How much help from another person needed to walk in hospital room?: A Little  How much help from another person for climbing 3-5 steps with a railing?: A Little  AM-PAC Inpatient Mobility Raw Score : 18  AM-PAC Inpatient T-Scale Score : 43.63  Mobility Inpatient CMS 0-100% Score: 46.58  Mobility Inpatient CMS G-Code Modifier : CK    Nursing cleared patient for PT treatment. Patient states that her left lower is in pain especially in the back of her knee. Patient states that her left leg still feels numb. OBJECTIVE:   Initial Evaluation  Date: 10/12/2021 Treatment Date:  10/13/2021   Short Term/ Long Term   Goals   Was pt agreeable to Eval/treatment? Yes Yes     Pain Level  5/10   Left knee   6/10 initially then 7-8/10 after ambulation and steps.       Bed Mobility  Rolling: Not assessed     Supine to sit: Not assessed     Sit to supine: Minimal assist of 1    Scooting: Minimal assist of 1   Rolling: Supervision    Supine to sit: Minimal assist of 1 for LLE  Sit to supine: Not assessed patient in chair   Scooting: Supervision     Rolling: Independent    Supine to sit: Independent    Sit to supine: Independent    Scooting: Independent     Transfers Sit to stand: Moderate assist of  2 left knee buckling requiring knee block assist of 2 for safety Sit to stand: Minimal assist of 1 then progressed to supervision at times cues for hand/foot placement and safety.      Sit to stand: Modified Independent     Ambulation    2 x 3 feet using  hand held assist with Maximal assist of  2   Patient with flexed posture and left knee buckling and cues for sequencing, upright posture, left knee extension in stance phase of gait and safety 2x10 feet using  wheeled walker with Minimal assist of 1   Patient with flexed posture and cues for sequencing, upright posture, walker approximation, left knee extension in stance phase of gait and safety   100 feet using  wheeled walker with Modified Independent    Stair negotiation: ascended and descended   Not assessed    6 steps ascend/descend with bilateral rails needing minimal assist     4 steps 1 rail with supervision    ROM Within functional limits except Left knee 5-80°     Increase range of motion 10% of affected joints    Strength Within functional limits  except  Left lower extremity 3-/5  Within functional limits   Balance Sitting EOB:  fair  +  Dynamic Standing:  poor left knee buckling requiring blocking wheeled walker  Sitting EOB: fair+   Dynamic Standing: fairWW    Sitting EOB:  good    Dynamic Standing:  good wheeled walker      Patient is Alert & Oriented x person, place, time and situation and follows directions    Sensation:  Patient reports numbness/tingling left lower extremity    Edema:  yes left lower extremity   Vitals: room air  Blood Pressure at rest   Blood Pressure during session     Heart Rate at rest  Heart Rate during session     SPO2 at rest %  SPO2 during session  %     Patient education  Patient educated on

## 2021-10-13 NOTE — PROGRESS NOTES
Department of Internal Medicine        HISTORY OF PRESENT ILLNESS:      The patient is a 66 y.o. female who presents with having elective left TKA performed secondary to chronic left knee discomfort. Patient is seen postop. Patient has expected postop discomfort. Patient has had multiple episodes of nausea/vomiting since surgery. She denies any coffee-ground or hematemesis. There is no associated abdominal pain with it. He denies any problem chest pain, dizziness or unusual shortness of breath. The patient said the Zofran did help some but did not totally relieve it.    10/13/2021  Patient seen examined on medical surgical floor. Patient denies any more nausea or vomiting. Patient does have expected postop discomfort. She denies any problem chest pain, abdominal pain, dizziness or unusual shortness of breath. BUN/creatinine was 19/0.7 with blood sugars ranging 142211. Hemoglobin 11.2. Temperature is 90.1 with heart rate of 73 and blood pressure 131/62. O2 sat 94% on room air at rest.    Past Medical History:    Past Medical History:   Diagnosis Date    Arthritis     Diabetes mellitus ([facility] [address]     Hyperlipidemia     Hypertension     PONV (postoperative nausea and vomiting)     Spinal headache     Thyroid disease      Past Surgical History:    Past Surgical History:   Procedure Laterality Date    BLADDER SUSPENSION  2016    COLONOSCOPY      FOOT SURGERY Bilateral     bunions, hammer toes      FOOT SURGERY Right 8/17/2021    CORRECTION ANGULAR DEFORMITY RIGHT 5TH DIGIT, PARTIAL EXCISION OF BONE RIGHT 5TH DIGIT (CPT 60402) performed by Austin Perry DPM at [address]  02/1999    JOINT REPLACEMENT Right 05/2018    done in [place]   knee       Medications Prior to Admission:    @  Prior to Admission medications    Medication Sig Start Date End Date Taking?  Authorizing Provider   aspirin 325 MG EC tablet Take 1 tablet by mouth daily 10/13/21 10/13/22 Yes Merlyn Blount, DO oxyCODONE (ROXICODONE) 5 MG immediate release tablet Take 1 tablet by mouth every 6 hours as needed for Pain for up to 7 days. Intended supply: 7 days. Take lowest dose possible to manage pain 10/13/21 10/20/21 Yes Alexandre Navarro,    metoprolol tartrate (LOPRESSOR) 25 MG tablet 25 mg 2 times daily  20  Yes Historical Provider, MD   amLODIPine (NORVASC) 10 MG tablet 5 mg daily  20  Yes Historical Provider, MD   levothyroxine (SYNTHROID) 100 MCG tablet 75 mcg Daily  20  Yes Historical Provider, MD   atorvastatin (LIPITOR) 20 MG tablet 20 mg daily  20   Historical Provider, MD   losartan (COZAAR) 50 MG tablet 50 mg daily  20   Historical Provider, MD   metFORMIN (GLUCOPHAGE) 500 MG tablet 2 times daily (with meals)  20   Historical Provider, MD   alendronate (FOSAMAX) 70 MG tablet 70 mg every 7 days  20   Historical Provider, MD   SUMAtriptan (IMITREX) 50 MG tablet once as needed  6/10/20   Historical Provider, MD       Allergies:  Penicillins and Sulfa antibiotics    Social History:   Social History     Socioeconomic History    Marital status:      Spouse name: Not on file    Number of children: Not on file    Years of education: Not on file    Highest education level: Not on file   Occupational History    Not on file   Tobacco Use    Smoking status: Former Smoker     Quit date:      Years since quittin.8    Smokeless tobacco: Never Used   Substance and Sexual Activity    Alcohol use: Never    Drug use: Never    Sexual activity: Not on file   Other Topics Concern    Not on file   Social History Narrative    Not on file     Social Determinants of Health     Financial Resource Strain:     Difficulty of Paying Living Expenses:    Food Insecurity:     Worried About [address] in the Last Year:     [address] in the Last Year:    Transportation Needs:     Lack of Transportation (Medical):      Lack of Transportation (Non-Medical): Physical Activity:     Days of Exercise per Week:     Minutes of Exercise per Session:    Stress:     Feeling of Stress :    Social Connections:     Frequency of Communication with Friends and Family:     Frequency of Social Gatherings with Friends and Family:     Attends Congregational Services:     Active Member of Clubs or Organizations:     Attends Club or Organization Meetings:     Marital Status:    Intimate Partner Violence:     Fear of Current or Ex-Partner:     Emotionally Abused:     Physically Abused:     Sexually Abused:        Family History:   History reviewed. No pertinent family history. REVIEW OF SYSTEMS:    Gen: Patient denies any lightheadedness or dizziness. No LOC or syncope. No fevers or chills. HEENT: No earache, sore throat or nasal congestion. Resp: Denies cough, hemoptysis or sputum production. Cardiac: Denies chest pain, SOB, diaphoresis or palpitations. GI: No nausea, vomiting, diarrhea or constipation. No melena or hematochezia. : No urinary complaints, dysuria, hematuria or frequency. MSK: + Left knee pain. No extremity weakness, paralysis or paresthesias. PHYSICAL EXAM:    Vitals:  /62   Pulse 73   Temp 98.1 °F (36.7 °C) (Oral)   Resp 18   Ht 5' 6\" (1.676 m)   Wt 170 lb (77.1 kg)   SpO2 94%   BMI 27.44 kg/m²     General:  This is a 66 y.o. yo female who is alert and oriented in moderate postop discomfort. HEENT:  Head is normocephalic and atraumatic, PERRLA, EOMI, mucus membranes moist with no pharyngeal erythema or exudate. Neck:  Supple with no carotid bruits, JVD or thyromegaly.   No cervical adenopathy  CV:  Regular rate and rhythm, no murmurs  Lungs:  Clear to auscultation bilaterally with no wheezes, rales or rhonchi  Abdomen:  Soft, nontender,  mildly distended, bowel sounds present  Extremities:  + Left knee edema postop, peripheral pulses intact bilaterally  Neuro:  Cranial nerves II-XII grossly intact; motor and sensory function intact with no focal deficits  Skin:  No rashes, lesions or wounds      DATA:  CBC with Differential:    Lab Results   Component Value Date    WBC 6.4 10/04/2021    RBC 4.34 10/04/2021    HGB 11.2 10/13/2021    HCT 34.1 10/13/2021     10/04/2021    MCV 95.9 10/04/2021    MCH 31.8 10/04/2021    MCHC 33.2 10/04/2021    RDW 12.4 10/04/2021    LYMPHOPCT 15.3 10/04/2021    MONOPCT 9.1 10/04/2021    BASOPCT 0.9 10/04/2021    MONOSABS 0.58 10/04/2021    LYMPHSABS 0.97 10/04/2021    EOSABS 0.38 10/04/2021    BASOSABS 0.06 10/04/2021     CMP:    Lab Results   Component Value Date     10/13/2021    K 4.3 10/13/2021    K 4.0 10/04/2021     10/13/2021    CO2 21 10/13/2021    BUN 19 10/13/2021    CREATININE 0.7 10/13/2021    GFRAA >60 10/13/2021    LABGLOM >60 10/13/2021    GLUCOSE 162 10/13/2021    PROT 7.6 10/04/2021    LABALBU 4.0 10/04/2021    CALCIUM 8.0 10/13/2021    BILITOT 0.4 10/04/2021    ALKPHOS 170 10/04/2021    AST 27 10/04/2021    ALT 27 10/04/2021     Magnesium:  No results found for: MG  Phosphorus:  No results found for: PHOS  PT/INR:    Lab Results   Component Value Date    PROTIME 10.8 10/04/2021    INR 0.9 10/04/2021     Troponin:  No results found for: TROPONINI  U/A:    Lab Results   Component Value Date    COLORU Yellow 10/04/2021    PROTEINU Negative 10/04/2021    PHUR 6.0 10/04/2021    WBCUA 10-20 10/04/2021    RBCUA NONE 10/04/2021    BACTERIA MANY 10/04/2021    CLARITYU Clear 10/04/2021    SPECGRAV 1.010 10/04/2021    LEUKOCYTESUR SMALL 10/04/2021    UROBILINOGEN 0.2 10/04/2021    BILIRUBINUR Negative 10/04/2021    BLOODU Negative 10/04/2021    GLUCOSEU Negative 10/04/2021     ABG:  No results found for: PH, PCO2, PO2, HCO3, BE, THGB, TCO2, O2SAT  HgBA1c:  No results found for: LABA1C  FLP:  No results found for: TRIG, HDL, LDLCALC, LDLDIRECT, LABVLDL  TSH:  No results found for: TSH  IRON:  No results found for: IRON  LIPASE:  No results found for: LIPASE    ASSESSMENT AND PLAN:      Patient Active Problem List    Diagnosis Date Noted    Arthritis of left knee-status post left TKA 10/12 10/12/2021    Hypertension     Hyperlipidemia     Thyroid disease      Noninsulin-dependent diabetes mellitus ([facility] [address]  + Nausea/vomiting postop      Plan:  Patient admitted to medical surgical floor  Home medications reviewed  Monitor heart rate, blood pressure, O2 saturations  Glucoscans 4 times daily with sliding scale insulin  Phenergan 25 mg IM every 6.     Discharge home when okay with orthopedics  Resume same home luis daniels    Roberth Creon DO, D.O.  10/13/2021  9:09 AM

## 2021-10-13 NOTE — PROGRESS NOTES
Department of Orthopedic Surgery  Resident Progress Note    Patient seen and examined. Pain controlled. No new complaints. Denies chest pain, shortness of breath, dizziness/lightheadedness. No Flatulence, no BM, urinating well. Only took a few steps with therapy yesterday but has been up since to use the restroom. Main issue is buckling of her knee at this point which should improve with therapy. VITALS:  /62   Pulse 73   Temp 98.1 °F (36.7 °C) (Oral)   Resp 18   Ht 5' 6\" (1.676 m)   Wt 170 lb (77.1 kg)   SpO2 94%   BMI 27.44 kg/m²     General: alert and oriented to person, place and time, well-developed and well-nourished, in no acute distress    MUSCULOSKELETAL:   left lower extremity:  · Dressing C/D/I - mild saturation to aquacel  · Calf non tender  · No pain with passive dorsiflexion of ankle/foot  · Compartments soft and compressible  · +PF/DF/EHL  · +2/4 DP & PT pulses, Brisk Cap refill, Toes warm and perfused  · Distal sensation grossly intact to Peroneals, Sural, Saphenous, and tibial nrs    CBC:   Lab Results   Component Value Date    WBC 6.4 10/04/2021    HGB 11.2 10/13/2021    HCT 34.1 10/13/2021     10/04/2021     PT/INR:    Lab Results   Component Value Date    PROTIME 10.8 10/04/2021    INR 0.9 10/04/2021       ASSESSMENT  · S/P Left TKA - 10/12/2021    PLAN      · Continue physical therapy and protocol: WBAT - LLE  · 24 hour abx coverage  · Deep venous thrombosis prophylaxis - Aspirin 325 mg BID, early mobilization  · PT/OT  · Pain Control: IV and PO  · Monitor H&H 11.2  · D/C Plan:  Pending how she does with therapy.   She will likely require another night in hospital as she has not ambulated much with therapy

## 2021-10-13 NOTE — PROGRESS NOTES
OT BEDSIDE TREATMENT NOTE      Date:10/13/2021  Patient Name: Silver Holt  MRN: [id number]  : 1950  Room: [address] [place] [address]       Evaluating OT: Catrachito Garay OTR/L; [id number]      Referring Provider and Specific Provider Orders/Date:      10/12/21 1045   OT eval and treat Start: 10/12/21 1045, End: 10/12/21 1045, ONE TIME, Standing Count: 1 Occurrences, R      K Vincenzo Shepard,       Placement Recommendation: HHOT        Diagnosis:   1. Primary osteoarthritis of left knee         Surgery: L TKA        Pertinent Medical History:       Past Medical History        Past Medical History:   Diagnosis Date    Arthritis      Diabetes mellitus ([address]      Hyperlipidemia      Hypertension      PONV (postoperative nausea and vomiting)      Spinal headache      Thyroid disease              Past Surgical History         Past Surgical History:   Procedure Laterality Date    BLADDER SUSPENSION       COLONOSCOPY        FOOT SURGERY Bilateral       bunions, hammer toes      FOOT SURGERY Right 2021     CORRECTION ANGULAR DEFORMITY RIGHT 5TH DIGIT, PARTIAL EXCISION OF BONE RIGHT 5TH DIGIT (CPT 11681) performed by Preethi Perdomo DPM at [address] [facility]   1999    JOINT REPLACEMENT Right 2018     done in [place]   knee          Precautions:  Fall Risk, full weight bearing: L LE d/t TKA      Assessment of current deficits    [x]? Functional mobility            [x]?ADLs           [x]? Strength                   []?Cognition    [x]? Functional transfers          [x]? IADLs         []? Safety Awareness   [x]? Endurance    []? Fine Coordination              [x]? Balance      []? Vision/perception    []? Sensation      []? Gross Motor Coordination  [x]? ROM           []?  Delirium                   []? Motor Control      OT PLAN OF CARE   OT POC based on physician orders, patient diagnosis and results of clinical assessment     Frequency/Duration 1-3 days/wk for 2 weeks PRN      Specific OT Treatment Interventions to include:   * Instruction/training on adapted ADL techniques and AE recommendations to increase functional independence within precautions       * Training on energy conservation strategies, correct breathing pattern and techniques to improve independence/tolerance for self-care routine  * Functional transfer/mobility training/DME recommendations for increased independence, safety, and fall prevention  * Patient/Family education to increase follow through with safety techniques and functional independence  * Recommendation of environmental modifications for increased safety with functional transfers/mobility and ADLs  * Therapeutic exercise to improve motor endurance, ROM, and functional strength for ADLs/functional transfers  * Therapeutic activities to facilitate/challenge dynamic balance, stand tolerance for increased safety and independence with ADLs  * Positioning to improve skin integrity, interaction with environment and functional independence     Recommended Adaptive Equipment: none       Home Living: with significant other; single family home, 1 story, 3-4 steps to enter with rail, walk in shower.        Equipment owned: wheeled walker, cane, bedside commode, built in shower chair, grab bars      Prior Level of Function: Independent with ADLs , Independent with IADLs; ambulated with no device     Driving: yes  Occupation: retired      Pain Level: 5/10 pain in L knee; Nursing notified, as pt requesting another pain pill prior to D/C        Cognition: A&O: 4/4; Follows 3 step directions              Memory: good               Sequencing: good               Problem solving: good               Judgement/safety: good      [facility]   AM-PAC Daily Activity Inpatient   How much help for putting on and taking off regular lower body clothing?: A Little  How much help for Bathing?: A Little  How much help for Toileting?: A Little  How much help for putting on and taking off regular upper body clothing?: A Little  How much help for taking care of personal grooming?: A Little  How much help for eating meals?: None  AM-PAC Inpatient Daily Activity Raw Score: 19                Functional Assessment:     Initial Eval Status  Date: 10/12/21 Treatment Status  Date:10/13/2021 STGs = LTGs  Time frame: 10-14 days   Feeding Independent  N/T Independent    Grooming Minimal Assist   Min A when standing sinkside in clinic to simulate grooming tasks; cuing for sequencing when stepping backward from sink Independent    UB Dressing Minimal Assist   N/T; pt donned shirt at last session   Independent    LB Dressing Dependent  N/T- pt had donned pants at last session Supervision    Bathing Moderate Assist  Min A; Pt education, instruction, demonstration and participation with use of DME in clinic for bathroom safety/safe transfers. Min  Assist for balance as pt stepped  to/from simulated shower. Modified [place]    Toileting Supervision for hygiene after using bedside commode  Transfer to/from [facility] over toilet in clinic with cuing for hand placement, joint protection, walker safety; supervision  Independent    Bed Mobility  Supine to sit: Minimal Assist   Sit to supine: N/T   Pt seated in chair on arrival; sit to supine N/T as pt seated in chair at end of session Supine to sit: Independent   Sit to supine: Independent    Functional Transfers Maximal assist x 1 with wheeled walker upon 1st attempt to stand, buckling of L LE so pt was returned to edge of bed. Maximal assist x 2 with wheeled walker upon 2nd stand, buckling. Maximal assist x 2 with hand held assist x 2 and assistance to block B LE in extension d/t buckling, to and from bedside commode.      Transfer training with verbal cues for hand placement throughout session to improve safety.   Min A for sit to stand transfers to/from chair ; transfer to/from w/c and multiple surfaces in clinic with use of FWW ; cuing for hand placement, walker safety, above for safe facilitation and completion of tasks. Therapist provided skilled monitoring of patient's response during treatment session. Prior to and at the end of session, environmental modifications  completed for patients safety and efficiency of treatment session. Overall, patient demonstrates minimal difficulties with completion of BADLs and IADLs. Factors contributing to these difficulties include pain, decreased endurance, and generalized weakness. As noted above, patient likely to benefit from further OT intervention to increase independence, safety, and overall quality of life. Treatment:     ? Functional transfers: Facilitated transfers from various surfaces with cues for body alignment, safety and hand placement. ? Postural Balance: Sitting/standing balance retraining to improve righting reactions with postural changes during ADLs. · Pt has made fair progress towards set goals    · OT 1-3x/week for 5-7 days during hospitalization     Treatment Time also includes thorough review of current medical information, gathering information on past medical history/social history and prior level of function, informal observation of tasks, assessment of data and education on plan of care and goals.     Treatment Time In: 1:20 PM    Treatment Time Out: 1:48 PM        Treatment Charges: Mins Units   ADL/Home Mgt     [id number]     Thera Activities     [id number]   Ther Ex                 [id number]     Manual Therapy    [id number]     Neuro Re-ed         [id number]     Orthotic manage/training                               [id number]     Non Billable Time     Total Timed Treatment 28 [address] [facility], GIRALDO/L #[id number]

## 2021-10-13 NOTE — PLAN OF CARE
Problem: Falls - Risk of:  Goal: Will remain free from falls  Description: Will remain free from falls  10/13/2021 0017 by Adia Womack RN  Outcome: Met This Shift  10/12/2021 1543 by Tati Gamble RN  Outcome: Met This Shift  Goal: Absence of physical injury  Description: Absence of physical injury  10/13/2021 0017 by Adia Womack RN  Outcome: Met This Shift  10/12/2021 1543 by Tati Gamble RN  Outcome: Met This Shift     Problem: Venous Thromboembolism:  Goal: Will show no signs or symptoms of venous thromboembolism  Description: Will show no signs or symptoms of venous thromboembolism  10/13/2021 0017 by Adia Womack RN  Outcome: Met This Shift  10/12/2021 1543 by Tati Gamble RN  Outcome: Met This Shift  Goal: Absence of signs or symptoms of impaired coagulation  Description: Absence of signs or symptoms of impaired coagulation  10/13/2021 0017 by Adia Womack RN  Outcome: Met This Shift  10/12/2021 1543 by Tati Gamble RN  Outcome: Met This Shift

## 2021-10-15 ENCOUNTER — TELEPHONE (OUTPATIENT)
Dept: ORTHOPEDIC SURGERY | Age: 71
End: 2021-10-15

## 2021-10-15 NOTE — TELEPHONE ENCOUNTER
Incision is warm and has 60/65 percent of discharge on the aquacel but the pad is still intact. Had a fever 103 pt refused to go to er but did take tylenol and it came down and no fever today. I told her if it gets worse any signs of infection to go get it checked and to call us Monday. Ph [phone] [facility].

## 2021-11-03 ENCOUNTER — OFFICE VISIT (OUTPATIENT)
Dept: ORTHOPEDIC SURGERY | Age: 71
End: 2021-11-03

## 2021-11-03 VITALS — HEIGHT: 66 IN | BODY MASS INDEX: 27.32 KG/M2 | WEIGHT: 170 LBS | TEMPERATURE: 98 F

## 2021-11-03 DIAGNOSIS — Z96.652 S/P TKR (TOTAL KNEE REPLACEMENT) USING CEMENT, LEFT: Primary | ICD-10-CM

## 2021-11-03 PROCEDURE — 99024 POSTOP FOLLOW-UP VISIT: CPT | Performed by: ORTHOPAEDIC SURGERY

## 2021-11-03 RX ORDER — LEVOTHYROXINE SODIUM 0.07 MG/1
75 TABLET ORAL DAILY
COMMUNITY
Start: 2021-09-22

## 2021-11-03 RX ORDER — AMLODIPINE BESYLATE 5 MG/1
5 TABLET ORAL DAILY
COMMUNITY
Start: 2021-11-02

## 2021-11-03 RX ORDER — METOPROLOL SUCCINATE 25 MG/1
25 TABLET, EXTENDED RELEASE ORAL 2 TIMES DAILY
COMMUNITY
Start: 2021-09-22

## 2021-11-03 NOTE — PROGRESS NOTES
Chief Complaint:   Chief Complaint   Patient presents with    Post-Op Check     left knee TKA DOS 10/12/21. Yanira Holland is 22 days postop left total knee replacement arthroplasty. She is doing pretty well. She is not taking any pain pills. She would like to take some Aleve and asked if that was okay. She is working on extension and having home therapy now.'s very soon she will transition outpatient. She lives in [facility] and will continue her there. Allergies; medications; past medical, surgical, family, and social history; and problem list have been reviewed today and updated as indicated in this encounter seen below. Exam: The incision is healing well. Alignment is good. Her calf is supple. There is mild effusion. The knee is not hot or red. She is tight in the posterior aspect and does not tend to fully extend her knee but is pliable in flexion and extension. Her flexion is to 90 degrees or more. She has good stability. Radiographs: None    ASSESSMENT:    Lesvia Telles was seen today for post-op check. Diagnoses and all orders for this visit:    S/P TKR (total knee replacement) using cement, left        PLAN: Continue with physical therapy with transition to outpatient therapy. She needs to continue diligent work on her home exercise particularly with regards to extension of her knee. We will follow-up in 3 weeks. Return in about 3 weeks (around 11/24/2021).        Current Outpatient Medications   Medication Sig Dispense Refill    amLODIPine (NORVASC) 5 MG tablet       levothyroxine (SYNTHROID) 75 MCG tablet       metoprolol succinate (TOPROL XL) 25 MG extended release tablet       aspirin 325 MG EC tablet Take 1 tablet by mouth daily 30 tablet 0    atorvastatin (LIPITOR) 20 MG tablet 20 mg daily       metoprolol tartrate (LOPRESSOR) 25 MG tablet 25 mg 2 times daily       losartan (COZAAR) 50 MG tablet 50 mg daily       metFORMIN (GLUCOPHAGE) 500 MG tablet 2 times daily (with meals)  alendronate (FOSAMAX) 70 MG tablet 70 mg every 7 days       SUMAtriptan (IMITREX) 50 MG tablet once as needed        No current facility-administered medications for this visit. Patient Active Problem List   Diagnosis    Arthritis of left knee    Hypertension    Hyperlipidemia    Thyroid disease    Diabetes mellitus ([facility] [address]       Past Medical History:   Diagnosis Date    Arthritis     Diabetes mellitus ([address]     Hyperlipidemia     Hypertension     PONV (postoperative nausea and vomiting)     Spinal headache     Thyroid disease        Past Surgical History:   Procedure Laterality Date    BLADDER SUSPENSION      COLONOSCOPY      FOOT SURGERY Bilateral     bunions, hammer toes      FOOT SURGERY Right 2021    CORRECTION ANGULAR DEFORMITY RIGHT 5TH DIGIT, PARTIAL EXCISION OF BONE RIGHT 5TH DIGIT (CPT 53889) performed by Jean Claude [place] DPM at [address]  1999    JOINT REPLACEMENT Right 2018    [place] in [place]   knee    TOTAL KNEE ARTHROPLASTY Left 10/12/2021    TOTAL LEFT KNEE REPLACEMENT ARTHROPLASTY (CE) performed by Juan Casanova DO at [address] [facility]   Allergen Reactions    Penicillins     Sulfa Antibiotics        Social History     Socioeconomic History    Marital status:       Spouse name: None    Number of children: None    Years of education: None    Highest education level: None   Occupational History    None   Tobacco Use    Smoking status: Former Smoker     Quit date:      Years since quittin.8    Smokeless tobacco: Never Used   Substance and Sexual Activity    Alcohol use: Never    Drug use: Never    Sexual activity: None   Other Topics Concern    None   Social History Narrative    None     Social Determinants of Health     Financial Resource Strain:     Difficulty of Paying Living Expenses:    Food Insecurity:     Worried About Running Out of Food in the Last Year:     [address] in the Last Year:    Transportation Needs:     Lack of Transportation (Medical):  Lack of Transportation (Non-Medical):    Physical Activity:     Days of Exercise per Week:     Minutes of Exercise per Session:    Stress:     Feeling of Stress :    Social Connections:     Frequency of Communication with Friends and Family:     Frequency of Social Gatherings with Friends and Family:     Attends Yazidism Services:     Active Member of Clubs or Organizations:     Attends Club or Organization Meetings:     Marital Status:    Intimate Partner Violence:     Fear of Current or Ex-Partner:     Emotionally Abused:     Physically Abused:     Sexually Abused:        Review of Systems  As follows except as previously noted in HPI:  Constitutional: Negative for chills, diaphoresis, fatigue, fever and unexpected weight change. Respiratory: Negative for cough, shortness of breath and wheezing. Cardiovascular: Negative for chest pain and palpitations. Neurological: Negative for dizziness, syncope, cephalgia. GI / : negative  Musculoskeletal: see HPI       Objective:   Physical Exam   Constitutional: Oriented to person, place, and time. and appears well-developed and well-nourished. :   Head: Normocephalic and atraumatic. Eyes: EOM are normal.   Neck: Neck supple. Cardiovascular: Normal rate and regular rhythm. Pulmonary/Chest: Effort normal. No stridor. No respiratory distress, no wheezes. Abdominal:  No abnormal distension. Neurological: Alert and oriented to person, place, and time. Skin: Skin is warm and dry. Psychiatric: Normal mood and affect.  Behavior is normal. Thought content normal.    DONYA Bojorquez,     11/3/21  11:30 AM

## 2021-12-01 ENCOUNTER — OFFICE VISIT (OUTPATIENT)
Dept: ORTHOPEDIC SURGERY | Age: 71
End: 2021-12-01

## 2021-12-01 VITALS — WEIGHT: 170 LBS | HEIGHT: 66 IN | TEMPERATURE: 98 F | BODY MASS INDEX: 27.32 KG/M2

## 2021-12-01 DIAGNOSIS — Z96.652 S/P TKR (TOTAL KNEE REPLACEMENT) USING CEMENT, LEFT: Primary | ICD-10-CM

## 2021-12-01 PROCEDURE — 99024 POSTOP FOLLOW-UP VISIT: CPT | Performed by: ORTHOPAEDIC SURGERY

## 2021-12-01 NOTE — PROGRESS NOTES
List   Diagnosis    Arthritis of left knee    Hypertension    Hyperlipidemia    Thyroid disease    Diabetes mellitus ([facility] [address]       Past Medical History:   Diagnosis Date    Arthritis     Diabetes mellitus ([address]     Hyperlipidemia     Hypertension     PONV (postoperative nausea and vomiting)     Spinal headache     Thyroid disease        Past Surgical History:   Procedure Laterality Date    BLADDER SUSPENSION  2016    COLONOSCOPY      FOOT SURGERY Bilateral     bunions, hammer toes      FOOT SURGERY Right 2021    CORRECTION ANGULAR DEFORMITY RIGHT 5TH DIGIT, PARTIAL EXCISION OF BONE RIGHT 5TH DIGIT (CPT 52136) performed by William Ricks DPM at [address] [facility]  1999    JOINT REPLACEMENT Right 2018    [place] in [place]   knee    TOTAL KNEE ARTHROPLASTY Left 10/12/2021    TOTAL LEFT KNEE REPLACEMENT ARTHROPLASTY (CE) performed by Sushila Jones DO at [address] [facility]   Allergen Reactions    Penicillins     Sulfa Antibiotics        Social History     Socioeconomic History    Marital status:      Spouse name: None    Number of children: None    Years of education: None    Highest education level: None   Occupational History    None   Tobacco Use    Smoking status: Former Smoker     Quit date:      Years since quittin.9    Smokeless tobacco: Never Used   Substance and Sexual Activity    Alcohol use: Never    Drug use: Never    Sexual activity: None   Other Topics Concern    None   Social History Narrative    None     Social Determinants of Health     Financial Resource Strain:     Difficulty of Paying Living Expenses: Not on file   Food Insecurity:     Worried About Running Out of Food in the Last Year: Not on file    Jaye of Food in the Last Year: Not on file   Transportation Needs:     Lack of Transportation (Medical): Not on file    Lack of Transportation (Non-Medical):  Not on file   Physical Activity:     Days of Exercise per Week: Not on file    Minutes of Exercise per Session: Not on file   Stress:     Feeling of Stress : Not on file   Social Connections:     Frequency of Communication with Friends and Family: Not on file    Frequency of Social Gatherings with Friends and Family: Not on file    Attends Anglican Services: Not on file    Active Member of [address] [place] [address] [employer] or Organizations: Not on file    Attends Club or Organization Meetings: Not on file    Marital Status: Not on file   Intimate Partner Violence:     Fear of Current or Ex-Partner: Not on file    Emotionally Abused: Not on file    Physically Abused: Not on file    Sexually Abused: Not on file   Housing Stability:     Unable to Pay for Housing in the Last Year: Not on file    Number of [place] in the Last Year: Not on file    Unstable Housing in the Last Year: Not on file       Review of Systems  As follows except as previously noted in HPI:  Constitutional: Negative for chills, diaphoresis, fatigue, fever and unexpected weight change. Respiratory: Negative for cough, shortness of breath and wheezing. Cardiovascular: Negative for chest pain and palpitations. Neurological: Negative for dizziness, syncope, cephalgia. GI / : negative  Musculoskeletal: see HPI       Objective:   Physical Exam   Constitutional: Oriented to person, place, and time. and appears well-developed and well-nourished. :   Head: Normocephalic and atraumatic. Eyes: EOM are normal.   Neck: Neck supple. Cardiovascular: Normal rate and regular rhythm. Pulmonary/Chest: Effort normal. No stridor. No respiratory distress, no wheezes. Abdominal:  No abnormal distension. Neurological: Alert and oriented to person, place, and time. Skin: Skin is warm and dry. Psychiatric: Normal mood and affect.  Behavior is normal. Thought content normal.    DONYA Marino DO    12/1/21  12:20 PM

## 2022-01-14 ENCOUNTER — OFFICE VISIT (OUTPATIENT)
Dept: ORTHOPEDIC SURGERY | Age: 72
End: 2022-01-14

## 2022-01-14 VITALS — HEIGHT: 66 IN | WEIGHT: 170 LBS | BODY MASS INDEX: 27.32 KG/M2

## 2022-01-14 DIAGNOSIS — Z96.652 S/P TKR (TOTAL KNEE REPLACEMENT) USING CEMENT, LEFT: Primary | ICD-10-CM

## 2022-01-14 PROCEDURE — 99024 POSTOP FOLLOW-UP VISIT: CPT | Performed by: ORTHOPAEDIC SURGERY

## 2022-01-14 NOTE — PROGRESS NOTES
Chief Complaint:   Chief Complaint   Patient presents with    Knee Pain     Left TKA follow up. DOS 10/21/2021, done with PT       Zelalem Spikes is a little less than 3 months postop left total knee replacement arthroplasty. She is doing very well. She has resumed bowling without problems. She does not complain of any pain seems to be real happy with the results. Allergies; medications; past medical, surgical, family, and social history; and problem list have been reviewed today and updated as indicated in this encounter seen below. Exam: The incision is well-healed. Alignment is good. She has a stable knee joint. She has good anterior and posterior dynamic stability. Patellofemoral alignment is good and her range of motion is near 0 to 105 degrees or more flexion. She has soft endpoint in flexion. Calf is supple without tenderness. Radiographs: None    ASSESSMENT:    Malaika Barragan was seen today for knee pain. Diagnoses and all orders for this visit:    S/P TKR (total knee replacement) using cement, left        PLAN: Continue with exercise and she should put a little more emphasis on extension stretching of both knees. We will follow-up in 3 months and x-ray of the left knee. Return in about 3 months (around 4/14/2022) for L knee X. Current Outpatient Medications   Medication Sig Dispense Refill    amLODIPine (NORVASC) 5 MG tablet       levothyroxine (SYNTHROID) 75 MCG tablet       metoprolol succinate (TOPROL XL) 25 MG extended release tablet       atorvastatin (LIPITOR) 20 MG tablet 20 mg daily       metoprolol tartrate (LOPRESSOR) 25 MG tablet 25 mg 2 times daily       losartan (COZAAR) 50 MG tablet 50 mg daily       metFORMIN (GLUCOPHAGE) 500 MG tablet 2 times daily (with meals)       alendronate (FOSAMAX) 70 MG tablet 70 mg every 7 days       SUMAtriptan (IMITREX) 50 MG tablet once as needed        No current facility-administered medications for this visit. Patient Active Problem List   Diagnosis    Arthritis of left knee    Hypertension    Hyperlipidemia    Thyroid disease    Diabetes mellitus ([address]       Past Medical History:   Diagnosis Date    Arthritis     Diabetes mellitus ([address]     Hyperlipidemia     Hypertension     PONV (postoperative nausea and vomiting)     Spinal headache     Thyroid disease        Past Surgical History:   Procedure Laterality Date    BLADDER SUSPENSION      COLONOSCOPY      FOOT SURGERY Bilateral     bunions, hammer toes      FOOT SURGERY Right 2021    CORRECTION ANGULAR DEFORMITY RIGHT 5TH DIGIT, PARTIAL EXCISION OF BONE RIGHT 5TH DIGIT (CPT 67766) performed by Katty Glaser DPM at [address]  [address]    JOINT REPLACEMENT Right 2018    done in [place]   knee    TOTAL KNEE ARTHROPLASTY Left 10/12/2021    TOTAL LEFT KNEE REPLACEMENT ARTHROPLASTY (CE) performed by Alta Yin DO at [address]   Allergen Reactions    Penicillins     Sulfa Antibiotics        Social History     Socioeconomic History    Marital status:      Spouse name: None    Number of children: None    Years of education: None    Highest education level: None   Occupational History    None   Tobacco Use    Smoking status: Former Smoker     Quit date:      Years since quittin.0    Smokeless tobacco: Never Used   Substance and Sexual Activity    Alcohol use: Never    Drug use: Never    Sexual activity: None   Other Topics Concern    None   Social History Narrative    None     Social Determinants of Health     Financial Resource Strain:     Difficulty of Paying Living Expenses: Not on file   Food Insecurity:     Worried About Running Out of Food in the Last Year: Not on file    Jaye of Food in the Last Year: Not on file   Transportation Needs:     Lack of Transportation (Medical): Not on file    Lack of Transportation (Non-Medical):  Not on file   Physical Activity:     Days of Exercise per Week: Not on file    Minutes of Exercise per Session: Not on file   Stress:     Feeling of Stress : Not on file   Social Connections:     Frequency of Communication with Friends and Family: Not on file    Frequency of Social Gatherings with Friends and Family: Not on file    Attends Moravian Services: Not on file    Active Member of [address] [place] [address] or Organizations: Not on file    Attends Club or Organization Meetings: Not on file    Marital Status: Not on file   Intimate Partner Violence:     Fear of Current or Ex-Partner: Not on file    Emotionally Abused: Not on file    Physically Abused: Not on file    Sexually Abused: Not on file   Housing Stability:     Unable to Pay for Housing in the Last Year: Not on file    Number of [place] in the Last Year: Not on file    Unstable Housing in the Last Year: Not on file       Review of Systems  As follows except as previously noted in HPI:  Constitutional: Negative for chills, diaphoresis, fatigue, fever and unexpected weight change. Respiratory: Negative for cough, shortness of breath and wheezing. Cardiovascular: Negative for chest pain and palpitations. Neurological: Negative for dizziness, syncope, cephalgia. GI / : negative  Musculoskeletal: see HPI       Objective:   Physical Exam   Constitutional: Oriented to person, place, and time. and appears well-developed and well-nourished. :   Head: Normocephalic and atraumatic. Eyes: EOM are normal.   Neck: Neck supple. Cardiovascular: Normal rate and regular rhythm. Pulmonary/Chest: Effort normal. No stridor. No respiratory distress, no wheezes. Abdominal:  No abnormal distension. Neurological: Alert and oriented to person, place, and time. Skin: Skin is warm and dry. Psychiatric: Normal mood and affect.  Behavior is normal. Thought content normal.    DONYA Scales DO    1/14/22  10:40 AM

## 2022-01-24 ENCOUNTER — HOSPITAL ENCOUNTER (OUTPATIENT)
Dept: MAMMOGRAPHY | Age: 72
Discharge: HOME | End: 2022-01-26
Payer: MEDICARE

## 2022-01-24 DIAGNOSIS — N95.9 MENOPAUSAL PROBLEM: ICD-10-CM

## 2022-01-24 PROCEDURE — 77080 DXA BONE DENSITY AXIAL: CPT

## 2022-04-14 DIAGNOSIS — M17.12 ARTHRITIS OF LEFT KNEE: Primary | ICD-10-CM

## 2022-04-18 ENCOUNTER — OFFICE VISIT (OUTPATIENT)
Dept: ORTHOPEDIC SURGERY | Age: 72
End: 2022-04-18
Payer: MEDICARE

## 2022-04-18 VITALS — TEMPERATURE: 98 F | BODY MASS INDEX: 25.71 KG/M2 | WEIGHT: 160 LBS | HEIGHT: 66 IN

## 2022-04-18 DIAGNOSIS — Z96.652 S/P TKR (TOTAL KNEE REPLACEMENT) USING CEMENT, LEFT: Primary | ICD-10-CM

## 2022-04-18 PROCEDURE — 1036F TOBACCO NON-USER: CPT | Performed by: ORTHOPAEDIC SURGERY

## 2022-04-18 PROCEDURE — G8399 PT W/DXA RESULTS DOCUMENT: HCPCS | Performed by: ORTHOPAEDIC SURGERY

## 2022-04-18 PROCEDURE — 1090F PRES/ABSN URINE INCON ASSESS: CPT | Performed by: ORTHOPAEDIC SURGERY

## 2022-04-18 PROCEDURE — 99213 OFFICE O/P EST LOW 20 MIN: CPT | Performed by: ORTHOPAEDIC SURGERY

## 2022-04-18 PROCEDURE — G8427 DOCREV CUR MEDS BY ELIG CLIN: HCPCS | Performed by: ORTHOPAEDIC SURGERY

## 2022-04-18 PROCEDURE — G8417 CALC BMI ABV UP PARAM F/U: HCPCS | Performed by: ORTHOPAEDIC SURGERY

## 2022-04-18 PROCEDURE — 1123F ACP DISCUSS/DSCN MKR DOCD: CPT | Performed by: ORTHOPAEDIC SURGERY

## 2022-04-18 PROCEDURE — 3017F COLORECTAL CA SCREEN DOC REV: CPT | Performed by: ORTHOPAEDIC SURGERY

## 2022-04-18 PROCEDURE — 4040F PNEUMOC VAC/ADMIN/RCVD: CPT | Performed by: ORTHOPAEDIC SURGERY

## 2022-04-18 NOTE — PROGRESS NOTES
Chief Complaint:   Chief Complaint   Patient presents with    Knee Pain     left knee TKA doing well has a knot on the top of the knee. Chana Enriquez follows up 6 months postop left total knee replacement arthroplasty. She is doing pretty well with the knee. She is back to bowling and doing a lot of activities. She still has a little discomfort on occasion. There is slight limitation of motion. Overall she is pleased      Allergies; medications; past medical, surgical, family, and social history; and problem list have been reviewed today and updated as indicated in this encounter seen below. Exam: The incision is well-healed. Alignment is good. Her knee joint is stable. Range of motion is lacking a few degrees of extension and flexes to 95 degrees. She has a fairly firm stop at that level. Her calf is supple with no tenderness. Radiographs: Imaging of the left knee 2 view shows a stable cemented total knee replacement arthroplasty in good alignment. ASSESSMENT:    Preethi Gardiner was seen today for knee pain. Diagnoses and all orders for this visit:    S/P TKR (total knee replacement) using cement, left        PLAN: Continue with activity as tolerated. We will follow-up in 6 months and x-ray of the knee once again. Return in about 6 months (around 10/18/2022) for X-ray left knee.        Current Outpatient Medications   Medication Sig Dispense Refill    amLODIPine (NORVASC) 5 MG tablet       levothyroxine (SYNTHROID) 75 MCG tablet       metoprolol succinate (TOPROL XL) 25 MG extended release tablet       atorvastatin (LIPITOR) 20 MG tablet 20 mg daily       metoprolol tartrate (LOPRESSOR) 25 MG tablet 25 mg 2 times daily       losartan (COZAAR) 50 MG tablet 50 mg daily       metFORMIN (GLUCOPHAGE) 500 MG tablet 2 times daily (with meals)       alendronate (FOSAMAX) 70 MG tablet 70 mg every 7 days       SUMAtriptan (IMITREX) 50 MG tablet once as needed        No current facility-administered medications for this visit. Patient Active Problem List   Diagnosis    Arthritis of left knee    Hypertension    Hyperlipidemia    Thyroid disease    Diabetes mellitus ([address]       Past Medical History:   Diagnosis Date    Arthritis     Diabetes mellitus ([address]     Hyperlipidemia     Hypertension     PONV (postoperative nausea and vomiting)     Spinal headache     Thyroid disease        Past Surgical History:   Procedure Laterality Date    BLADDER SUSPENSION      COLONOSCOPY      FOOT SURGERY Bilateral     bunions, hammer toes      FOOT SURGERY Right 2021    CORRECTION ANGULAR DEFORMITY RIGHT 5TH DIGIT, PARTIAL EXCISION OF BONE RIGHT 5TH DIGIT (CPT 91189) performed by Geeta Denny DPM at [address]  1999    JOINT REPLACEMENT Right 2018    [place] in [place]   knee    TOTAL KNEE ARTHROPLASTY Left 10/12/2021    TOTAL LEFT KNEE REPLACEMENT ARTHROPLASTY (CE) performed by Sandra Granger DO at [address] [facility]   Allergen Reactions    Penicillins     Sulfa Antibiotics        Social History     Socioeconomic History    Marital status:      Spouse name: None    Number of children: None    Years of education: None    Highest education level: None   Occupational History    None   Tobacco Use    Smoking status: Former Smoker     Quit date:      Years since quittin.3    Smokeless tobacco: Never Used   Substance and Sexual Activity    Alcohol use: Never    Drug use: Never    Sexual activity: None   Other Topics Concern    None   Social History Narrative    None     Social Determinants of Health     Financial Resource Strain:     Difficulty of Paying Living Expenses: Not on file   Food Insecurity:     Worried About Running Out of Food in the Last Year: Not on file    Jaye of Food in the Last Year: Not on file   Transportation Needs:     Lack of Transportation (Medical):  Not on file    Lack of Transportation (Non-Medical): Not on file   Physical Activity:     Days of Exercise per Week: Not on file    Minutes of Exercise per Session: Not on file   Stress:     Feeling of Stress : Not on file   Social Connections:     Frequency of Communication with Friends and Family: Not on file    Frequency of Social Gatherings with Friends and Family: Not on file    Attends Sabianist Services: Not on file    Active Member of [address] [place] [address] or Organizations: Not on file    Attends Club or Organization Meetings: Not on file    Marital Status: Not on file   Intimate Partner Violence:     Fear of Current or Ex-Partner: Not on file    Emotionally Abused: Not on file    Physically Abused: Not on file    Sexually Abused: Not on file   Housing Stability:     Unable to Pay for Housing in the Last Year: Not on file    Number of [place] in the Last Year: Not on file    Unstable Housing in the Last Year: Not on file       Review of Systems  As follows except as previously noted in HPI:  Constitutional: Negative for chills, diaphoresis, fatigue, fever and unexpected weight change. Respiratory: Negative for cough, shortness of breath and wheezing. Cardiovascular: Negative for chest pain and palpitations. Neurological: Negative for dizziness, syncope, cephalgia. GI / : negative  Musculoskeletal: see HPI       Objective:   Physical Exam   Constitutional: Oriented to person, place, and time. and appears well-developed and well-nourished. :   Head: Normocephalic and atraumatic. Eyes: EOM are normal.   Neck: Neck supple. Cardiovascular: Normal rate and regular rhythm. Pulmonary/Chest: Effort normal. No stridor. No respiratory distress, no wheezes. Abdominal:  No abnormal distension. Neurological: Alert and oriented to person, place, and time. Skin: Skin is warm and dry. Psychiatric: Normal mood and affect.  Behavior is normal. Thought content normal.    DONYA Méndez DO    4/18/22  12:27 PM

## 2022-06-15 ENCOUNTER — OFFICE VISIT (OUTPATIENT)
Dept: ORTHOPEDIC SURGERY | Age: 72
End: 2022-06-15
Payer: MEDICARE

## 2022-06-15 ENCOUNTER — TELEPHONE (OUTPATIENT)
Dept: ORTHOPEDIC SURGERY | Age: 72
End: 2022-06-15

## 2022-06-15 VITALS — WEIGHT: 160 LBS | HEIGHT: 66 IN | TEMPERATURE: 98 F | BODY MASS INDEX: 25.71 KG/M2

## 2022-06-15 DIAGNOSIS — M65.332 TRIGGER FINGER, LEFT MIDDLE FINGER: Primary | ICD-10-CM

## 2022-06-15 DIAGNOSIS — M65.321 TRIGGER FINGER, RIGHT INDEX FINGER: ICD-10-CM

## 2022-06-15 PROCEDURE — 1090F PRES/ABSN URINE INCON ASSESS: CPT | Performed by: ORTHOPAEDIC SURGERY

## 2022-06-15 PROCEDURE — G8417 CALC BMI ABV UP PARAM F/U: HCPCS | Performed by: ORTHOPAEDIC SURGERY

## 2022-06-15 PROCEDURE — 1036F TOBACCO NON-USER: CPT | Performed by: ORTHOPAEDIC SURGERY

## 2022-06-15 PROCEDURE — G8399 PT W/DXA RESULTS DOCUMENT: HCPCS | Performed by: ORTHOPAEDIC SURGERY

## 2022-06-15 PROCEDURE — G8427 DOCREV CUR MEDS BY ELIG CLIN: HCPCS | Performed by: ORTHOPAEDIC SURGERY

## 2022-06-15 PROCEDURE — 3017F COLORECTAL CA SCREEN DOC REV: CPT | Performed by: ORTHOPAEDIC SURGERY

## 2022-06-15 PROCEDURE — 99213 OFFICE O/P EST LOW 20 MIN: CPT | Performed by: ORTHOPAEDIC SURGERY

## 2022-06-15 PROCEDURE — 1123F ACP DISCUSS/DSCN MKR DOCD: CPT | Performed by: ORTHOPAEDIC SURGERY

## 2022-06-15 NOTE — PROGRESS NOTES
Emilie Barnes is a 74 y.o. female, who presents   Chief Complaint   Patient presents with    Hand Pain     Left hand middle trigger finger. Been occuring for about a month. Right hand index finger also starting to have problems. HPI[de-identified] Painful fingers with catching and snapping of been happening for some time. The right index finger is real bad with catching. The left middle finger is also problematic with this catching but a lot of pain and limited range of motion of the finger. Is been no aggressive treatment for this. Allergies; medications; past medical, surgical, family, and social history; and problem list have been reviewed today and updated as indicated in this encounter - see below following Ortho specifics. Musculoskeletal: Good condition gross neurovascular functions good in both hands and upper extremities. Elbow and wrist motion are good with no pain or instability. On the left hand slight prominence of the flexor tendon at the A1 band area. There is tenderness to palpation and also some limitation of motion. She is not demonstrating snapping in the left middle finger there is pain and obvious prominence in the tendon system with trigger finger clinically. The right index finger has more problems on the flexor and this obviously catches and releases with pain. Range of motion of the finger in general is good    Radiologic Studies: None    ASSESSMENT:  Elia Dupont was seen today for hand pain. Diagnoses and all orders for this visit:    Trigger finger, left middle finger    Trigger finger, right index finger     Treatment alternatives were reviewed including medical and physical therapies, injections, and surgical options, expected risks benefits and likely outcome of each were discussed in detail, questions asked and answered and understood. We discussed symptom as well as physical findings.   These problems are consistent with trigger finger of the left middle and right index fingers. Maciel options were discussed including local anesthetic and corticosteroid injection in the tendon sheath and surgical release which is more definitive. PLAN: Teressa Phelan decided to have the right index finger taken care of surgically to eliminate the problem altogether. She will follow-up with scheduling of the left middle at her convenience.         Patient Active Problem List   Diagnosis    Arthritis of left knee    Hypertension    Hyperlipidemia    Thyroid disease    Diabetes mellitus ([address]       Past Medical History:   Diagnosis Date    Arthritis     Diabetes mellitus ([address]     Hyperlipidemia     Hypertension     PONV (postoperative nausea and vomiting)     Spinal headache     Thyroid disease        Past Surgical History:   Procedure Laterality Date    BLADDER SUSPENSION  2016    COLONOSCOPY      FOOT SURGERY Bilateral     bunions, hammer toes      FOOT SURGERY Right 8/17/2021    CORRECTION ANGULAR DEFORMITY RIGHT 5TH DIGIT, PARTIAL EXCISION OF BONE RIGHT 5TH DIGIT (CPT 62647) performed by Maryjo Singh DPM at [address] ([address] [place] [address])  02/1999    JOINT REPLACEMENT Right 05/2018    done in [place]   knee    TOTAL KNEE ARTHROPLASTY Left 10/12/2021    TOTAL LEFT KNEE REPLACEMENT ARTHROPLASTY (CE) performed by Inderjit Cormier DO at [address]       Current Outpatient Medications   Medication Sig Dispense Refill    amLODIPine (NORVASC) 5 MG tablet       levothyroxine (SYNTHROID) 75 MCG tablet       metoprolol succinate (TOPROL XL) 25 MG extended release tablet       atorvastatin (LIPITOR) 20 MG tablet 20 mg daily       metoprolol tartrate (LOPRESSOR) 25 MG tablet 25 mg 2 times daily       losartan (COZAAR) 50 MG tablet 50 mg daily       metFORMIN (GLUCOPHAGE) 500 MG tablet 2 times daily (with meals)       alendronate (FOSAMAX) 70 MG tablet 70 mg every 7 days       SUMAtriptan (IMITREX) 50 MG tablet once as needed        No current pertinent family history. Review of Systems:   As follows except as previously noted in HPI:  Constitutional: Negative for chills, diaphoresis,  fever   Respiratory: Negative for cough, shortness of breath and wheezing. Cardiovascular: Negative for chest pain and palpitations. Neurological: Negative for dizziness, syncope,   GI / : abdominal pain or cramping  Musculoskeletal: see HPI       Objective:   Physical Exam   Constitutional: Oriented to person, place, and time. and appears well-developed and well-nourished. :   Head: Normocephalic and atraumatic. Neck: Neck supple. Eyes: EOM are normal.   Pulmonary/Chest: Effort normal.  No respiratory distress, no wheezes. Neurological: Alert and oriented to person  Skin: Skin is warm and dry. Griffin Haines DO    6/15/22  9:23 AM    All reasonable efforts have been made to minimize the risk of errors that may occur in the use of voice recognition and other electronic means of charting.

## 2022-06-15 NOTE — TELEPHONE ENCOUNTER
Prior Authorization Form:      DEMOGRAPHICS:                     Patient Name:  Elijah Shearer  Patient :  1950            Insurance:  Payor: MEDICARE / Plan: MEDICARE PART A AND B / Product Type: *No Product type* /   Insurance ID Number:    Payor/Plan Subscr  Sex Relation Sub. Ins. ID Effective Group Num   1. MEDICARE - ME* CITLALY ROJAS A 1950 Female Self [id number] 1/1/15                                    [address]  [address] A 1950 Female Self [id number]                                    [address] [id number]         DIAGNOSIS & PROCEDURE:                       Procedure/Operation: TENDON SHEATH EXCISION RIGHT INDEX FINGER - TRIGGER RIGHT INDEX          CPT Code: 28033    Diagnosis:  TRIGGER FINGER RIGHT INDEX    ICD10 Code: I84.137    Location:  [address] [place] [address]    Surgeon:  Cici Willard D.O.    SCHEDULING INFORMATION:                          Date: 2022    Time: TBA              Anesthesia:  McCle[place] Block                                                       Status:  Outpatient        Special Comments:  NONE       Electronically signed by Dariusz Dee on 6/15/2022 at 9:31 AM

## 2022-06-27 ENCOUNTER — ANESTHESIA EVENT (OUTPATIENT)
Dept: OPERATING ROOM | Age: 72
End: 2022-06-27
Payer: MEDICARE

## 2022-06-27 ASSESSMENT — LIFESTYLE VARIABLES: SMOKING_STATUS: 0

## 2022-06-27 NOTE — ANESTHESIA PRE PROCEDURE
Department of Anesthesiology  Preprocedure Note       Name:  Roger Menjivar   Age:  74 y.o.  :  1950                                          MRN:  [id number]         Date:  2022      Surgeon: Radha Acharya):  DONYA Méndez DO    Procedure: Procedure(s):  TENDON SHEATH EXCISION RIGHT INDEX FINGER, TRIGGER RELEASE RIGHT INDEX    Medications prior to admission:   Prior to Admission medications    Medication Sig Start Date End Date Taking?  Authorizing Provider   amLODIPine (NORVASC) 5 MG tablet Take 5 mg by mouth daily AM 21   Historical Provider, MD   levothyroxine (SYNTHROID) 75 MCG tablet Take 75 mcg by mouth Daily  21   Historical Provider, MD   metoprolol succinate (TOPROL XL) 25 MG extended release tablet Take 25 mg by mouth in the morning and at bedtime  21   Historical Provider, MD   metoprolol tartrate (LOPRESSOR) 25 MG tablet 25 mg 2 times daily  20   Historical Provider, MD   losartan (COZAAR) 50 MG tablet Take 50 mg by mouth daily AM 20   Historical Provider, MD   metFORMIN (GLUCOPHAGE) 500 MG tablet Take 500 mg by mouth 2 times daily (with meals)  20   Historical Provider, MD   alendronate (FOSAMAX) 70 MG tablet 70 mg every 7 days  20   Historical Provider, MD   SUMAtriptan (IMITREX) 50 MG tablet once as needed  6/10/20   Historical Provider, MD       Current medications:    Current Outpatient Medications   Medication Sig Dispense Refill    amLODIPine (NORVASC) 5 MG tablet Take 5 mg by mouth daily AM      levothyroxine (SYNTHROID) 75 MCG tablet Take 75 mcg by mouth Daily       metoprolol succinate (TOPROL XL) 25 MG extended release tablet Take 25 mg by mouth in the morning and at bedtime       metoprolol tartrate (LOPRESSOR) 25 MG tablet 25 mg 2 times daily       losartan (COZAAR) 50 MG tablet Take 50 mg by mouth daily AM      metFORMIN (GLUCOPHAGE) 500 MG tablet Take 500 mg by mouth 2 times daily (with meals)       alendronate (FOSAMAX) 70 MG tablet 70 mg every 7 days       SUMAtriptan (IMITREX) 50 MG tablet once as needed        No current facility-administered medications for this visit. Allergies: Allergies   Allergen Reactions    Penicillins Hives    Sulfa Antibiotics Other (See Comments)     Weird feeling  Felt sick    Oxycodone Nausea And Vomiting       Problem List:    Patient Active Problem List   Diagnosis Code    Arthritis of left knee M17.12    Hypertension I10    Hyperlipidemia E78.5    Thyroid disease E07.9    Diabetes mellitus ([address] E11.9       Past Medical History:        Diagnosis Date    Arthritis     Diabetes mellitus ([address]     Hyperlipidemia     Hypertension     PONV (postoperative nausea and vomiting)     with anesthesia    Spinal headache     Thyroid disease        Past Surgical History:        Procedure Laterality Date    BLADDER SUSPENSION      COLONOSCOPY      FOOT SURGERY Bilateral     bunions, hammer toes      FOOT SURGERY Right 2021    CORRECTION ANGULAR DEFORMITY RIGHT 5TH DIGIT, PARTIAL EXCISION OF BONE RIGHT 5TH DIGIT (CPT 18599) performed by Ne Radford DPM at [facility] ([address] [place] [address])  1999    JOINT REPLACEMENT Right 2018    done in [place]   knee    TOTAL KNEE ARTHROPLASTY Left 10/12/2021    TOTAL LEFT KNEE REPLACEMENT ARTHROPLASTY (CE) performed by Marcio Medina DO at [facility] History:    Social History     Tobacco Use    Smoking status: Former Smoker     Years: 35.00     Types: Cigarettes     Quit date:      Years since quittin.5    Smokeless tobacco: Never Used   Substance Use Topics    Alcohol use: Never                                Counseling given: Not Answered      Vital Signs (Current): There were no vitals filed for this visit.                                            BP Readings from Last 3 Encounters:   10/13/21 132/62   10/12/21 (!) 100/57   10/04/21 135/64       NPO Status: Cardiovascular:  Exercise tolerance: good (>4 METS),   (+) hypertension:, hyperlipidemia      ECG reviewed  Rhythm: regular  Rate: normal           Beta Blocker:  Dose within 24 Hrs      ROS comment: EKG= SR      Cleared by PCP     Neuro/Psych:   (+) headaches: migraine headaches,              ROS comment: And after surgery  GI/Hepatic/Renal: Neg GI/Hepatic/Renal ROS       (-) GERD       Endo/Other:    (+) DiabetesType II DM, , hypothyroidism: arthritis: OA., . Pt had PAT visit. Abdominal:       Abdomen: soft. Vascular: negative vascular ROS. Other Findings:             Anesthesia Plan      Hansford block and MAC     ASA 2     (PCP clearance on chart)  Induction: intravenous. continuous noninvasive hemodynamic monitor  MIPS: Prophylactic antiemetics administered. Anesthetic plan and risks discussed with patient. Use of blood products discussed with whom consented to blood products. Plan discussed with CRNA. Luis Livingston MD   6/27/2022  Luis Livingston MD  6/27/22    Pt seen questions answered plan outlined H&P reviewed accepts Hansford block with sedation. Kaleb Wright [phone].

## 2022-06-30 ENCOUNTER — HOSPITAL ENCOUNTER (OUTPATIENT)
Age: 72
Setting detail: OUTPATIENT SURGERY
Discharge: HOME | End: 2022-06-30
Attending: ORTHOPAEDIC SURGERY | Admitting: ORTHOPAEDIC SURGERY
Payer: MEDICARE

## 2022-06-30 ENCOUNTER — ANESTHESIA (OUTPATIENT)
Dept: OPERATING ROOM | Age: 72
End: 2022-06-30
Payer: MEDICARE

## 2022-06-30 VITALS
OXYGEN SATURATION: 95 % | HEIGHT: 66 IN | BODY MASS INDEX: 25.39 KG/M2 | HEART RATE: 64 BPM | TEMPERATURE: 97 F | RESPIRATION RATE: 16 BRPM | DIASTOLIC BLOOD PRESSURE: 58 MMHG | SYSTOLIC BLOOD PRESSURE: 127 MMHG | WEIGHT: 158 LBS

## 2022-06-30 DIAGNOSIS — M65.321 TRIGGER FINGER, RIGHT INDEX FINGER: Primary | Chronic | ICD-10-CM

## 2022-06-30 LAB — METER GLUCOSE: 146 MG/DL (ref 74–99)

## 2022-06-30 PROCEDURE — 26055 INCISE FINGER TENDON SHEATH: CPT | Performed by: ORTHOPAEDIC SURGERY

## 2022-06-30 PROCEDURE — 3600000002 HC SURGERY LEVEL 2 BASE: Performed by: ORTHOPAEDIC SURGERY

## 2022-06-30 PROCEDURE — 2580000003 HC RX 258: Performed by: ANESTHESIOLOGY

## 2022-06-30 PROCEDURE — 2500000003 HC RX 250 WO HCPCS: Performed by: ANESTHESIOLOGY

## 2022-06-30 PROCEDURE — 3600000012 HC SURGERY LEVEL 2 ADDTL 15MIN: Performed by: ORTHOPAEDIC SURGERY

## 2022-06-30 PROCEDURE — 7100000011 HC PHASE II RECOVERY - ADDTL 15 MIN: Performed by: ORTHOPAEDIC SURGERY

## 2022-06-30 PROCEDURE — 6360000002 HC RX W HCPCS

## 2022-06-30 PROCEDURE — 3700000001 HC ADD 15 MINUTES (ANESTHESIA): Performed by: ORTHOPAEDIC SURGERY

## 2022-06-30 PROCEDURE — 7100000010 HC PHASE II RECOVERY - FIRST 15 MIN: Performed by: ORTHOPAEDIC SURGERY

## 2022-06-30 PROCEDURE — 82962 GLUCOSE BLOOD TEST: CPT

## 2022-06-30 PROCEDURE — 3700000000 HC ANESTHESIA ATTENDED CARE: Performed by: ORTHOPAEDIC SURGERY

## 2022-06-30 PROCEDURE — 2709999900 HC NON-CHARGEABLE SUPPLY: Performed by: ORTHOPAEDIC SURGERY

## 2022-06-30 RX ORDER — MIDAZOLAM HYDROCHLORIDE 1 MG/ML
INJECTION INTRAMUSCULAR; INTRAVENOUS PRN
Status: DISCONTINUED | OUTPATIENT
Start: 2022-06-30 | End: 2022-06-30 | Stop reason: SDUPTHER

## 2022-06-30 RX ORDER — ONDANSETRON 2 MG/ML
INJECTION INTRAMUSCULAR; INTRAVENOUS PRN
Status: DISCONTINUED | OUTPATIENT
Start: 2022-06-30 | End: 2022-06-30 | Stop reason: SDUPTHER

## 2022-06-30 RX ORDER — DIPHENHYDRAMINE HYDROCHLORIDE 50 MG/ML
INJECTION INTRAMUSCULAR; INTRAVENOUS PRN
Status: DISCONTINUED | OUTPATIENT
Start: 2022-06-30 | End: 2022-06-30 | Stop reason: SDUPTHER

## 2022-06-30 RX ORDER — FENTANYL CITRATE 50 UG/ML
INJECTION, SOLUTION INTRAMUSCULAR; INTRAVENOUS PRN
Status: DISCONTINUED | OUTPATIENT
Start: 2022-06-30 | End: 2022-06-30 | Stop reason: SDUPTHER

## 2022-06-30 RX ORDER — PROPOFOL 10 MG/ML
INJECTION, EMULSION INTRAVENOUS CONTINUOUS PRN
Status: DISCONTINUED | OUTPATIENT
Start: 2022-06-30 | End: 2022-06-30 | Stop reason: SDUPTHER

## 2022-06-30 RX ORDER — ACETAMINOPHEN AND CODEINE PHOSPHATE 300; 30 MG/1; MG/1
1 TABLET ORAL EVERY 4 HOURS PRN
Qty: 30 TABLET | Refills: 0 | Status: SHIPPED | OUTPATIENT
Start: 2022-06-30 | End: 2022-07-07

## 2022-06-30 RX ORDER — LIDOCAINE HYDROCHLORIDE 5 MG/ML
INJECTION, SOLUTION INFILTRATION; INTRAVENOUS
Status: COMPLETED | OUTPATIENT
Start: 2022-06-30 | End: 2022-06-30

## 2022-06-30 RX ORDER — LIDOCAINE HYDROCHLORIDE 5 MG/ML
INJECTION, SOLUTION INFILTRATION; INTRAVENOUS PRN
Status: DISCONTINUED | OUTPATIENT
Start: 2022-06-30 | End: 2022-06-30

## 2022-06-30 RX ORDER — SODIUM CHLORIDE, SODIUM LACTATE, POTASSIUM CHLORIDE, CALCIUM CHLORIDE 600; 310; 30; 20 MG/100ML; MG/100ML; MG/100ML; MG/100ML
INJECTION, SOLUTION INTRAVENOUS CONTINUOUS
Status: DISCONTINUED | OUTPATIENT
Start: 2022-06-30 | End: 2022-06-30 | Stop reason: HOSPADM

## 2022-06-30 RX ADMIN — SODIUM CHLORIDE, POTASSIUM CHLORIDE, SODIUM LACTATE AND CALCIUM CHLORIDE: 600; 310; 30; 20 INJECTION, SOLUTION INTRAVENOUS at 07:43

## 2022-06-30 RX ADMIN — FENTANYL CITRATE 50 MCG: 50 INJECTION INTRAMUSCULAR; INTRAVENOUS at 08:52

## 2022-06-30 RX ADMIN — MIDAZOLAM 2 MG: 1 INJECTION INTRAMUSCULAR; INTRAVENOUS at 08:28

## 2022-06-30 RX ADMIN — FENTANYL CITRATE 50 MCG: 50 INJECTION INTRAMUSCULAR; INTRAVENOUS at 08:34

## 2022-06-30 RX ADMIN — PROPOFOL 100 MCG/KG/MIN: 10 INJECTION, EMULSION INTRAVENOUS at 08:34

## 2022-06-30 RX ADMIN — LIDOCAINE HYDROCHLORIDE 45 ML: 5 INJECTION, SOLUTION INFILTRATION at 08:39

## 2022-06-30 RX ADMIN — DIPHENHYDRAMINE HYDROCHLORIDE 12.5 MG: 50 INJECTION, SOLUTION INTRAMUSCULAR; INTRAVENOUS at 08:34

## 2022-06-30 RX ADMIN — ONDANSETRON 4 MG: 2 INJECTION INTRAMUSCULAR; INTRAVENOUS at 08:28

## 2022-06-30 ASSESSMENT — PAIN - FUNCTIONAL ASSESSMENT: PAIN_FUNCTIONAL_ASSESSMENT: 0-10

## 2022-06-30 ASSESSMENT — PAIN SCALES - GENERAL
PAINLEVEL_OUTOF10: 0

## 2022-06-30 NOTE — H&P
History and Physical      CHIEF COMPLAINT: Right index finger pain and catching    HISTORY OF PRESENT ILLNESS:      Clicking and limited range of motion    Past Medical History:        Diagnosis Date    Arthritis     Diabetes mellitus ([address]     Hyperlipidemia     Hypertension     PONV (postoperative nausea and vomiting)     with anesthesia    Spinal headache     Thyroid disease      Past Surgical History:        Procedure Laterality Date    BLADDER SUSPENSION  2016    COLONOSCOPY      FOOT SURGERY Bilateral     bunions, hammer toes      FOOT SURGERY Right 8/17/2021    CORRECTION ANGULAR DEFORMITY RIGHT 5TH DIGIT, PARTIAL EXCISION OF BONE RIGHT 5TH DIGIT (CPT 30670) performed by Juan Bean DPM at [facility]  ([address] [place] [address])  02/1999    JOINT REPLACEMENT Right 05/2018    done in [place]   knee    TOTAL KNEE ARTHROPLASTY Left 10/12/2021    TOTAL LEFT KNEE REPLACEMENT ARTHROPLASTY (CE) performed by Benito Seth DO at [place] [address] History:    TOBACCO:   reports that she quit smoking about 30 years ago. Her smoking use included cigarettes. She quit after 35.00 years of use. She has never used smokeless tobacco.  ETOH:   reports no history of alcohol use. DRUGS:   reports no history of drug use. Family History:   History reviewed. No pertinent family history. Medications Prior to Admission:  No medications prior to admission.   Allergies:  Penicillins, Sulfa antibiotics, and Oxycodone    CONSTITUTIONAL:  negative for  chills and anorexia  HEENT:  negative for  tinnitus  RESPIRATORY:  negative for  dyspnea and cyanosis  CARDIOVASCULAR:  negative for  palpitations, syncope  GASTROINTESTINAL:  negative for vomiting and hematemesis  GENITOURINARY:  negative for hematuria  ENDOCRINE:  negative for tremor  MUSCULOSKELETAL:  , Painful pulm right index finger with catching and limited range of motion  NEUROLOGICAL:  negative for seizures and syncope,

## 2022-06-30 NOTE — ANESTHESIA POSTPROCEDURE EVALUATION
Department of Anesthesiology  Postprocedure Note    Patient: Emre Barnes  MRN: [id number]  YOB: 1950  Date of evaluation: 6/30/2022      Procedure Summary     Date: 06/30/22 Room / Location: [address] [place] [address] 01 / [facility]    Anesthesia Start: 1274 Anesthesia Stop: 8599    Procedure: TENDON SHEATH EXCISION RIGHT INDEX FINGER, TRIGGER RELEASE RIGHT INDEX (Right ) Diagnosis:       Trigger index finger of right hand      (Trigger index finger of right hand [M65.321])    Surgeons: Meng Dawn DO Responsible Provider: Kamille Harris MD    Anesthesia Type: MAC ASA Status: 2          Anesthesia Type: MAC    Melania Phase I: Melania Score: 10    Melania Phase II: Melania Score: 10      Anesthesia Post Evaluation    Patient location during evaluation: PACU  Patient participation: complete - patient participated  Level of consciousness: awake  Pain score: 3  Airway patency: patent  Nausea & Vomiting: no nausea  Complications: no  Cardiovascular status: blood pressure returned to baseline  Respiratory status: acceptable  Hydration status: euvolemic

## 2022-06-30 NOTE — OP NOTE
Operative report        DATE OF PROCEDURE: 6/30/2022     SURGEON: Allyson Bond    ASSISTANT: None    PREOPERATIVE DIAGNOSIS: Trigger index finger    POSTOPERATIVE DIAGNOSIS: Same    OPERATION: Flexor tenovaginotomy right index finger    ANESTHESIA: IV regional    ESTIMATED BLOOD LOSS: Scant    COMPLICATIONS: None    SPECIMENS: Was sent to pathology    HISTORY: The patient is a 74y.o. year old female with history of above preop diagnosis. I explained the risk, benefits, expected outcome, and alternatives to the procedure. Patient understands and is in agreement. PROCEDURE: The patient brought to operating room after signs out were mid notified. Adequate IV regional anesthetic was administered by anesthesia with the patient supine on the operating table. The arms then prepped and draped sterile fashion. An incision was made transverse just distal to the distal palmar crease over the index ray of the right hand. 2 and half power loupe modification was used throughout the procedure. The subcu tissues were dissected bluntly. The flexor tenosynovium was incised proximally over the tendon. The soft tissue was elevated from the A1 band for direct visualization and the A1 band was split throughout its entire length releasing the flexor tendons. The tendons were then brought up out of the bed to ensure complete excursion and to lyse any adhesions. There were then replaced in her bed. Wound was then thoroughly irrigated and the skin closed with 6-0 Prolene simple erupted sutures. Xeroform gauze and a bulky dressing were applied. Tourniquet was deflated and good circulation turned to the upper extremity. Patient was then taken recovery in stable condition. GROSS PATHOLOGY: Thick tight A1 band and flexor tenosynovium right index finger with triggering.     All reasonable efforts have been made to minimize the risk of errors that may occur in the use of voice recognition and other electronic means of charting.       Electronically signed by Yordan Cuellar DO on 6/30/22 at 9:14 AM EDT

## 2022-06-30 NOTE — ANESTHESIA PROCEDURE NOTES
Peripheral Block    Patient location during procedure: OR  Reason for block: primary anesthetic  Start time: 6/30/2022 8:39 AM  End time: 6/30/2022 8:41 AM  Staffing  Anesthesiologist: Uzma Sanchez MD  Resident/CRNA: DYLAN Thompson - CRISTINE  Other anesthesia staff: Kvng Pro RN  Preanesthetic Checklist  Completed: patient identified, IV checked, site marked, risks and benefits discussed, surgical/procedural consents, equipment checked, pre-op evaluation, timeout performed, anesthesia consent given, oxygen available, monitors applied/VS acknowledged, fire risk safety assessment completed and verbalized and blood product R/B/A discussed and consented  Peripheral Block   Patient position: supine  Prep: alcohol swabs  Provider prep: mask  Patient monitoring: cardiac monitor, continuous pulse ox, continuous capnometry, IV access, oxygen and responsive to questions  Block type: Dominic block  Laterality: right  Injection technique: single-shot  Guidance: paresthesia technique    Assessment   Hemodynamics: stable  Outcomes: uncomplicated and patient tolerated procedure well    Medications Administered  Lidocaine injection 0.5%, 45 mL

## 2022-07-11 ENCOUNTER — TELEPHONE (OUTPATIENT)
Dept: ORTHOPEDIC SURGERY | Age: 72
End: 2022-07-11

## 2022-07-11 ENCOUNTER — OFFICE VISIT (OUTPATIENT)
Dept: ORTHOPEDIC SURGERY | Age: 72
End: 2022-07-11

## 2022-07-11 VITALS — WEIGHT: 158 LBS | TEMPERATURE: 98 F | HEIGHT: 66 IN | BODY MASS INDEX: 25.39 KG/M2

## 2022-07-11 DIAGNOSIS — M65.332 TRIGGER FINGER, LEFT MIDDLE FINGER: ICD-10-CM

## 2022-07-11 DIAGNOSIS — M65.321 TRIGGER FINGER, RIGHT INDEX FINGER: Primary | ICD-10-CM

## 2022-07-11 PROCEDURE — 99024 POSTOP FOLLOW-UP VISIT: CPT | Performed by: ORTHOPAEDIC SURGERY

## 2022-07-11 NOTE — TELEPHONE ENCOUNTER
Prior Authorization Form:      DEMOGRAPHICS:                     Patient Name:  Dafne Chan  Patient :  1950            Insurance:  Payor: MEDICARE / Plan: MEDICARE PART A AND B / Product Type: *No Product type* /   Insurance ID Number:    Payor/Plan Subscr  Sex Relation Sub. Ins. ID Effective Group Num   1. MEDICARE - ME* CITLALY ROJAS A 1950 Female Self [id number] 1/1/15                                    [address] [address]   2.  [address] 1950 Female Self [id number]                                   [id number]         DIAGNOSIS & PROCEDURE:                       Procedure/Operation: TENDON SHEATH RELEASE, LEFT MIDDLE FINGER, TRIGGER           CPT Code: 01180    Diagnosis:  TRIGGER FINGER, LEFT MIDDLE    ICD10 Code: J20.353    Location:  [address] [place] [address]    Surgeon:  Quang Calderón D.O.    SCHEDULING INFORMATION:                          Date: 2022    Time: TBA              Anesthesia:  Dominic Block                                                       Status:  Outpatient        Special Comments:  TBA       Electronically signed by Army Post on 2022 at 9:59 AM

## 2022-07-11 NOTE — PROGRESS NOTES
Chief Complaint:   Chief Complaint   Patient presents with    Hand Pain     Right hand trigger finger release DOS 6/30/22. Finger is doing well. Chana Enriquez follows up 11 days postop tenovaginotomy right index finger. She is doing really well from that. Its not catching or clicking and she has no pain. She now wants to have the left middle finger done in to get everything taken care of before she has foot surgery later in a month. Allergies; medications; past medical, surgical, family, and social history; and problem list have been reviewed today and updated as indicated in this encounter seen below. Exam: He was well approximated with sutures in place. Sutures removed without incident right index finger. Is pretty good range of motion which is limited mostly by her arthritic changes in her joints. The left middle finger is not catching per se but has limited range of motion there is tenderness and prominence of the flexor tendon at the A1 band. Radiographs: None    ASSESSMENT:    Hardy Chase was seen today for hand pain. Diagnoses and all orders for this visit:    Trigger finger, right index finger    Trigger finger, left middle finger        PLAN: Hardy Chase will protect the wound with a Band-Aid right index finger. She wants to schedule the left middle finger for release of the flexor tendon soon as possible and we will schedule her as an outpatient. Return if symptoms worsen or fail to improve.        Current Outpatient Medications   Medication Sig Dispense Refill    amLODIPine (NORVASC) 5 MG tablet Take 5 mg by mouth daily AM      levothyroxine (SYNTHROID) 75 MCG tablet Take 75 mcg by mouth Daily       metoprolol succinate (TOPROL XL) 25 MG extended release tablet Take 25 mg by mouth in the morning and at bedtime       metoprolol tartrate (LOPRESSOR) 25 MG tablet 25 mg 2 times daily       losartan (COZAAR) 50 MG tablet Take 50 mg by mouth daily AM      metFORMIN (GLUCOPHAGE) 500 MG tablet Take 500 mg by mouth 2 times daily (with meals)       alendronate (FOSAMAX) 70 MG tablet 70 mg every 7 days       SUMAtriptan (IMITREX) 50 MG tablet once as needed        No current facility-administered medications for this visit. Patient Active Problem List   Diagnosis    Arthritis of left knee    Hypertension    Hyperlipidemia    Thyroid disease    Diabetes mellitus ([address]    Trigger finger, right index finger       Past Medical History:   Diagnosis Date    Arthritis     Diabetes mellitus ([address]     Hyperlipidemia     Hypertension     PONV (postoperative nausea and vomiting)     with anesthesia    Spinal headache     Thyroid disease        Past Surgical History:   Procedure Laterality Date    BLADDER SUSPENSION  2016    COLONOSCOPY      FINGER TRIGGER RELEASE Right 6/30/2022    TENDON SHEATH EXCISION RIGHT INDEX FINGER, TRIGGER RELEASE RIGHT INDEX performed by Piotr Centeno DO at [facility] Bilateral     bunions, hammer toes      FOOT SURGERY Right 8/17/2021    CORRECTION ANGULAR DEFORMITY RIGHT 5TH DIGIT, PARTIAL EXCISION OF BONE RIGHT 5TH DIGIT (CPT 59781) performed by Nash Celaya DPM at [address] ([facility])  02/1999    JOINT REPLACEMENT Right 05/2018    done in [place]   knee    TOTAL KNEE ARTHROPLASTY Left 10/12/2021    TOTAL LEFT KNEE REPLACEMENT ARTHROPLASTY (CE) performed by Piotr Centeno DO at [address]   Allergen Reactions    Penicillins Hives    Sulfa Antibiotics Other (See Comments)     Weird feeling  Felt sick    Oxycodone Nausea And Vomiting       Social History     Socioeconomic History    Marital status:       Spouse name: None    Number of children: None    Years of education: None    Highest education level: None   Occupational History    None   Tobacco Use    Smoking status: Former Smoker     Years: 35.00     Types: Cigarettes     Quit date: 1992 Years since quittin.5    Smokeless tobacco: Never Used   Vaping Use    Vaping Use: Never used   Substance and Sexual Activity    Alcohol use: Never    Drug use: Never    Sexual activity: None   Other Topics Concern    None   Social History Narrative    None     Social Determinants of Health     Financial Resource Strain:     Difficulty of Paying Living Expenses: Not on file   Food Insecurity:     Worried About Running Out of Food in the Last Year: Not on file    Jaye of Food in the Last Year: Not on file   Transportation Needs:     Lack of Transportation (Medical): Not on file    Lack of Transportation (Non-Medical): Not on file   Physical Activity:     Days of Exercise per Week: Not on file    Minutes of Exercise per Session: Not on file   Stress:     Feeling of Stress : Not on file   Social Connections:     Frequency of Communication with Friends and Family: Not on file    Frequency of Social Gatherings with Friends and Family: Not on file    Attends Yarsanism Services: Not on file    Active Member of [address] [place] [address] or Organizations: Not on file    Attends Club or Organization Meetings: Not on file    Marital Status: Not on file   Intimate Partner Violence:     Fear of Current or Ex-Partner: Not on file    Emotionally Abused: Not on file    Physically Abused: Not on file    Sexually Abused: Not on file   Housing Stability:     Unable to Pay for Housing in the Last Year: Not on file    Number of [place] in the Last Year: Not on file    Unstable Housing in the Last Year: Not on file       Review of Systems  As follows except as previously noted in HPI:  Constitutional: Negative for chills, diaphoresis, fatigue, fever and unexpected weight change. Respiratory: Negative for cough, shortness of breath and wheezing. Cardiovascular: Negative for chest pain and palpitations. Neurological: Negative for dizziness, syncope, cephalgia.   GI / : negative  Musculoskeletal: see HPI Objective:   Physical Exam   Constitutional: Oriented to person, place, and time. and appears well-developed and well-nourished. :   Head: Normocephalic and atraumatic. Eyes: EOM are normal.   Neck: Neck supple. Cardiovascular: Normal rate and regular rhythm. Pulmonary/Chest: Effort normal. No stridor. No respiratory distress, no wheezes. Abdominal:  No abnormal distension. Neurological: Alert and oriented to person, place, and time. Skin: Skin is warm and dry. Psychiatric: Normal mood and affect.  Behavior is normal. Thought content normal.    DONYA Bingham DO    7/11/22  9:27 AM

## 2022-07-13 ENCOUNTER — ANESTHESIA EVENT (OUTPATIENT)
Dept: OPERATING ROOM | Age: 72
End: 2022-07-13
Payer: MEDICARE

## 2022-07-13 NOTE — ANESTHESIA PRE PROCEDURE
Department of Anesthesiology  Preprocedure Note       Name:  Aleah Live   Age:  74 y.o.  :  1950                                          MRN:  [id number]         Date:  2022      Surgeon: Mickey Galloway):  DONYA Perry DO    Procedure: Procedure(s):  TENDON SHEATH RELEASE, TRIGGER FINGER, LEFT MIDDLE    Medications prior to admission:   Prior to Admission medications    Medication Sig Start Date End Date Taking? Authorizing Provider   amLODIPine (NORVASC) 5 MG tablet Take 5 mg by mouth daily AM 21   Historical Provider, MD   levothyroxine (SYNTHROID) 75 MCG tablet Take 75 mcg by mouth Daily  21   Historical Provider, MD   metoprolol succinate (TOPROL XL) 25 MG extended release tablet Take 25 mg by mouth in the morning and at bedtime  21   Historical Provider, MD   metoprolol tartrate (LOPRESSOR) 25 MG tablet 25 mg 2 times daily  20   Historical Provider, MD   losartan (COZAAR) 50 MG tablet Take 50 mg by mouth daily AM 20   Historical Provider, MD   metFORMIN (GLUCOPHAGE) 500 MG tablet Take 500 mg by mouth 2 times daily (with meals)  20   Historical Provider, MD   alendronate (FOSAMAX) 70 MG tablet 70 mg every 7 days  20   Historical Provider, MD   SUMAtriptan (IMITREX) 50 MG tablet once as needed  6/10/20   Historical Provider, MD       Current medications:    No current facility-administered medications for this encounter.      Current Outpatient Medications   Medication Sig Dispense Refill    amLODIPine (NORVASC) 5 MG tablet Take 5 mg by mouth daily AM      levothyroxine (SYNTHROID) 75 MCG tablet Take 75 mcg by mouth Daily       metoprolol succinate (TOPROL XL) 25 MG extended release tablet Take 25 mg by mouth in the morning and at bedtime       metoprolol tartrate (LOPRESSOR) 25 MG tablet 25 mg 2 times daily       losartan (COZAAR) 50 MG tablet Take 50 mg by mouth daily AM      metFORMIN (GLUCOPHAGE) 500 MG tablet Take 500 mg by mouth 2 times daily (with meals)       alendronate (FOSAMAX) 70 MG tablet 70 mg every 7 days       SUMAtriptan (IMITREX) 50 MG tablet once as needed          Allergies:     Allergies   Allergen Reactions    Penicillins Hives    Sulfa Antibiotics Other (See Comments)     Weird feeling  Felt sick    Oxycodone Nausea And Vomiting       Problem List:    Patient Active Problem List   Diagnosis Code    Arthritis of left knee M17.12    Hypertension I10    Hyperlipidemia E78.5    Thyroid disease E07.9    Diabetes mellitus ([address] E11.9    Trigger finger, right index finger M65.321       Past Medical History:        Diagnosis Date    Arthritis     Diabetes mellitus ([address]     Hyperlipidemia     Hypertension     PONV (postoperative nausea and vomiting)     with anesthesia    Spinal headache     Thyroid disease        Past Surgical History:        Procedure Laterality Date    BLADDER SUSPENSION      COLONOSCOPY      FINGER TRIGGER RELEASE Right 2022    TENDON SHEATH EXCISION RIGHT INDEX FINGER, TRIGGER RELEASE RIGHT INDEX performed by Suresh Sibley DO at [facility] Bilateral     bunions, hammer toes      FOOT SURGERY Right 2021    CORRECTION ANGULAR DEFORMITY RIGHT 5TH DIGIT, PARTIAL EXCISION OF BONE RIGHT 5TH DIGIT (CPT 24346) performed by Christen Owens DPM at [address] ([address] [place] [address])  1999    JOINT REPLACEMENT Right 2018    done in [place]   knee    TOTAL KNEE ARTHROPLASTY Left 10/12/2021    TOTAL LEFT KNEE REPLACEMENT ARTHROPLASTY (CE) performed by Suresh Sibley DO at [facility] History:    Social History     Tobacco Use    Smoking status: Former Smoker     Years: 35.00     Types: Cigarettes     Quit date:      Years since quittin.5    Smokeless tobacco: Never Used   Substance Use Topics    Alcohol use: Never                                Counseling given: Not Answered      Vital Signs (Current):   Vitals:    22 1109   Weight: 158 lb (71.7 kg)   Height: 5' 6\" (1.676 m)                                              BP Readings from Last 3 Encounters:   06/30/22 (!) 127/58   10/13/21 132/62   10/12/21 (!) 100/57       NPO Status:                                                                                 BMI:   Wt Readings from Last 3 Encounters:   07/11/22 158 lb (71.7 kg)   07/11/22 158 lb (71.7 kg)   06/30/22 158 lb (71.7 kg)     Body mass index is 25.5 kg/m². CBC:   Lab Results   Component Value Date/Time    WBC 6.4 10/04/2021 09:25 AM    RBC 4.34 10/04/2021 09:25 AM    HGB 11.2 10/13/2021 04:51 AM    HCT 34.1 10/13/2021 04:51 AM    MCV 95.9 10/04/2021 09:25 AM    RDW 12.4 10/04/2021 09:25 AM     10/04/2021 09:25 AM       CMP:   Lab Results   Component Value Date/Time     10/13/2021 04:51 AM    K 4.3 10/13/2021 04:51 AM    K 4.0 10/04/2021 09:25 AM     10/13/2021 04:51 AM    CO2 21 10/13/2021 04:51 AM    BUN 19 10/13/2021 04:51 AM    CREATININE 0.7 10/13/2021 04:51 AM    GFRAA >60 10/13/2021 04:51 AM    LABGLOM >60 10/13/2021 04:51 AM    GLUCOSE 162 10/13/2021 04:51 AM    PROT 7.6 10/04/2021 09:25 AM    CALCIUM 8.0 10/13/2021 04:51 AM    BILITOT 0.4 10/04/2021 09:25 AM    ALKPHOS 170 10/04/2021 09:25 AM    AST 27 10/04/2021 09:25 AM    ALT 27 10/04/2021 09:25 AM       POC Tests: No results for input(s): POCGLU, POCNA, POCK, POCCL, POCBUN, POCHEMO, POCHCT in the last 72 hours.     Coags:   Lab Results   Component Value Date/Time    PROTIME 10.8 10/04/2021 09:25 AM    INR 0.9 10/04/2021 09:25 AM    APTT 29.2 10/04/2021 09:25 AM       HCG (If Applicable): No results found for: PREGTESTUR, PREGSERUM, HCG, HCGQUANT     ABGs: No results found for: PHART, PO2ART, HDC1KKY, JWX2XNF, BEART, Y0HHHETM     Type & Screen (If Applicable):  No results found for: LABABO, LABRH    Drug/Infectious Status (If Applicable):  No results found for: HIV, HEPCAB    COVID-19 Screening (If Applicable):   Lab Results Component Value Date/Time    COVID19 Not Detected 07/15/2020 12:41 PM           Anesthesia Evaluation     history of anesthetic complications: PONV. Airway:           Dental:          Pulmonary:                              Cardiovascular:    (+) hypertension:,                   Neuro/Psych:   (+) headaches:,             GI/Hepatic/Renal: Neg GI/Hepatic/Renal ROS            Endo/Other:    (+) DiabetesType II DM, , hypothyroidism::., .                 Abdominal:             Vascular:           Other Findings:           Anesthesia Plan      MAC and [place] block     ASA 2                             PAT Chart Review:  Chart reviewed per routine on July 13, 2022 at 8:57 AM by Brenda Hurd MD.  (Final assessment and plan per day of surgery team.)      Brenda Hurd MD   7/13/2022

## 2022-07-14 ENCOUNTER — ANESTHESIA (OUTPATIENT)
Dept: OPERATING ROOM | Age: 72
End: 2022-07-14
Payer: MEDICARE

## 2022-07-14 ENCOUNTER — HOSPITAL ENCOUNTER (OUTPATIENT)
Age: 72
Setting detail: OUTPATIENT SURGERY
Discharge: HOME | End: 2022-07-14
Attending: ORTHOPAEDIC SURGERY | Admitting: ORTHOPAEDIC SURGERY
Payer: MEDICARE

## 2022-07-14 VITALS
WEIGHT: 159 LBS | HEIGHT: 66 IN | SYSTOLIC BLOOD PRESSURE: 139 MMHG | DIASTOLIC BLOOD PRESSURE: 70 MMHG | RESPIRATION RATE: 16 BRPM | BODY MASS INDEX: 25.55 KG/M2 | HEART RATE: 62 BPM | OXYGEN SATURATION: 96 % | TEMPERATURE: 98 F

## 2022-07-14 PROBLEM — M65.332 TRIGGER FINGER, LEFT MIDDLE FINGER: Status: ACTIVE | Noted: 2022-06-30

## 2022-07-14 PROBLEM — M65.332 TRIGGER FINGER, LEFT MIDDLE FINGER: Chronic | Status: ACTIVE | Noted: 2022-06-30

## 2022-07-14 LAB — METER GLUCOSE: 131 MG/DL (ref 74–99)

## 2022-07-14 PROCEDURE — 7100000010 HC PHASE II RECOVERY - FIRST 15 MIN: Performed by: ORTHOPAEDIC SURGERY

## 2022-07-14 PROCEDURE — 3600000012 HC SURGERY LEVEL 2 ADDTL 15MIN: Performed by: ORTHOPAEDIC SURGERY

## 2022-07-14 PROCEDURE — 2500000003 HC RX 250 WO HCPCS: Performed by: NURSE ANESTHETIST, CERTIFIED REGISTERED

## 2022-07-14 PROCEDURE — 2580000003 HC RX 258: Performed by: ANESTHESIOLOGY

## 2022-07-14 PROCEDURE — 6370000000 HC RX 637 (ALT 250 FOR IP): Performed by: ANESTHESIOLOGY

## 2022-07-14 PROCEDURE — 3600000002 HC SURGERY LEVEL 2 BASE: Performed by: ORTHOPAEDIC SURGERY

## 2022-07-14 PROCEDURE — 82962 GLUCOSE BLOOD TEST: CPT | Performed by: ORTHOPAEDIC SURGERY

## 2022-07-14 PROCEDURE — 2709999900 HC NON-CHARGEABLE SUPPLY: Performed by: ORTHOPAEDIC SURGERY

## 2022-07-14 PROCEDURE — 3700000001 HC ADD 15 MINUTES (ANESTHESIA): Performed by: ORTHOPAEDIC SURGERY

## 2022-07-14 PROCEDURE — 6360000002 HC RX W HCPCS: Performed by: NURSE ANESTHETIST, CERTIFIED REGISTERED

## 2022-07-14 PROCEDURE — 7100000011 HC PHASE II RECOVERY - ADDTL 15 MIN: Performed by: ORTHOPAEDIC SURGERY

## 2022-07-14 PROCEDURE — 3700000000 HC ANESTHESIA ATTENDED CARE: Performed by: ORTHOPAEDIC SURGERY

## 2022-07-14 PROCEDURE — 82962 GLUCOSE BLOOD TEST: CPT

## 2022-07-14 PROCEDURE — 26055 INCISE FINGER TENDON SHEATH: CPT | Performed by: ORTHOPAEDIC SURGERY

## 2022-07-14 RX ORDER — LIDOCAINE HYDROCHLORIDE 20 MG/ML
INJECTION, SOLUTION INTRAVENOUS PRN
Status: DISCONTINUED | OUTPATIENT
Start: 2022-07-14 | End: 2022-07-14 | Stop reason: SDUPTHER

## 2022-07-14 RX ORDER — FENTANYL CITRATE 50 UG/ML
INJECTION, SOLUTION INTRAMUSCULAR; INTRAVENOUS PRN
Status: DISCONTINUED | OUTPATIENT
Start: 2022-07-14 | End: 2022-07-14 | Stop reason: SDUPTHER

## 2022-07-14 RX ORDER — LIDOCAINE HYDROCHLORIDE 5 MG/ML
INJECTION, SOLUTION INFILTRATION; INTRAVENOUS PRN
Status: DISCONTINUED | OUTPATIENT
Start: 2022-07-14 | End: 2022-07-14 | Stop reason: SDUPTHER

## 2022-07-14 RX ORDER — PROPOFOL 10 MG/ML
INJECTION, EMULSION INTRAVENOUS CONTINUOUS PRN
Status: DISCONTINUED | OUTPATIENT
Start: 2022-07-14 | End: 2022-07-14 | Stop reason: SDUPTHER

## 2022-07-14 RX ORDER — MIDAZOLAM HYDROCHLORIDE 1 MG/ML
INJECTION INTRAMUSCULAR; INTRAVENOUS PRN
Status: DISCONTINUED | OUTPATIENT
Start: 2022-07-14 | End: 2022-07-14 | Stop reason: SDUPTHER

## 2022-07-14 RX ORDER — METOCLOPRAMIDE 10 MG/1
10 TABLET ORAL ONCE
Status: COMPLETED | OUTPATIENT
Start: 2022-07-14 | End: 2022-07-14

## 2022-07-14 RX ORDER — ONDANSETRON 2 MG/ML
INJECTION INTRAMUSCULAR; INTRAVENOUS PRN
Status: DISCONTINUED | OUTPATIENT
Start: 2022-07-14 | End: 2022-07-14 | Stop reason: SDUPTHER

## 2022-07-14 RX ORDER — FAMOTIDINE 20 MG/1
20 TABLET, FILM COATED ORAL ONCE
Status: COMPLETED | OUTPATIENT
Start: 2022-07-14 | End: 2022-07-14

## 2022-07-14 RX ORDER — SODIUM CHLORIDE, SODIUM LACTATE, POTASSIUM CHLORIDE, CALCIUM CHLORIDE 600; 310; 30; 20 MG/100ML; MG/100ML; MG/100ML; MG/100ML
INJECTION, SOLUTION INTRAVENOUS CONTINUOUS
Status: DISCONTINUED | OUTPATIENT
Start: 2022-07-14 | End: 2022-07-14 | Stop reason: HOSPADM

## 2022-07-14 RX ADMIN — SODIUM CHLORIDE, POTASSIUM CHLORIDE, SODIUM LACTATE AND CALCIUM CHLORIDE: 600; 310; 30; 20 INJECTION, SOLUTION INTRAVENOUS at 10:29

## 2022-07-14 RX ADMIN — ONDANSETRON 4 MG: 2 INJECTION INTRAMUSCULAR; INTRAVENOUS at 12:34

## 2022-07-14 RX ADMIN — MIDAZOLAM 1 MG: 1 INJECTION INTRAMUSCULAR; INTRAVENOUS at 12:15

## 2022-07-14 RX ADMIN — SODIUM CHLORIDE, POTASSIUM CHLORIDE, SODIUM LACTATE AND CALCIUM CHLORIDE: 600; 310; 30; 20 INJECTION, SOLUTION INTRAVENOUS at 12:13

## 2022-07-14 RX ADMIN — MIDAZOLAM 1 MG: 1 INJECTION INTRAMUSCULAR; INTRAVENOUS at 12:19

## 2022-07-14 RX ADMIN — PROPOFOL 50 MCG/KG/MIN: 10 INJECTION, EMULSION INTRAVENOUS at 12:27

## 2022-07-14 RX ADMIN — METOCLOPRAMIDE 10 MG: 10 TABLET ORAL at 10:17

## 2022-07-14 RX ADMIN — FENTANYL CITRATE 50 MCG: 50 INJECTION INTRAMUSCULAR; INTRAVENOUS at 12:19

## 2022-07-14 RX ADMIN — LIDOCAINE HYDROCHLORIDE 50 ML: 5 INJECTION, SOLUTION INFILTRATION at 12:26

## 2022-07-14 RX ADMIN — LIDOCAINE HYDROCHLORIDE 40 MG: 20 INJECTION, SOLUTION INTRAVENOUS at 12:27

## 2022-07-14 RX ADMIN — FAMOTIDINE 20 MG: 20 TABLET, FILM COATED ORAL at 10:17

## 2022-07-14 ASSESSMENT — PAIN - FUNCTIONAL ASSESSMENT
PAIN_FUNCTIONAL_ASSESSMENT: 0-10
PAIN_FUNCTIONAL_ASSESSMENT: PREVENTS OR INTERFERES SOME ACTIVE ACTIVITIES AND ADLS

## 2022-07-14 ASSESSMENT — PAIN DESCRIPTION - DESCRIPTORS: DESCRIPTORS: CRAMPING

## 2022-07-14 NOTE — ANESTHESIA PRE PROCEDURE
Department of Anesthesiology  Preprocedure Note       Name:  Clifton Macedo   Age:  74 y.o.  :  1950                                          MRN:  [id number]         Date:  2022      Surgeon: Lord Hill):  DONYA Obregon DO    Procedure: Procedure(s):  TENDON SHEATH RELEASE, TRIGGER FINGER, LEFT MIDDLE    Medications prior to admission:   Prior to Admission medications    Medication Sig Start Date End Date Taking? Authorizing Provider   amLODIPine (NORVASC) 5 MG tablet Take 5 mg by mouth daily AM 21   Historical Provider, MD   levothyroxine (SYNTHROID) 75 MCG tablet Take 75 mcg by mouth Daily  21   Historical Provider, MD   metoprolol succinate (TOPROL XL) 25 MG extended release tablet Take 25 mg by mouth in the morning and at bedtime  21   Historical Provider, MD   metoprolol tartrate (LOPRESSOR) 25 MG tablet 25 mg 2 times daily  20   Historical Provider, MD   losartan (COZAAR) 50 MG tablet Take 50 mg by mouth daily AM 20   Historical Provider, MD   metFORMIN (GLUCOPHAGE) 500 MG tablet Take 500 mg by mouth 2 times daily (with meals)  20   Historical Provider, MD   alendronate (FOSAMAX) 70 MG tablet 70 mg every 7 days  20   Historical Provider, MD   SUMAtriptan (IMITREX) 50 MG tablet once as needed  6/10/20   Historical Provider, MD       Current medications:    Current Facility-Administered Medications   Medication Dose Route Frequency Provider Last Rate Last Admin    lactated ringers infusion   IntraVENous Continuous Manoj House  mL/hr at 22 1029 New Bag at 22 1213       Allergies:     Allergies   Allergen Reactions    Penicillins Hives    Sulfa Antibiotics Other (See Comments)     Weird feeling  Felt sick    Oxycodone Nausea And Vomiting       Problem List:    Patient Active Problem List   Diagnosis Code    Arthritis of left knee M17.12    Hypertension I10    Hyperlipidemia E78.5    Thyroid disease E07.9    Diabetes mellitus ([address] E11.9    Trigger finger, left middle finger M65.332       Past Medical History:        Diagnosis Date    Arthritis     Diabetes mellitus ([address]     Hyperlipidemia     Hypertension     PONV (postoperative nausea and vomiting)     with anesthesia    Spinal headache     Thyroid disease        Past Surgical History:        Procedure Laterality Date    BLADDER SUSPENSION  2016    COLONOSCOPY      FINGER TRIGGER RELEASE Right 2022    TENDON SHEATH EXCISION RIGHT INDEX FINGER, TRIGGER RELEASE RIGHT INDEX performed by Benito Seth DO at [address] [facility] Bilateral     bunions, hammer toes      FOOT SURGERY Right 2021    CORRECTION ANGULAR DEFORMITY RIGHT 5TH DIGIT, PARTIAL EXCISION OF BONE RIGHT 5TH DIGIT (CPT 24661) performed by Juan Bean DPM at [address] ([address] [place] [address])  1999    JOINT REPLACEMENT Right 2018    done in [place]   knee    TOTAL KNEE ARTHROPLASTY Left 10/12/2021    TOTAL LEFT KNEE REPLACEMENT ARTHROPLASTY (CE) performed by Benito Seth DO at [facility] History:    Social History     Tobacco Use    Smoking status: Former Smoker     Years: 35.00     Types: Cigarettes     Quit date:      Years since quittin.5    Smokeless tobacco: Never Used   Substance Use Topics    Alcohol use: Never                                Counseling given: Not Answered      Vital Signs (Current):   Vitals:    22 1109 22 1009 22 1248 22 1249   BP:  (!) 162/60 114/62 (!) 119/58   Pulse:  69 60 55   Resp:  20 20 16   Temp:  98 °F (36.7 °C)     TempSrc:  Skin     SpO2:  95% 98% 92%   Weight: 158 lb (71.7 kg) 159 lb (72.1 kg)     Height: 5' 6\" (1.676 m) 5' 6\" (1.676 m)                                                BP Readings from Last 3 Encounters:   22 (!) 119/58   22 (!) 127/58   10/13/21 132/62       NPO Status: Time of last liquid consumption:  Time of last solid consumption: 1900                        Date of last liquid consumption: 07/13/22                        Date of last solid food consumption: 07/13/22    BMI:   Wt Readings from Last 3 Encounters:   07/14/22 159 lb (72.1 kg)   07/11/22 158 lb (71.7 kg)   06/30/22 158 lb (71.7 kg)     Body mass index is 25.66 kg/m². CBC:   Lab Results   Component Value Date/Time    WBC 6.4 10/04/2021 09:25 AM    RBC 4.34 10/04/2021 09:25 AM    HGB 11.2 10/13/2021 04:51 AM    HCT 34.1 10/13/2021 04:51 AM    MCV 95.9 10/04/2021 09:25 AM    RDW 12.4 10/04/2021 09:25 AM     10/04/2021 09:25 AM       CMP:   Lab Results   Component Value Date/Time     10/13/2021 04:51 AM    K 4.3 10/13/2021 04:51 AM    K 4.0 10/04/2021 09:25 AM     10/13/2021 04:51 AM    CO2 21 10/13/2021 04:51 AM    BUN 19 10/13/2021 04:51 AM    CREATININE 0.7 10/13/2021 04:51 AM    GFRAA >60 10/13/2021 04:51 AM    LABGLOM >60 10/13/2021 04:51 AM    GLUCOSE 162 10/13/2021 04:51 AM    PROT 7.6 10/04/2021 09:25 AM    CALCIUM 8.0 10/13/2021 04:51 AM    BILITOT 0.4 10/04/2021 09:25 AM    ALKPHOS 170 10/04/2021 09:25 AM    AST 27 10/04/2021 09:25 AM    ALT 27 10/04/2021 09:25 AM       POC Tests: No results for input(s): POCGLU, POCNA, POCK, POCCL, POCBUN, POCHEMO, POCHCT in the last 72 hours.     Coags:   Lab Results   Component Value Date/Time    PROTIME 10.8 10/04/2021 09:25 AM    INR 0.9 10/04/2021 09:25 AM    APTT 29.2 10/04/2021 09:25 AM       HCG (If Applicable): No results found for: PREGTESTUR, PREGSERUM, HCG, HCGQUANT     ABGs: No results found for: PHART, PO2ART, NJM7UTM, EFF0PBR, BEART, T8GVSEDY     Type & Screen (If Applicable):  No results found for: LABABO, LABRH    Drug/Infectious Status (If Applicable):  No results found for: HIV, HEPCAB    COVID-19 Screening (If Applicable):   Lab Results   Component Value Date/Time    COVID19 Not Detected 07/15/2020 12:41 PM           Anesthesia Evaluation     history of anesthetic complications: PONV. Airway: Mallampati: II  TM distance: >3 FB   Neck ROM: full  Mouth opening: > = 3 FB   Dental: normal exam         Pulmonary:Negative Pulmonary ROS                              Cardiovascular:    (+) hypertension:, hyperlipidemia        Rhythm: regular  Rate: normal                    Neuro/Psych:   (+) headaches:,             GI/Hepatic/Renal:             Endo/Other:    (+) Diabetes, hypothyroidism::., .                 Abdominal:       Abdomen: soft. Vascular: Other Findings:           Anesthesia Plan      MAC and [place] block     ASA 3       Induction: intravenous. Anesthetic plan and risks discussed with patient. Plan discussed with CRNA.                     Love Julian MD   7/14/2022

## 2022-07-14 NOTE — H&P
History and Physical      CHIEF COMPLAINT: Catching left middle finger    HISTORY OF PRESENT ILLNESS:      Popping and tenderness. Past Medical History:        Diagnosis Date    Arthritis     Diabetes mellitus ([address]     Hyperlipidemia     Hypertension     PONV (postoperative nausea and vomiting)     with anesthesia    Spinal headache     Thyroid disease      Past Surgical History:        Procedure Laterality Date    BLADDER SUSPENSION  2016    COLONOSCOPY      FINGER TRIGGER RELEASE Right 6/30/2022    TENDON SHEATH EXCISION RIGHT INDEX FINGER, TRIGGER RELEASE RIGHT INDEX performed by Phuc Lovett DO at [facility] Bilateral     bunions, hammer toes      FOOT SURGERY Right 8/17/2021    CORRECTION ANGULAR DEFORMITY RIGHT 5TH DIGIT, PARTIAL EXCISION OF BONE RIGHT 5TH DIGIT (CPT 47005) performed by Martha Cheadle, DPM at [address] [facility]  ([address] [place] [address])  02/1999    JOINT REPLACEMENT Right 05/2018    done in [place]   knee    TOTAL KNEE ARTHROPLASTY Left 10/12/2021    TOTAL LEFT KNEE REPLACEMENT ARTHROPLASTY (CE) performed by Phuc Lovett DO at [place] [address] History:    TOBACCO:   reports that she quit smoking about 30 years ago. Her smoking use included cigarettes. She quit after 35.00 years of use. She has never used smokeless tobacco.  ETOH:   reports no history of alcohol use. DRUGS:   reports no history of drug use. Family History:   History reviewed. No pertinent family history. Medications Prior to Admission:  No medications prior to admission.   Allergies:  Penicillins, Sulfa antibiotics, and Oxycodone    CONSTITUTIONAL:  negative for  chills and anorexia  HEENT:  negative for  tinnitus  RESPIRATORY:  negative for  dyspnea and cyanosis  CARDIOVASCULAR:  negative for  palpitations, syncope  GASTROINTESTINAL:  negative for vomiting and hematemesis  GENITOURINARY:  negative for hematuria  ENDOCRINE:  negative for tremor  MUSCULOSKELETAL: Creased range of motion left middle finger with catching and pain,  NEUROLOGICAL:  negative for seizures and syncope,    BEHAVIOR/PSYCH:  negative for agitated and anxiety    PHYSICAL EXAM:  Ht 5' 6\" (1.676 m)   Wt 158 lb (71.7 kg)   BMI 25.50 kg/m²   General appearance:  awake, alert, cooperative, no apparent distress, and appears stated age  Neurologic:  Awake, alert, oriented to name, place and time. Cranial nerves II-XII are grossly intact. Motor is 5 out of 5 bilaterally. Cerebellar finger to nose, heel to shin intact. Sensory is intact.   Babinski down going, Romberg negative, and gait is normal.  Lungs:  No increased work of breathing, good air exchange, clear to auscultation bilaterally, no crackles or wheezing  Heart:  Normal apical impulse, regular rate and rhythm, normal S1 and S2, no S3 or S4, and no murmur noted  Abdomen:  normal bowel sounds  Skin: warm and dry, no rash or erythema  ENT: tympanic membrane, external ear and ear canal normal bilaterally, oropharynx clear and moist with normal mucous membranes  Musculoskeletal: , Flexor prominence A1 band left middle finger with catching finger with flexion and extension pain    General Labs:  CBC:   Lab Results   Component Value Date/Time    WBC 6.4 10/04/2021 09:25 AM    RBC 4.34 10/04/2021 09:25 AM    HGB 11.2 10/13/2021 04:51 AM    HCT 34.1 10/13/2021 04:51 AM    MCV 95.9 10/04/2021 09:25 AM    RDW 12.4 10/04/2021 09:25 AM     10/04/2021 09:25 AM     CMP:    Lab Results   Component Value Date/Time     10/13/2021 04:51 AM    K 4.3 10/13/2021 04:51 AM    K 4.0 10/04/2021 09:25 AM     10/13/2021 04:51 AM    CO2 21 10/13/2021 04:51 AM    BUN 19 10/13/2021 04:51 AM    PROT 7.6 10/04/2021 09:25 AM     U/A:  No components found for: Jorene Breach, USPGRAV, UPH, UPROTEIN, UGLUCOSE, UKETONE, UBILI, UBLOOD, UNITRITE, [facility]ale [facility], [place], DHARMESH, [place], [place] [facility], Taurus Ren    Radiology: None    ASSESSMENT AND PLAN:    Flexor tenovaginotomy left middle finger      Electronically signed by Ivana Burks DO on 7/14/2022 at 8:11 AM

## 2022-07-14 NOTE — ANESTHESIA POSTPROCEDURE EVALUATION
Department of Anesthesiology  Postprocedure Note    Patient: Kiel Gomez  MRN: [id number]  YOB: 1950  Date of evaluation: 7/14/2022      Procedure Summary     Date: 07/14/22 Room / Location: [address] [place] [address] 01 / [address] [facility], [place]    Anesthesia Start: 4456 Anesthesia Stop: 6054    Procedure: TENDON SHEATH RELEASE, TRIGGER FINGER, LEFT MIDDLE (Left Hand) Diagnosis:       Trigger finger, left middle finger      (Trigger finger, left middle finger [M65.332])    Surgeons: Piotr Centeno DO Responsible Provider: Anne-Marie Mcmahon MD    Anesthesia Type: MAC, [place] block ASA Status: 3          Anesthesia Type: MAC, [place] block    Melania Phase I: Melania Score: 10    Melania Phase II: Melania Score: 10      Anesthesia Post Evaluation    Patient location during evaluation: PACU  Patient participation: complete - patient participated  Level of consciousness: awake  Pain score: 3  Airway patency: patent  Nausea & Vomiting: no nausea  Complications: no  Cardiovascular status: blood pressure returned to baseline  Respiratory status: acceptable  Hydration status: euvolemic

## 2022-07-14 NOTE — ANESTHESIA PROCEDURE NOTES
Peripheral Block    Patient location during procedure: OR  Reason for block: primary anesthetic and at surgeon's request  Start time: 7/14/2022 12:24 PM  End time: 7/14/2022 12:26 PM  Staffing  Performed: other anesthesia staff   Anesthesiologist: Tete Murillo MD  Resident/CRNA: DYLAN Bear - CRISTINE  Other anesthesia staff: Jun Cid RN  Preanesthetic Checklist  Completed: patient identified, IV checked, site marked, risks and benefits discussed, surgical/procedural consents, equipment checked, pre-op evaluation, timeout performed, anesthesia consent given, oxygen available, monitors applied/VS acknowledged, fire risk safety assessment completed and verbalized and blood product R/B/A discussed and consented  Peripheral Block   Patient position: supine  Prep: alcohol swabs  Provider prep: mask  Patient monitoring: cardiac monitor, continuous pulse ox, continuous capnometry, frequent blood pressure checks, IV access, oxygen and responsive to questions  Block type: [place] block  Laterality: left  Injection technique: single-shot  Guidance: other  Local infiltration: lidocaine  Infiltration strength: 0.5 %  Local infiltration: lidocaine  Dose: 50 mL    Assessment   Injection assessment: negative aspiration for heme and no paresthesia on injection  Paresthesia pain: none  Slow fractionated injection: yes  Hemodynamics: stable  Outcomes: patient tolerated procedure well

## 2022-07-15 ENCOUNTER — ANESTHESIA EVENT (OUTPATIENT)
Dept: OPERATING ROOM | Age: 72
End: 2022-07-15
Payer: MEDICARE

## 2022-07-19 ASSESSMENT — LIFESTYLE VARIABLES: SMOKING_STATUS: 0

## 2022-07-19 NOTE — ANESTHESIA PRE PROCEDURE
Department of Anesthesiology  Preprocedure Note       Name:  Cielo Carlos   Age:  74 y.o.  :  1950                                          MRN:  [id number]         Date:  2022      Surgeon: Abhishek Jacobs):  Irvin Johnson DPM    Procedure: Procedure(s):  TAILOR'S BUNIONECTOMY RIGHT FOOT    Medications prior to admission:   Prior to Admission medications    Medication Sig Start Date End Date Taking? Authorizing Provider   amLODIPine (NORVASC) 5 MG tablet Take 5 mg by mouth daily AM 21   Historical Provider, MD   levothyroxine (SYNTHROID) 75 MCG tablet Take 75 mcg by mouth Daily  21   Historical Provider, MD   metoprolol succinate (TOPROL XL) 25 MG extended release tablet Take 25 mg by mouth in the morning and at bedtime  21   Historical Provider, MD   metoprolol tartrate (LOPRESSOR) 25 MG tablet 25 mg 2 times daily  20   Historical Provider, MD   losartan (COZAAR) 50 MG tablet Take 50 mg by mouth daily AM 20   Historical Provider, MD   metFORMIN (GLUCOPHAGE) 500 MG tablet Take 500 mg by mouth 2 times daily (with meals)  20   Historical Provider, MD   alendronate (FOSAMAX) 70 MG tablet 70 mg every 7 days  20   Historical Provider, MD   SUMAtriptan (IMITREX) 50 MG tablet once as needed  6/10/20   Historical Provider, MD       Current medications:    Current Facility-Administered Medications   Medication Dose Route Frequency Provider Last Rate Last Admin    lactated ringers infusion   IntraVENous Continuous Manoj House MD        clindamycin (CLEOCIN) 900 mg in dextrose 5 % 50 mL IVPB  900 mg IntraVENous Once Irvin Johnson DPM           Allergies:     Allergies   Allergen Reactions    Penicillins Hives    Sulfa Antibiotics Other (See Comments)     Weird feeling  Felt sick    Oxycodone Nausea And Vomiting       Problem List:    Patient Active Problem List   Diagnosis Code    Arthritis of left knee M17.12    Hypertension I10    Hyperlipidemia E78.5    Thyroid disease E07.9    Diabetes mellitus ([address] E11.9    Trigger finger, left middle finger M65.332       Past Medical History:        Diagnosis Date    Arthritis     Diabetes mellitus ([address]     Hyperlipidemia     Hypertension     PONV (postoperative nausea and vomiting)     with anesthesia    Spinal headache     Thyroid disease        Past Surgical History:        Procedure Laterality Date    BLADDER SUSPENSION  2016    COLONOSCOPY      FINGER TRIGGER RELEASE Right 2022    TENDON SHEATH EXCISION RIGHT INDEX FINGER, TRIGGER RELEASE RIGHT INDEX performed by Paul Kraus DO at [address] Left 2022    TENDON SHEATH RELEASE, TRIGGER FINGER, LEFT MIDDLE performed by Paul Kraus DO at [facility] Bilateral     bunions, hammer toes      FOOT SURGERY Right 2021    CORRECTION ANGULAR DEFORMITY RIGHT 5TH DIGIT, PARTIAL EXCISION OF BONE RIGHT 5TH DIGIT (CPT 15109) performed by Nalini Cruz DPM at [address] [facility] ([address] [place] [address])  1999    JOINT REPLACEMENT Right 2018    done in [place]   knee    TOTAL KNEE ARTHROPLASTY Left 10/12/2021    TOTAL LEFT KNEE REPLACEMENT ARTHROPLASTY (CE) performed by Paul Kraus DO at [facility] History:    Social History     Tobacco Use    Smoking status: Former     Years: 35.00     Types: Cigarettes     Quit date:      Years since quittin.    Smokeless tobacco: Never   Substance Use Topics    Alcohol use: Never                                Counseling given: Not Answered      Vital Signs (Current):   Vitals:    07/15/22 1219 22 0615   BP:  (!) 144/63   Pulse:  65   Resp:  16   Temp:  97 °F (36.1 °C)   TempSrc:  Skin   SpO2:  96%   Weight: 159 lb (72.1 kg) 158 lb (71.7 kg)   Height: 5' 6\" (1.676 m) 5' 6\" (1.676 m)                                              BP Readings from Last 3 Encounters:   22 (!) 144/63 07/14/22 139/70   06/30/22 (!) 127/58       NPO Status:                        Time of last solid consumption: 1900                        Date of last liquid consumption: 07/21/22                        Date of last solid food consumption: 07/21/22    BMI:   Wt Readings from Last 3 Encounters:   07/22/22 158 lb (71.7 kg)   07/14/22 159 lb (72.1 kg)   07/11/22 158 lb (71.7 kg)     Body mass index is 25.5 kg/m². CBC:   Lab Results   Component Value Date/Time    WBC 6.4 10/04/2021 09:25 AM    RBC 4.34 10/04/2021 09:25 AM    HGB 11.2 10/13/2021 04:51 AM    HCT 34.1 10/13/2021 04:51 AM    MCV 95.9 10/04/2021 09:25 AM    RDW 12.4 10/04/2021 09:25 AM     10/04/2021 09:25 AM       CMP:   Lab Results   Component Value Date/Time     10/13/2021 04:51 AM    K 4.3 10/13/2021 04:51 AM    K 4.0 10/04/2021 09:25 AM     10/13/2021 04:51 AM    CO2 21 10/13/2021 04:51 AM    BUN 19 10/13/2021 04:51 AM    CREATININE 0.7 10/13/2021 04:51 AM    GFRAA >60 10/13/2021 04:51 AM    LABGLOM >60 10/13/2021 04:51 AM    GLUCOSE 162 10/13/2021 04:51 AM    PROT 7.6 10/04/2021 09:25 AM    CALCIUM 8.0 10/13/2021 04:51 AM    BILITOT 0.4 10/04/2021 09:25 AM    ALKPHOS 170 10/04/2021 09:25 AM    AST 27 10/04/2021 09:25 AM    ALT 27 10/04/2021 09:25 AM       POC Tests: No results for input(s): POCGLU, POCNA, POCK, POCCL, POCBUN, POCHEMO, POCHCT in the last 72 hours.     Coags:   Lab Results   Component Value Date/Time    PROTIME 10.8 10/04/2021 09:25 AM    INR 0.9 10/04/2021 09:25 AM    APTT 29.2 10/04/2021 09:25 AM       HCG (If Applicable): No results found for: PREGTESTUR, PREGSERUM, HCG, HCGQUANT     ABGs: No results found for: PHART, PO2ART, YWC1NAT, SRN0CTA, BEART, E7ZITAQP     Type & Screen (If Applicable):  No results found for: LABABO, LABRH    Drug/Infectious Status (If Applicable):  No results found for: HIV, HEPCAB    COVID-19 Screening (If Applicable):   Lab Results   Component Value Date/Time    COVID19 Not Detected 07/15/2020 12:41 PM           Anesthesia Evaluation  Patient summary reviewed and Nursing notes reviewed   history of anesthetic complications: PONV. Airway: Mallampati: I  TM distance: >3 FB   Neck ROM: full  Mouth opening: > = 3 FB   Dental:    (+) partials      Pulmonary:Negative Pulmonary ROS and normal exam  breath sounds clear to auscultation      (-) not a current smoker (ex smoker)                           Cardiovascular:  Exercise tolerance: good (>4 METS),   (+) hypertension:, hyperlipidemia      ECG reviewed  Rhythm: regular  Rate: normal           Beta Blocker:  Dose within 24 Hrs      ROS comment: EKG= SR      Cleared by PCP     Neuro/Psych:   (+) headaches: migraine headaches,              ROS comment: And after surgery  GI/Hepatic/Renal: Neg GI/Hepatic/Renal ROS       (-) GERD       Endo/Other:    (+) DiabetesType II DM, , hypothyroidism: arthritis: OA., . Pt had PAT visit. Abdominal:             Vascular: negative vascular ROS. Other Findings:             Anesthesia Plan      MAC     ASA 2       Induction: intravenous. MIPS: Postoperative opioids intended and Prophylactic antiemetics administered. Anesthetic plan and risks discussed with patient. Plan discussed with CRNA. Chhaya Mccormick MD   7/19/2022    DOS STAFF ADDENDUM:    Pt seen and examined, chart reviewed (including anesthesia, drug and allergy history). Anesthetic plan, risks, benefits, alternatives, and personnel involved discussed with patient. Patient verbalized an understanding and agrees to proceed. Plan discussed with care team members and agreed upon.     Taurus Jang MD  Staff Anesthesiologist  6:33 AM

## 2022-07-22 ENCOUNTER — HOSPITAL ENCOUNTER (OUTPATIENT)
Age: 72
Setting detail: OUTPATIENT SURGERY
Discharge: HOME | End: 2022-07-22
Attending: PODIATRIST | Admitting: PODIATRIST
Payer: MEDICARE

## 2022-07-22 ENCOUNTER — ANESTHESIA (OUTPATIENT)
Dept: OPERATING ROOM | Age: 72
End: 2022-07-22
Payer: MEDICARE

## 2022-07-22 ENCOUNTER — HOSPITAL ENCOUNTER (OUTPATIENT)
Dept: OPERATING ROOM | Age: 72
Setting detail: OUTPATIENT SURGERY
Discharge: HOME | End: 2022-07-22
Attending: PODIATRIST
Payer: MEDICARE

## 2022-07-22 VITALS
BODY MASS INDEX: 25.39 KG/M2 | HEART RATE: 74 BPM | RESPIRATION RATE: 16 BRPM | SYSTOLIC BLOOD PRESSURE: 110 MMHG | TEMPERATURE: 96.8 F | WEIGHT: 158 LBS | DIASTOLIC BLOOD PRESSURE: 66 MMHG | HEIGHT: 66 IN | OXYGEN SATURATION: 98 %

## 2022-07-22 DIAGNOSIS — M79.671 FOOT PAIN, RIGHT: ICD-10-CM

## 2022-07-22 DIAGNOSIS — M21.621 BUNIONETTE OF RIGHT FOOT: Primary | ICD-10-CM

## 2022-07-22 LAB — METER GLUCOSE: 129 MG/DL (ref 74–99)

## 2022-07-22 PROCEDURE — 3700000000 HC ANESTHESIA ATTENDED CARE: Performed by: PODIATRIST

## 2022-07-22 PROCEDURE — C1713 ANCHOR/SCREW BN/BN,TIS/BN: HCPCS | Performed by: PODIATRIST

## 2022-07-22 PROCEDURE — 7100000010 HC PHASE II RECOVERY - FIRST 15 MIN: Performed by: PODIATRIST

## 2022-07-22 PROCEDURE — 2580000003 HC RX 258: Performed by: ANESTHESIOLOGY

## 2022-07-22 PROCEDURE — 3700000001 HC ADD 15 MINUTES (ANESTHESIA): Performed by: PODIATRIST

## 2022-07-22 PROCEDURE — 3209999900 FLUORO FOR SURGICAL PROCEDURES

## 2022-07-22 PROCEDURE — 82962 GLUCOSE BLOOD TEST: CPT | Performed by: PODIATRIST

## 2022-07-22 PROCEDURE — 2709999900 HC NON-CHARGEABLE SUPPLY: Performed by: PODIATRIST

## 2022-07-22 PROCEDURE — 3600000005 HC SURGERY LEVEL 5 BASE: Performed by: PODIATRIST

## 2022-07-22 PROCEDURE — 2500000003 HC RX 250 WO HCPCS: Performed by: PODIATRIST

## 2022-07-22 PROCEDURE — 3600000015 HC SURGERY LEVEL 5 ADDTL 15MIN: Performed by: PODIATRIST

## 2022-07-22 PROCEDURE — L4360 PNEUMAT WALKING BOOT PRE CST: HCPCS | Performed by: PODIATRIST

## 2022-07-22 PROCEDURE — 2500000003 HC RX 250 WO HCPCS: Performed by: NURSE ANESTHETIST, CERTIFIED REGISTERED

## 2022-07-22 PROCEDURE — 2720000010 HC SURG SUPPLY STERILE: Performed by: PODIATRIST

## 2022-07-22 PROCEDURE — 7100000011 HC PHASE II RECOVERY - ADDTL 15 MIN: Performed by: PODIATRIST

## 2022-07-22 PROCEDURE — 6360000002 HC RX W HCPCS: Performed by: NURSE ANESTHETIST, CERTIFIED REGISTERED

## 2022-07-22 PROCEDURE — 82962 GLUCOSE BLOOD TEST: CPT

## 2022-07-22 DEVICE — IMPLANTABLE DEVICE
Type: IMPLANTABLE DEVICE | Site: FOOT | Status: FUNCTIONAL
Brand: PROSTEP™ MIS BUNIONETTE

## 2022-07-22 RX ORDER — LORAZEPAM 2 MG/ML
0.5 INJECTION INTRAMUSCULAR
Status: CANCELLED | OUTPATIENT
Start: 2022-07-22 | End: 2022-07-22

## 2022-07-22 RX ORDER — SODIUM CHLORIDE, SODIUM LACTATE, POTASSIUM CHLORIDE, CALCIUM CHLORIDE 600; 310; 30; 20 MG/100ML; MG/100ML; MG/100ML; MG/100ML
INJECTION, SOLUTION INTRAVENOUS CONTINUOUS
Status: DISCONTINUED | OUTPATIENT
Start: 2022-07-22 | End: 2022-07-22 | Stop reason: HOSPADM

## 2022-07-22 RX ORDER — DIPHENHYDRAMINE HYDROCHLORIDE 50 MG/ML
INJECTION INTRAMUSCULAR; INTRAVENOUS PRN
Status: DISCONTINUED | OUTPATIENT
Start: 2022-07-22 | End: 2022-07-22 | Stop reason: SDUPTHER

## 2022-07-22 RX ORDER — MIDAZOLAM HYDROCHLORIDE 1 MG/ML
INJECTION INTRAMUSCULAR; INTRAVENOUS PRN
Status: DISCONTINUED | OUTPATIENT
Start: 2022-07-22 | End: 2022-07-22 | Stop reason: SDUPTHER

## 2022-07-22 RX ORDER — LABETALOL HYDROCHLORIDE 5 MG/ML
10 INJECTION, SOLUTION INTRAVENOUS
Status: CANCELLED | OUTPATIENT
Start: 2022-07-22

## 2022-07-22 RX ORDER — FENTANYL CITRATE 50 UG/ML
INJECTION, SOLUTION INTRAMUSCULAR; INTRAVENOUS PRN
Status: DISCONTINUED | OUTPATIENT
Start: 2022-07-22 | End: 2022-07-22 | Stop reason: SDUPTHER

## 2022-07-22 RX ORDER — CLINDAMYCIN PHOSPHATE 900 MG/50ML
900 INJECTION INTRAVENOUS ONCE
Status: COMPLETED | OUTPATIENT
Start: 2022-07-22 | End: 2022-07-22

## 2022-07-22 RX ORDER — HYDROMORPHONE HYDROCHLORIDE 1 MG/ML
0.5 INJECTION, SOLUTION INTRAMUSCULAR; INTRAVENOUS; SUBCUTANEOUS EVERY 5 MIN PRN
Status: CANCELLED | OUTPATIENT
Start: 2022-07-22

## 2022-07-22 RX ORDER — GLYCOPYRROLATE 0.2 MG/ML
INJECTION INTRAMUSCULAR; INTRAVENOUS PRN
Status: DISCONTINUED | OUTPATIENT
Start: 2022-07-22 | End: 2022-07-22 | Stop reason: SDUPTHER

## 2022-07-22 RX ORDER — OXYCODONE HYDROCHLORIDE AND ACETAMINOPHEN 5; 325 MG/1; MG/1
1 TABLET ORAL EVERY 4 HOURS PRN
Qty: 28 TABLET | Refills: 0 | Status: SHIPPED | OUTPATIENT
Start: 2022-07-22 | End: 2022-07-29

## 2022-07-22 RX ORDER — IPRATROPIUM BROMIDE AND ALBUTEROL SULFATE 2.5; .5 MG/3ML; MG/3ML
1 SOLUTION RESPIRATORY (INHALATION)
Status: CANCELLED | OUTPATIENT
Start: 2022-07-22 | End: 2022-07-22

## 2022-07-22 RX ORDER — ONDANSETRON 2 MG/ML
INJECTION INTRAMUSCULAR; INTRAVENOUS PRN
Status: DISCONTINUED | OUTPATIENT
Start: 2022-07-22 | End: 2022-07-22 | Stop reason: SDUPTHER

## 2022-07-22 RX ORDER — MEPERIDINE HYDROCHLORIDE 25 MG/ML
12.5 INJECTION INTRAMUSCULAR; INTRAVENOUS; SUBCUTANEOUS EVERY 5 MIN PRN
Status: CANCELLED | OUTPATIENT
Start: 2022-07-22

## 2022-07-22 RX ORDER — MORPHINE SULFATE 2 MG/ML
2 INJECTION, SOLUTION INTRAMUSCULAR; INTRAVENOUS EVERY 5 MIN PRN
Status: CANCELLED | OUTPATIENT
Start: 2022-07-22

## 2022-07-22 RX ORDER — ONDANSETRON 2 MG/ML
4 INJECTION INTRAMUSCULAR; INTRAVENOUS
Status: CANCELLED | OUTPATIENT
Start: 2022-07-22 | End: 2022-07-22

## 2022-07-22 RX ORDER — DIPHENHYDRAMINE HYDROCHLORIDE 50 MG/ML
12.5 INJECTION INTRAMUSCULAR; INTRAVENOUS
Status: CANCELLED | OUTPATIENT
Start: 2022-07-22 | End: 2022-07-22

## 2022-07-22 RX ORDER — CLINDAMYCIN PHOSPHATE 600 MG/50ML
600 INJECTION INTRAVENOUS ONCE
Status: DISCONTINUED | OUTPATIENT
Start: 2022-07-22 | End: 2022-07-22 | Stop reason: CLARIF

## 2022-07-22 RX ORDER — PROPOFOL 10 MG/ML
INJECTION, EMULSION INTRAVENOUS PRN
Status: DISCONTINUED | OUTPATIENT
Start: 2022-07-22 | End: 2022-07-22 | Stop reason: SDUPTHER

## 2022-07-22 RX ORDER — PROCHLORPERAZINE EDISYLATE 5 MG/ML
5 INJECTION INTRAMUSCULAR; INTRAVENOUS
Status: CANCELLED | OUTPATIENT
Start: 2022-07-22 | End: 2022-07-22

## 2022-07-22 RX ORDER — HYDRALAZINE HYDROCHLORIDE 20 MG/ML
10 INJECTION INTRAMUSCULAR; INTRAVENOUS
Status: CANCELLED | OUTPATIENT
Start: 2022-07-22

## 2022-07-22 RX ADMIN — DIPHENHYDRAMINE HYDROCHLORIDE 12.5 MG: 50 INJECTION, SOLUTION INTRAMUSCULAR; INTRAVENOUS at 07:42

## 2022-07-22 RX ADMIN — PROPOFOL INJECTABLE EMULSION 50 MG: 10 INJECTION, EMULSION INTRAVENOUS at 07:05

## 2022-07-22 RX ADMIN — FENTANYL CITRATE 50 MCG: 50 INJECTION INTRAMUSCULAR; INTRAVENOUS at 07:05

## 2022-07-22 RX ADMIN — FENTANYL CITRATE 25 MCG: 50 INJECTION INTRAMUSCULAR; INTRAVENOUS at 07:09

## 2022-07-22 RX ADMIN — FENTANYL CITRATE 25 MCG: 50 INJECTION INTRAMUSCULAR; INTRAVENOUS at 07:18

## 2022-07-22 RX ADMIN — CLINDAMYCIN PHOSPHATE 900 MG: 900 INJECTION, SOLUTION INTRAVENOUS at 07:00

## 2022-07-22 RX ADMIN — GLYCOPYRROLATE 0.2 MG: 0.2 INJECTION INTRAMUSCULAR; INTRAVENOUS at 07:33

## 2022-07-22 RX ADMIN — PROPOFOL INJECTABLE EMULSION 75 MCG/KG/MIN: 10 INJECTION, EMULSION INTRAVENOUS at 07:06

## 2022-07-22 RX ADMIN — ONDANSETRON 4 MG: 2 INJECTION INTRAMUSCULAR; INTRAVENOUS at 07:45

## 2022-07-22 RX ADMIN — MIDAZOLAM 2 MG: 1 INJECTION INTRAMUSCULAR; INTRAVENOUS at 07:00

## 2022-07-22 RX ADMIN — SODIUM CHLORIDE, POTASSIUM CHLORIDE, SODIUM LACTATE AND CALCIUM CHLORIDE: 600; 310; 30; 20 INJECTION, SOLUTION INTRAVENOUS at 06:38

## 2022-07-22 ASSESSMENT — PAIN SCALES - GENERAL
PAINLEVEL_OUTOF10: 0
PAINLEVEL_OUTOF10: 0

## 2022-07-22 ASSESSMENT — PAIN - FUNCTIONAL ASSESSMENT: PAIN_FUNCTIONAL_ASSESSMENT: 0-10

## 2022-07-22 NOTE — DISCHARGE INSTRUCTIONS
General Post-Op Instructions  HILARY Messina, D.P.M.  [phone]    Post Operative Footcare Instructions:    1. Keep your dressings clean and dry. A clean dressing promotes healthy healing. 2.   DO NOT remove the dressing from your foot/ankle, unless instructed by your doctor. 3.   Elevate your foot above the level of your heart for the first 3-4 days as much as possible. 4.   Place ice on top of your foot/ankle 15 min. every waking hour for the first 3-4 days. 5.   Take your pain medication, with food,as prescribed. (follow all instructions regarding prescription medications)  6. Wear your surgical shoe at all times, and even to bed the first 3-4 days post-op. 7.   DO NOT take a shower and get your foot wet. 8.   You may take a sponge bath or hang you foot over the side of the bath tub.  9.  Take your temperature daily each morning, and call the doctor if your temp. is higher than                            102.5. 10.  You may apply partial weight and walk on your foot as tolerated. 11.  Resume normal diet and medications (unless otherwise instructed by your doctor). 12.  No driving until approved by Dr. Arturo Ayala  13. You should have a responsible adult with you for at 24 hrs. ************************************************************************************    1.   Call the office at [phone] to make an appointment. 2.   Your first post-op appointment should be made tomorrow for within 1 week of the surgery. 3.  To contact the doctor, you may do so by calling [phone] or [phone].   4.   Take it easy for the next few days and remember, If you are good to your foot, it will be                                    good to you.         If any problems occur or if you have any further questions, please call your doctor as soon as possible. If you find that you cannot reach your doctor but feel that your condition needs a doctors attention go to an emergency room. Infection After Surgery: Care Instructions  Overview  After surgery, an infection is always possible. It doesn't mean that thesurgery didn't go well. Because an infection can be serious, your doctor has taken steps to manage it. Your doctor checked the infection and cleaned it if necessary. Your doctor may have made an opening in the area so that the pus can drain out. You may have gauze in the cut so that the area will stay open and keep draining. You mayneed antibiotics. You will need to follow up with your doctor to make sure the infection has goneaway. Follow-up care is a key part of your treatment and safety. Be sure to make and go to all appointments, and call your doctor if you are having problems. It's also a good idea to know your test results and keep alist of the medicines you take. How can you care for yourself at home? Make sure your surgeon knows about the infection, especially if you saw another doctor about your symptoms. If your doctor prescribed antibiotics, take them as directed. Do not stop taking them just because you feel better. You need to take the full course of antibiotics. Ask your doctor if you can take an over-the-counter pain medicine, such as acetaminophen (Tylenol), ibuprofen (Advil, Motrin), or naproxen (Aleve). Be safe with medicines. Read and follow all instructions on the label. Do not take two or more pain medicines at the same time unless the doctor told you to. Many pain medicines have acetaminophen, which is Tylenol. Too much acetaminophen (Tylenol) can be harmful. Prop up the area on a pillow anytime you sit or lie down during the next 3 days. Try to keep it above the level of your heart. This will help reduce swelling. Keep the skin clean and dry. You may have a bandage over the cut (incision). A bandage helps the incision heal and protects it. Your doctor will tell you how to take care of this. Keep it clean and dry. You may have drainage from the wound.   If your doctor told you how to care for your incision, follow your doctor's instructions. If you did not get instructions, follow this general advice:  Wash around the incision with clean water 2 times a day. Don't use hydrogen peroxide or alcohol, which can slow healing. When should you call for help? Call your doctor now or seek immediate medical care if:    You have signs that your infection is getting worse, such as: Increased pain, swelling, warmth, or redness in the area. Red streaks leading from the area. Pus draining from the wound. A new or higher fever. Watch closely for changes in your health, and be sure to contact your doctor ifyou have any problems. Nausea and Vomiting After Surgery: Care Instructions  Your Care Instructions     After you've had surgery, you may feel sick to your stomach (nauseated) or you may vomit. Sometimes anesthesia can make you feel sick. It's a common side effect and often doesn't last long. Pain also can make you feel sick or vomit. After the anesthesia wears off, you may feel pain from the incision (cut). That pain could then upset your stomach. Taking pain medicine can also make you feelsick to your stomach. Whatever the cause, you may get medicine that can help. There are also somethings you can do at home to prevent nausea and feel better. The doctor has checked you carefully, but problems can develop later. If you notice any problems or new symptoms, get medical treatment right away. Follow-up care is a key part of your treatment and safety. Be sure to make and go to all appointments, and call your doctor if you are having problems. It's also a good idea to know your test results and keep alist of the medicines you take. How can you care for yourself at home? Be safe with medicines. Read and follow all instructions on the label. If the doctor gave you a prescription medicine for pain, take it as prescribed.   If you are not taking a prescription pain medicine, ask your doctor if you can take an over-the-counter medicine. Take your pain medicine as soon as you have pain. It works better if you take it before the pain gets bad. Call your doctor if you have any problems with your medicine. Rest in bed until you feel better. To prevent dehydration, drink plenty of fluids. Choose water and other clear liquids until you feel better. If you have kidney, heart, or liver disease and have to limit fluids, talk with your doctor before you increase the amount of fluids you drink. When you are able to eat, try clear soups, mild foods, and liquids until all symptoms are gone for 12 to 48 hours. Other good choices include dry toast, crackers, cooked cereal, and gelatin dessert, such as Jell-O. Do not smoke. Smoking and being around smoke can make nausea worse. If you need help quitting, talk to your doctor about stop-smoking programs and medicines. These can increase your chances of quitting for good. When should you call for help? Call 911  anytime you think you may need emergency care. For example, call if:    You passed out (lost consciousness). Call your doctor now or seek immediate medical care if:    You have new or worse nausea or vomiting. You are too sick to your stomach to drink any fluids. You cannot keep down fluids. You have symptoms of dehydration, such as:  Dry eyes and a dry mouth. Passing only a little urine. Feeling thirstier than usual.     Your pain medicine is not helping. You are dizzy or lightheaded, or you feel like you may faint. Watch closely for changes in your health, and be sure to contact your doctor if:    You do not get better as expected.

## 2022-07-22 NOTE — ANESTHESIA POSTPROCEDURE EVALUATION
Department of Anesthesiology  Postprocedure Note    Patient: Warnell Duverney  MRN: [id number]  YOB: 1950  Date of evaluation: 7/22/2022      Procedure Summary     Date: 07/22/22 Room / Location: [address] [place] [address] 03 / [facility] [facility]    Anesthesia Start: 0700 Anesthesia Stop: 2776    Procedure: TAILOR'S BUNIONECTOMY RIGHT FOOT (Right: Foot) Diagnosis:       Bunion of right foot      (Bunion of right foot [M21.611])    Surgeons: Jocelynn Lopez DPM Responsible Provider: Sidney Mcdonald MD    Anesthesia Type: MAC ASA Status: 2          Anesthesia Type: No value filed.     Melania Phase I: Melania Score: 10    Melania Phase II: Melania Score: 10      Anesthesia Post Evaluation    Patient location during evaluation: PACU  Patient participation: complete - patient participated  Level of consciousness: awake  Airway patency: patent  Nausea & Vomiting: no nausea and no vomiting  Complications: no  Cardiovascular status: hemodynamically stable  Respiratory status: acceptable  Hydration status: euvolemic

## 2022-07-22 NOTE — H&P
Update History & Physical    The patient's History and Physical of 7 / 22 / 2022 was reviewed with the patient and there were no significant changes. I examined the patient and there were no significant changes from the previous History and Physical.  Blood pressure (!) 144/63, pulse 65, temperature 97 °F (36.1 °C), temperature source Skin, resp. rate 16, height 5' 6\" (1.676 m), weight 158 lb (71.7 kg), SpO2 96 %. Plan: The risk, benefits, expected outcome, and alternative to the recommended procedure have been discussed with the patient. Patient understands and wants to proceed with the procedure.     Electronically signed by Jayshree Fisher DPM on 7/22/22 at 6:54 AM RYAN Caicedo DPM   Board Certified Foot and Ankle Surgeon  Office: [phone]  Cell:  [phone]

## 2022-07-22 NOTE — OP NOTE
Operative Note      Patient: Cornelio Aleman  YOB: 1950  MRN: [id number]    Date of Procedure: 7/22/2022    Pre-Op Diagnosis: Bunion of right foot [M21.611]    Post-Op Diagnosis: Same       Procedure(s):  TAILOR'S BUNIONECTOMY RIGHT FOOT    Surgeon(s):  Wang Baker DPM    Assistant:   Resident: Lavern Boyd DPM    Anesthesia: Monitor Anesthesia Care    Estimated Blood Loss (mL): Minimal    Complications: None    Specimens:   * No specimens in log *    Implants:  Implant Name Type Inv. Item Serial No.  Lot No. LRB No. Used Action   IMPLANT [employer] - [id number]  IMPLANT TAILORS [employer] [facility] [employer] [id number] Right 1 Implanted         Drains: * No LDAs found *    Findings: Consistent with diagnosis. Patient had a previous time tailor's bunionectomy. Bone soft. Patient presented into the operative room and placed in the operative table in the supine position. Patiently with IV sedation by department anesthesia. Once sedated the patient's operative foot was localized by the surgeon comprised of an equal mixture of 0.5% Marcaine plain along with 2% lidocaine plain totaling 10 cc after which time the patient's foot was repaired scrubbed and draped in a sterile fashion. Tourniquet utilized at a pressure of 200 mmHg pressure for the duration of the procedure which was approximately 20 minutes    Attention first directed to the lateral aspect the right foot where after the timeout was completed incision was made over the fifth metatarsal phalangeal joint incision was deepened sharp dissection at this point periosteum was visualized we did use a C arm to help noelle the cut area once the cut guide was placed the through and through osteotomy was was then completed. Once this was completed the appropriate size implant was placed in the osteotomy completed and fixated.     Once fixated confirmed by x-ray the surgical site was flushed with sterile saline. The deep structures then reapproximated 3-0 Vicryl. Subcu 3-0 Monocryl. And the skin was then reapproximated 4-0 nylon the foot was then cleansed and a dry sterile dressing applied after the tourniquet was deflated.     The bone was soft so therefore the patient was given a pneumatic walking cast boot versus a surgical shoe    It should be noted at this time the patient tolerated the surgery anesthesia well no complications prognosis is good patient left operating with recovery for postop care management and will be seen in the office 1 week for postop visit    Detailed Description of Procedure:   above    Electronically signed by Anupama Cook DPM on 7/22/2022 at 7:47 AM  Dillon Bartlett DPM   Board Certified Foot and Ankle Surgeon  Office: [phone]  Cell:  [phone]

## 2022-07-25 ENCOUNTER — OFFICE VISIT (OUTPATIENT)
Dept: ORTHOPEDIC SURGERY | Age: 72
End: 2022-07-25

## 2022-07-25 VITALS — TEMPERATURE: 98 F | WEIGHT: 158 LBS | BODY MASS INDEX: 25.39 KG/M2 | HEIGHT: 66 IN

## 2022-07-25 DIAGNOSIS — M65.332 TRIGGER FINGER, LEFT MIDDLE FINGER: Primary | ICD-10-CM

## 2022-07-25 PROCEDURE — 99024 POSTOP FOLLOW-UP VISIT: CPT | Performed by: ORTHOPAEDIC SURGERY

## 2022-07-25 NOTE — PROGRESS NOTES
Chief Complaint:   Chief Complaint   Patient presents with    Follow-up     Left trigger finger surgery 7/14/22 no pain has flexion and extension of finger       Cole Tovar is up 11 days postop flexor tenovaginotomy left ring finger. She is doing well. Fingers moving well is not catching anymore. She is tired of the dressing. Allergies; medications; past medical, surgical, family, and social history; and problem list have been reviewed today and updated as indicated in this encounter seen below. Exam: The dressing was removed. The wounds well approximated with sutures in place. These were removed without incident. Her finger range of motion is good. There is only mild edema. Radiographs: None    ASSESSMENT:    Toy Chandler was seen today for follow-up. Diagnoses and all orders for this visit:    Trigger finger, left middle finger        PLAN: Light dressing to protect the wound for at least a week and a half. Gradually increase activities and definitely concentrate on range of motion exercises. We will follow-up on an as-needed basis. Return if symptoms worsen or fail to improve. Current Outpatient Medications   Medication Sig Dispense Refill    oxyCODONE-acetaminophen (PERCOCET) 5-325 MG per tablet Take 1 tablet by mouth every 4 hours as needed for Pain for up to 7 days. Intended supply: 7 days.  Take lowest dose possible to manage pain 28 tablet 0    amLODIPine (NORVASC) 5 MG tablet Take 5 mg by mouth daily AM      levothyroxine (SYNTHROID) 75 MCG tablet Take 75 mcg by mouth Daily       metoprolol succinate (TOPROL XL) 25 MG extended release tablet Take 25 mg by mouth in the morning and at bedtime       metoprolol tartrate (LOPRESSOR) 25 MG tablet 25 mg 2 times daily       losartan (COZAAR) 50 MG tablet Take 50 mg by mouth daily AM      metFORMIN (GLUCOPHAGE) 500 MG tablet Take 500 mg by mouth 2 times daily (with meals)       alendronate (FOSAMAX) 70 MG tablet 70 mg every 7 days SUMAtriptan (IMITREX) 50 MG tablet once as needed        No current facility-administered medications for this visit. Patient Active Problem List   Diagnosis    Arthritis of left knee    Hypertension    Hyperlipidemia    Thyroid disease    Diabetes mellitus ([address]    Trigger finger, left middle finger    Bunionette of right foot       Past Medical History:   Diagnosis Date    Arthritis     Diabetes mellitus ([address]     Hyperlipidemia     Hypertension     PONV (postoperative nausea and vomiting)     with anesthesia    Spinal headache     Thyroid disease        Past Surgical History:   Procedure Laterality Date    BLADDER SUSPENSION  2016    BUNIONECTOMY Right 7/22/2022    TAILOR'S BUNIONECTOMY RIGHT FOOT performed by Mcihell Blair DPM at [address] [facility] Right 6/30/2022    TENDON SHEATH EXCISION RIGHT INDEX FINGER, TRIGGER RELEASE RIGHT INDEX performed by Boo Bailey DO at [address] [facility] Left 7/14/2022    TENDON SHEATH RELEASE, TRIGGER FINGER, LEFT MIDDLE performed by Boo Bailey DO at [address] Bilateral     bunions, hammer toes      FOOT SURGERY Right 8/17/2021    CORRECTION ANGULAR DEFORMITY RIGHT 5TH DIGIT, PARTIAL EXCISION OF BONE RIGHT 5TH DIGIT (CPT 74086) performed by Samuel Hamm DPM at [address] ([address] [place] [address])  02/1999    JOINT REPLACEMENT Right 05/2018    done in [place]   knee    TOTAL KNEE ARTHROPLASTY Left 10/12/2021    TOTAL LEFT KNEE REPLACEMENT ARTHROPLASTY (CE) performed by Boo Bailey DO at [address] [facility]   Allergen Reactions    Penicillins Hives    Sulfa Antibiotics Other (See Comments)     Weird feeling  Felt sick    Oxycodone Nausea And Vomiting       Social History     Socioeconomic History    Marital status:       Spouse name: None    Number of children: None    Years of education: None    Highest education level: None Tobacco Use    Smoking status: Former     Years: 35.00     Types: Cigarettes     Quit date:      Years since quittin.5    Smokeless tobacco: Never   Vaping Use    Vaping Use: Never used   Substance and Sexual Activity    Alcohol use: Never    Drug use: Never       Review of Systems  As follows except as previously noted in HPI:  Constitutional: Negative for chills, diaphoresis, fatigue, fever and unexpected weight change. Respiratory: Negative for cough, shortness of breath and wheezing. Cardiovascular: Negative for chest pain and palpitations. Neurological: Negative for dizziness, syncope, cephalgia. GI / : negative  Musculoskeletal: see HPI       Objective:   Physical Exam   Constitutional: Oriented to person, place, and time. and appears well-developed and well-nourished. :   Head: Normocephalic and atraumatic. Eyes: EOM are normal.   Neck: Neck supple. Cardiovascular: Normal rate and regular rhythm. Pulmonary/Chest: Effort normal. No stridor. No respiratory distress, no wheezes. Abdominal:  No abnormal distension. Neurological: Alert and oriented to person, place, and time. Skin: Skin is warm and dry. Psychiatric: Normal mood and affect.  Behavior is normal. Thought content normal.    DONYA Lamb, DO    22  10:11 AM

## 2022-10-18 DIAGNOSIS — Z96.652 S/P TKR (TOTAL KNEE REPLACEMENT) USING CEMENT, LEFT: Primary | ICD-10-CM

## 2022-10-26 DIAGNOSIS — Z96.652 S/P TKR (TOTAL KNEE REPLACEMENT) USING CEMENT, LEFT: Primary | ICD-10-CM

## 2022-10-28 ENCOUNTER — OFFICE VISIT (OUTPATIENT)
Dept: ORTHOPEDIC SURGERY | Age: 72
End: 2022-10-28
Payer: MEDICARE

## 2022-10-28 VITALS — BODY MASS INDEX: 25.71 KG/M2 | WEIGHT: 160 LBS | HEIGHT: 66 IN | TEMPERATURE: 98 F

## 2022-10-28 DIAGNOSIS — Z96.652 S/P TKR (TOTAL KNEE REPLACEMENT) USING CEMENT, LEFT: Primary | ICD-10-CM

## 2022-10-28 PROCEDURE — 99213 OFFICE O/P EST LOW 20 MIN: CPT | Performed by: ORTHOPAEDIC SURGERY

## 2022-10-28 PROCEDURE — G8417 CALC BMI ABV UP PARAM F/U: HCPCS | Performed by: ORTHOPAEDIC SURGERY

## 2022-10-28 PROCEDURE — 1123F ACP DISCUSS/DSCN MKR DOCD: CPT | Performed by: ORTHOPAEDIC SURGERY

## 2022-10-28 PROCEDURE — 1036F TOBACCO NON-USER: CPT | Performed by: ORTHOPAEDIC SURGERY

## 2022-10-28 PROCEDURE — 1090F PRES/ABSN URINE INCON ASSESS: CPT | Performed by: ORTHOPAEDIC SURGERY

## 2022-10-28 PROCEDURE — 3017F COLORECTAL CA SCREEN DOC REV: CPT | Performed by: ORTHOPAEDIC SURGERY

## 2022-10-28 PROCEDURE — G8399 PT W/DXA RESULTS DOCUMENT: HCPCS | Performed by: ORTHOPAEDIC SURGERY

## 2022-10-28 PROCEDURE — G8427 DOCREV CUR MEDS BY ELIG CLIN: HCPCS | Performed by: ORTHOPAEDIC SURGERY

## 2022-10-28 PROCEDURE — G8484 FLU IMMUNIZE NO ADMIN: HCPCS | Performed by: ORTHOPAEDIC SURGERY

## 2022-10-28 NOTE — PROGRESS NOTES
Chief Complaint:   Chief Complaint   Patient presents with    Knee Pain     F/u left TKA DOS: 10/12/21       Luther Jason follows here postop left total knee replacement arthroplasty. She is doing well overall with activity. She gets some discomfort in the lateral collateral area with figure 4 or with certain other activities. Overall she is doing very well is happy with results. Allergies; medications; past medical, surgical, family, and social history; and problem list have been reviewed today and updated as indicated in this encounter seen below. Exam: The incisions well-healed. Alignment of the knee is good. She has good stable collaterals. She has good anterior posterior dynamic stability with range of motion of 0 to 110 degrees of flexion. Patellofemoral alignment is good. There is only mild tenderness in the lateral collateral area with no instability and no crepitus. Radiographs: Imaging of the left knee and 2 view shows a stable cemented left total knee replacement arthroplasty in good alignment. ASSESSMENT:    Eve Ward was seen today for knee pain. Diagnoses and all orders for this visit:    S/P TKR (total knee replacement) using cement, left        PLAN: Continue with activity as tolerated. We discussed surgical precautions regarding antibiotics. We will follow as needed. Return if symptoms worsen or fail to improve.        Current Outpatient Medications   Medication Sig Dispense Refill    amLODIPine (NORVASC) 5 MG tablet Take 5 mg by mouth daily AM      levothyroxine (SYNTHROID) 75 MCG tablet Take 75 mcg by mouth Daily       metoprolol succinate (TOPROL XL) 25 MG extended release tablet Take 25 mg by mouth in the morning and at bedtime       metoprolol tartrate (LOPRESSOR) 25 MG tablet 25 mg 2 times daily       losartan (COZAAR) 50 MG tablet Take 50 mg by mouth daily AM      metFORMIN (GLUCOPHAGE) 500 MG tablet Take 500 mg by mouth 2 times daily (with meals) alendronate (FOSAMAX) 70 MG tablet 70 mg every 7 days       SUMAtriptan (IMITREX) 50 MG tablet once as needed        No current facility-administered medications for this visit. Patient Active Problem List   Diagnosis    Arthritis of left knee    Hypertension    Hyperlipidemia    Thyroid disease    Diabetes mellitus ([address]    Trigger finger, left middle finger    Bunionette of right foot       Past Medical History:   Diagnosis Date    Arthritis     Diabetes mellitus ([address]     Hyperlipidemia     Hypertension     PONV (postoperative nausea and vomiting)     with anesthesia    Spinal headache     Thyroid disease        Past Surgical History:   Procedure Laterality Date    BLADDER SUSPENSION  2016    BUNIONECTOMY Right 7/22/2022    TAILOR'S BUNIONECTOMY RIGHT FOOT performed by Darell Morales DPM at [address] [facility] Right 6/30/2022    TENDON SHEATH EXCISION RIGHT INDEX FINGER, TRIGGER RELEASE RIGHT INDEX performed by Colin Lloyd DO at [address] [facility] [address] Left 7/14/2022    TENDON SHEATH RELEASE, TRIGGER FINGER, LEFT MIDDLE performed by Colin Lloyd DO at [address] Bilateral     bunions, hammer toes      FOOT SURGERY Right 8/17/2021    CORRECTION ANGULAR DEFORMITY RIGHT 5TH DIGIT, PARTIAL EXCISION OF BONE RIGHT 5TH DIGIT (CPT 63149) performed by Alexsandra Romo DPM at [address] ([address] [facility])  02/1999    JOINT REPLACEMENT Right 05/2018    done in [place]   knee    TOTAL KNEE ARTHROPLASTY Left 10/12/2021    TOTAL LEFT KNEE REPLACEMENT ARTHROPLASTY (CE) performed by Colin Lloyd DO at [address] [facility]   Allergen Reactions    Penicillins Hives    Sulfa Antibiotics Other (See Comments)     Weird feeling  Felt sick    Oxycodone Nausea And Vomiting       Social History     Socioeconomic History    Marital status:       Spouse name: None    Number of children: None Years of education: None    Highest education level: None   Tobacco Use    Smoking status: Former     Years: 35.00     Types: Cigarettes     Quit date:      Years since quittin.8    Smokeless tobacco: Never   Vaping Use    Vaping Use: Never used   Substance and Sexual Activity    Alcohol use: Never    Drug use: Never       Review of Systems  As follows except as previously noted in HPI:  Constitutional: Negative for chills, diaphoresis, fatigue, fever and unexpected weight change. Respiratory: Negative for cough, shortness of breath and wheezing. Cardiovascular: Negative for chest pain and palpitations. Neurological: Negative for dizziness, syncope, cephalgia. GI / : negative  Musculoskeletal: see HPI       Objective:   Physical Exam   Constitutional: Oriented to person, place, and time. and appears well-developed and well-nourished. :   Head: Normocephalic and atraumatic. Eyes: EOM are normal.   Neck: Neck supple. Cardiovascular: Normal rate and regular rhythm. Pulmonary/Chest: Effort normal. No stridor. No respiratory distress, no wheezes. Abdominal:  No abnormal distension. Neurological: Alert and oriented to person, place, and time. Skin: Skin is warm and dry. Psychiatric: Normal mood and affect.  Behavior is normal. Thought content normal.    K Caryle Kitmauro, DO    10/28/22  11:00 AM

## 2023-01-30 ENCOUNTER — OFFICE VISIT (OUTPATIENT)
Dept: ORTHOPEDIC SURGERY | Age: 73
End: 2023-01-30
Payer: MEDICARE

## 2023-01-30 VITALS — TEMPERATURE: 98 F | WEIGHT: 160 LBS | BODY MASS INDEX: 25.71 KG/M2 | HEIGHT: 66 IN

## 2023-01-30 DIAGNOSIS — M65.341 TRIGGER FINGER, RIGHT RING FINGER: ICD-10-CM

## 2023-01-30 DIAGNOSIS — M65.331 TRIGGER FINGER, RIGHT MIDDLE FINGER: Primary | ICD-10-CM

## 2023-01-30 PROCEDURE — 99213 OFFICE O/P EST LOW 20 MIN: CPT | Performed by: ORTHOPAEDIC SURGERY

## 2023-01-30 PROCEDURE — 1036F TOBACCO NON-USER: CPT | Performed by: ORTHOPAEDIC SURGERY

## 2023-01-30 PROCEDURE — G8399 PT W/DXA RESULTS DOCUMENT: HCPCS | Performed by: ORTHOPAEDIC SURGERY

## 2023-01-30 PROCEDURE — G8484 FLU IMMUNIZE NO ADMIN: HCPCS | Performed by: ORTHOPAEDIC SURGERY

## 2023-01-30 PROCEDURE — 20550 NJX 1 TENDON SHEATH/LIGAMENT: CPT | Performed by: ORTHOPAEDIC SURGERY

## 2023-01-30 PROCEDURE — G8427 DOCREV CUR MEDS BY ELIG CLIN: HCPCS | Performed by: ORTHOPAEDIC SURGERY

## 2023-01-30 PROCEDURE — 3017F COLORECTAL CA SCREEN DOC REV: CPT | Performed by: ORTHOPAEDIC SURGERY

## 2023-01-30 PROCEDURE — 1123F ACP DISCUSS/DSCN MKR DOCD: CPT | Performed by: ORTHOPAEDIC SURGERY

## 2023-01-30 PROCEDURE — 1090F PRES/ABSN URINE INCON ASSESS: CPT | Performed by: ORTHOPAEDIC SURGERY

## 2023-01-30 PROCEDURE — G8417 CALC BMI ABV UP PARAM F/U: HCPCS | Performed by: ORTHOPAEDIC SURGERY

## 2023-01-30 NOTE — PROGRESS NOTES
Sharad Complaint:   Chief Complaint   Patient presents with    Hand Pain     Est patient new problem Right hand middle and ring finger trigger fingers for 2 wks. Conhca Malik sees us today for pain in her right hand. This primarily in the right middle and ring fingers. She has pain in the at the base of the fingers. She has difficulty with movement of these fingers and pain in the morning when she wakes up with them bent and she has difficulty straightening them. They pop as well there is tenderness palpation in the palm the distal palmar crease. He had numerous other trigger fingers which were released surgically and did well. She would like injections for these today. Allergies; medications; past medical, surgical, family, and social history; and problem list have been reviewed today and updated as indicated in this encounter seen below. Exam: Skin condition gross neurovascular functions good in right upper extremity. Shoulder elbow wrist and hand motion are good with the exception of right middle and ring fingers. She has limited flexion and extension and guards these. Alignment is good. There is pain on palpation volarly over the A1 bands where she has some prominence of flexor tendons particularly with motion of the fingers. She has slight contracture PIP joints from chronic positioning. Radiographs: None    ASSESSMENT:    Shahab Back was seen today for hand pain. Diagnoses and all orders for this visit:    Trigger finger, right middle finger  -     NE INJECTION 1 TENDON SHEATH/LIGAMENT APONEUROSIS    Trigger finger, right ring finger  -     NE INJECTION 1 TENDON SHEATH/LIGAMENT APONEUROSIS    Other orders  -     triamcinolone acetonide (KENALOG) injection 10 mg  -     triamcinolone acetonide (KENALOG) injection 10 mg      PLAN: Injectionof the flexor sheath right ring finger with Kenalog   (triamcinalone)         10mg and 1/2 cc 1/4 % bupivicainewas discussed with the patient. Discussion included but was not limited to potential risks and benefits. No contraindications to the procedure were noted. Understanding and agreement was indicated. The procedure was accomplished without incident using appropriate aseptic technique. follow up as needed    Injectionof the Lexer sheath right middle finger with Kenalog   (triamcinalone)         10mg and 1/2 cc 1/4 % bupivicainewas discussed with the patient. Discussion included but was not limited to potential risks and benefits. No contraindications to the procedure were noted. Understanding and agreement was indicated. The procedure was accomplished without incident using appropriate aseptic technique. Return if symptoms worsen or fail to improve. Current Outpatient Medications   Medication Sig Dispense Refill    amLODIPine (NORVASC) 5 MG tablet Take 5 mg by mouth daily AM      levothyroxine (SYNTHROID) 75 MCG tablet Take 75 mcg by mouth Daily       metoprolol succinate (TOPROL XL) 25 MG extended release tablet Take 25 mg by mouth in the morning and at bedtime       metoprolol tartrate (LOPRESSOR) 25 MG tablet 25 mg 2 times daily       losartan (COZAAR) 50 MG tablet Take 50 mg by mouth daily AM      metFORMIN (GLUCOPHAGE) 500 MG tablet Take 500 mg by mouth 2 times daily (with meals)       alendronate (FOSAMAX) 70 MG tablet 70 mg every 7 days       SUMAtriptan (IMITREX) 50 MG tablet once as needed        No current facility-administered medications for this visit.        Patient Active Problem List   Diagnosis    Arthritis of left knee    Hypertension    Hyperlipidemia    Thyroid disease    Diabetes mellitus (HCC)    Trigger finger, left middle finger    Bunionette of right foot       Past Medical History:   Diagnosis Date    Arthritis     Diabetes mellitus ([address]     Hyperlipidemia     Hypertension     PONV (postoperative nausea and vomiting)     with anesthesia    Spinal headache     Thyroid disease        Past Surgical History:   Procedure Laterality Date    BLADDER SUSPENSION  2016    BUNIONECTOMY Right 2022    TAILOR'S BUNIONECTOMY RIGHT FOOT performed by Dennis Anderson DPM at [address] [facility] Right 2022    TENDON SHEATH EXCISION RIGHT INDEX FINGER, TRIGGER RELEASE RIGHT INDEX performed by Autumn Yap DO at [address] [facility] [address] Left 2022    TENDON SHEATH RELEASE, TRIGGER FINGER, LEFT MIDDLE performed by Autumn Yap DO at [address] Bilateral     bunions, hammer toes      FOOT SURGERY Right 2021    CORRECTION ANGULAR DEFORMITY RIGHT 5TH DIGIT, PARTIAL EXCISION OF BONE RIGHT 5TH DIGIT (CPT 39129) performed by Radha Hutchinson DPM at [address] ([address] [facility])  1999    JOINT REPLACEMENT Right 2018    done in [place]   knee    TOTAL KNEE ARTHROPLASTY Left 10/12/2021    TOTAL LEFT KNEE REPLACEMENT ARTHROPLASTY (CE) performed by Autumn Yap DO at [address] [facility]   Allergen Reactions    Penicillins Hives    Sulfa Antibiotics Other (See Comments)     Weird feeling  Felt sick    Oxycodone Nausea And Vomiting       Social History     Socioeconomic History    Marital status:      Spouse name: None    Number of children: None    Years of education: None    Highest education level: None   Tobacco Use    Smoking status: Former     Years: 35.00     Types: Cigarettes     Quit date:      Years since quittin.    Smokeless tobacco: Never   Vaping Use    Vaping Use: Never used   Substance and Sexual Activity    Alcohol use: Never    Drug use: Never       Review of Systems  As follows except as previously noted in HPI:  Constitutional: Negative for chills, diaphoresis, fatigue, fever and unexpected weight change. Respiratory: Negative for cough, shortness of breath and wheezing. Cardiovascular: Negative for chest pain and palpitations.    Neurological: Negative for dizziness, syncope, cephalgia. GI / : negative  Musculoskeletal: see HPI       Objective:   Physical Exam   Constitutional: Oriented to person, place, and time. and appears well-developed and well-nourished. :   Head: Normocephalic and atraumatic. Eyes: EOM are normal.   Neck: Neck supple. Cardiovascular: Normal rate and regular rhythm. Pulmonary/Chest: Effort normal. No stridor. No respiratory distress, no wheezes. Abdominal:  No abnormal distension. Neurological: Alert and oriented to person, place, and time. Skin: Skin is warm and dry. Psychiatric: Normal mood and affect.  Behavior is normal. Thought content normal.    DONYA Marquez DO    1/30/23  2:45 PM

## 2023-07-28 ENCOUNTER — OFFICE VISIT (OUTPATIENT)
Dept: ORTHOPEDIC SURGERY | Age: 73
End: 2023-07-28

## 2023-07-28 VITALS — TEMPERATURE: 98 F | WEIGHT: 160 LBS | BODY MASS INDEX: 25.71 KG/M2 | HEIGHT: 66 IN

## 2023-07-28 DIAGNOSIS — M65.331 TRIGGER FINGER, RIGHT MIDDLE FINGER: Primary | ICD-10-CM

## 2023-07-28 DIAGNOSIS — M65.341 TRIGGER FINGER, RIGHT RING FINGER: ICD-10-CM

## 2023-07-28 NOTE — PROGRESS NOTES
Chief Complaint:   Chief Complaint   Patient presents with    Finger Pain     Right Hand, middle and ring trigger finger F/U. Naty Carroll follows up regarding her right hand. She has had recurrence of the pain and catching in her right middle and ring fingers. She had injections in the flexor sheath both in January and noticed relief for a couple of months. They have gradually gotten worse since that time. She also has some problems with the left hand but does not want to do anything with that right now. She would like surgery with hopes of permanent relief. Allergies; medications; past medical, surgical, family, and social history; and problem list have been reviewed today and updated as indicated in this encounter seen below. Exam: Skin condition gross neurovascular functions good in both upper extremities. Range of motion of elbows wrists and shoulders is good without pain or instability. The left hand range of motion is little limited in the middle and ring fingers with some prominence of the flexor tendons and slight catching this morning. She has some enlargement of joints consistent with some degenerative changes. Radiographs: None    ASSESSMENT:    Jd Abbott was seen today for finger pain. Diagnoses and all orders for this visit:    Trigger finger, right middle finger    Trigger finger, right ring finger        PLAN: We discussed surgical treatment which would be flexor tenovaginotomy of the ring and middle fingers of right hand. The surgery, risk, prognosis, and limitations was discussed in detail with apparent good understanding we will schedule her as an outpatient. No follow-ups on file.        Current Outpatient Medications   Medication Sig Dispense Refill    amLODIPine (NORVASC) 5 MG tablet Take 1 tablet by mouth daily AM      levothyroxine (SYNTHROID) 75 MCG tablet Take 1 tablet by mouth Daily      metoprolol succinate (TOPROL XL) 25 MG extended release tablet Take

## 2023-08-03 ENCOUNTER — PREP FOR PROCEDURE (OUTPATIENT)
Dept: ORTHOPEDIC SURGERY | Age: 73
End: 2023-08-03

## 2023-08-03 ENCOUNTER — TELEPHONE (OUTPATIENT)
Dept: ORTHOPEDIC SURGERY | Age: 73
End: 2023-08-03

## 2023-08-03 NOTE — TELEPHONE ENCOUNTER
Prior Authorization Form:      DEMOGRAPHICS:                     Patient Name:  Benjy Roldan  Patient :  1950            Insurance:  Payor: MEDICARE / Plan: MEDICARE PART A AND B / Product Type: *No Product type* /   Insurance ID Number:    Payer/Plan Subscr  Sex Relation Sub. Ins. ID Effective Group Num   1. MEDICARE - [place]* CITLALY ROJAS A 1950 Female Self [id number] 1/1/15                                    [address] [address]   2.  Oseas Childs A 1950 Female Self [id number]                                   [id number]         DIAGNOSIS & PROCEDURE:                       Procedure/Operation: TENDON SHEATH RELEASE - TRIGGER FINGERS - RIGHT MIDDLE AND RIGHT RIN           CPT Code: 35575 x 2    Diagnosis:  TRIGGER FINGERS, RIGHT MIDDLE AND RIGHT RING    ICD10 Code: B34.497, M65.341    Location:  Replaced by [facility]    Surgeon:  Kiki Downing D.O.    SCHEDULING INFORMATION:                          Date: 08/10/2023    Time: TBA              Anesthesia:  [place] Block                                                       Status:  Outpatient        Special Comments:  NONE       Electronically signed by Ezekiel Menard on 8/3/2023 at 8:10 AM

## 2023-08-09 ENCOUNTER — ANESTHESIA EVENT (OUTPATIENT)
Dept: OPERATING ROOM | Age: 73
End: 2023-08-09
Payer: MEDICARE

## 2023-08-09 ASSESSMENT — LIFESTYLE VARIABLES: SMOKING_STATUS: 0

## 2023-08-10 ENCOUNTER — HOSPITAL ENCOUNTER (OUTPATIENT)
Age: 73
Setting detail: OUTPATIENT SURGERY
Discharge: HOME | End: 2023-08-10
Attending: ORTHOPAEDIC SURGERY | Admitting: ORTHOPAEDIC SURGERY
Payer: MEDICARE

## 2023-08-10 ENCOUNTER — ANESTHESIA (OUTPATIENT)
Dept: OPERATING ROOM | Age: 73
End: 2023-08-10
Payer: MEDICARE

## 2023-08-10 VITALS
HEART RATE: 64 BPM | BODY MASS INDEX: 25.71 KG/M2 | RESPIRATION RATE: 16 BRPM | SYSTOLIC BLOOD PRESSURE: 162 MMHG | WEIGHT: 160 LBS | TEMPERATURE: 98.6 F | DIASTOLIC BLOOD PRESSURE: 73 MMHG | HEIGHT: 66 IN | OXYGEN SATURATION: 96 %

## 2023-08-10 DIAGNOSIS — M65.331 TRIGGER FINGER, RIGHT MIDDLE FINGER: Chronic | ICD-10-CM

## 2023-08-10 DIAGNOSIS — M65.341 TRIGGER FINGER, RIGHT RING FINGER: Primary | Chronic | ICD-10-CM

## 2023-08-10 LAB — GLUCOSE BLD-MCNC: 120 MG/DL (ref 74–99)

## 2023-08-10 PROCEDURE — 82962 GLUCOSE BLOOD TEST: CPT

## 2023-08-10 PROCEDURE — 3700000000 HC ANESTHESIA ATTENDED CARE: Performed by: ORTHOPAEDIC SURGERY

## 2023-08-10 PROCEDURE — 7100000010 HC PHASE II RECOVERY - FIRST 15 MIN: Performed by: ORTHOPAEDIC SURGERY

## 2023-08-10 PROCEDURE — 26055 INCISE FINGER TENDON SHEATH: CPT | Performed by: ORTHOPAEDIC SURGERY

## 2023-08-10 PROCEDURE — 3700000001 HC ADD 15 MINUTES (ANESTHESIA): Performed by: ORTHOPAEDIC SURGERY

## 2023-08-10 PROCEDURE — 2500000003 HC RX 250 WO HCPCS: Performed by: ANESTHESIOLOGY

## 2023-08-10 PROCEDURE — 2709999900 HC NON-CHARGEABLE SUPPLY: Performed by: ORTHOPAEDIC SURGERY

## 2023-08-10 PROCEDURE — 3600000012 HC SURGERY LEVEL 2 ADDTL 15MIN: Performed by: ORTHOPAEDIC SURGERY

## 2023-08-10 PROCEDURE — 6360000002 HC RX W HCPCS

## 2023-08-10 PROCEDURE — 3600000002 HC SURGERY LEVEL 2 BASE: Performed by: ORTHOPAEDIC SURGERY

## 2023-08-10 PROCEDURE — 2580000003 HC RX 258: Performed by: ANESTHESIOLOGY

## 2023-08-10 PROCEDURE — 7100000011 HC PHASE II RECOVERY - ADDTL 15 MIN: Performed by: ORTHOPAEDIC SURGERY

## 2023-08-10 PROCEDURE — 82962 GLUCOSE BLOOD TEST: CPT | Performed by: ORTHOPAEDIC SURGERY

## 2023-08-10 RX ORDER — PROPOFOL 10 MG/ML
INJECTION, EMULSION INTRAVENOUS CONTINUOUS PRN
Status: DISCONTINUED | OUTPATIENT
Start: 2023-08-10 | End: 2023-08-10 | Stop reason: SDUPTHER

## 2023-08-10 RX ORDER — ACETAMINOPHEN AND CODEINE PHOSPHATE 300; 30 MG/1; MG/1
1 TABLET ORAL EVERY 4 HOURS PRN
Qty: 30 TABLET | Refills: 0 | Status: SHIPPED | OUTPATIENT
Start: 2023-08-10 | End: 2023-08-15

## 2023-08-10 RX ORDER — LIDOCAINE HYDROCHLORIDE 5 MG/ML
INJECTION, SOLUTION INFILTRATION; INTRAVENOUS
Status: COMPLETED | OUTPATIENT
Start: 2023-08-10 | End: 2023-08-10

## 2023-08-10 RX ORDER — ONDANSETRON 2 MG/ML
INJECTION INTRAMUSCULAR; INTRAVENOUS PRN
Status: DISCONTINUED | OUTPATIENT
Start: 2023-08-10 | End: 2023-08-10 | Stop reason: SDUPTHER

## 2023-08-10 RX ORDER — MIDAZOLAM HYDROCHLORIDE 1 MG/ML
INJECTION INTRAMUSCULAR; INTRAVENOUS PRN
Status: DISCONTINUED | OUTPATIENT
Start: 2023-08-10 | End: 2023-08-10 | Stop reason: SDUPTHER

## 2023-08-10 RX ORDER — FENTANYL CITRATE 50 UG/ML
INJECTION, SOLUTION INTRAMUSCULAR; INTRAVENOUS PRN
Status: DISCONTINUED | OUTPATIENT
Start: 2023-08-10 | End: 2023-08-10 | Stop reason: SDUPTHER

## 2023-08-10 RX ORDER — SODIUM CHLORIDE, SODIUM LACTATE, POTASSIUM CHLORIDE, CALCIUM CHLORIDE 600; 310; 30; 20 MG/100ML; MG/100ML; MG/100ML; MG/100ML
INJECTION, SOLUTION INTRAVENOUS CONTINUOUS
Status: DISCONTINUED | OUTPATIENT
Start: 2023-08-10 | End: 2023-08-10 | Stop reason: HOSPADM

## 2023-08-10 RX ADMIN — SODIUM CHLORIDE, POTASSIUM CHLORIDE, SODIUM LACTATE AND CALCIUM CHLORIDE: 600; 310; 30; 20 INJECTION, SOLUTION INTRAVENOUS at 12:20

## 2023-08-10 RX ADMIN — ONDANSETRON 4 MG: 2 INJECTION INTRAMUSCULAR; INTRAVENOUS at 13:06

## 2023-08-10 RX ADMIN — MIDAZOLAM 2 MG: 1 INJECTION INTRAMUSCULAR; INTRAVENOUS at 13:02

## 2023-08-10 RX ADMIN — LIDOCAINE HYDROCHLORIDE 50 ML: 5 INJECTION, SOLUTION INFILTRATION at 13:05

## 2023-08-10 RX ADMIN — FENTANYL CITRATE 50 MCG: 50 INJECTION INTRAMUSCULAR; INTRAVENOUS at 13:06

## 2023-08-10 RX ADMIN — PROPOFOL 75 MCG/KG/MIN: 10 INJECTION, EMULSION INTRAVENOUS at 13:10

## 2023-08-10 RX ADMIN — FENTANYL CITRATE 50 MCG: 50 INJECTION INTRAMUSCULAR; INTRAVENOUS at 13:10

## 2023-08-10 ASSESSMENT — PAIN - FUNCTIONAL ASSESSMENT: PAIN_FUNCTIONAL_ASSESSMENT: NONE - DENIES PAIN

## 2023-08-10 NOTE — ANESTHESIA POSTPROCEDURE EVALUATION
Department of Anesthesiology  Postprocedure Note    Patient: Teresa Estrella  MRN: [id number]  YOB: 1950  Date of evaluation: 8/10/2023      Procedure Summary     Date: 08/10/23 Room / Location: [address] [place] [address] 01 / [address] [address]    Anesthesia Start: 1300 Anesthesia Stop: 2330    Procedure: FINGER TRIGGER RELEASE RIGHT MIDDLE FINGER AND RIGHT RING FINGER (Right: Hand) Diagnosis:       Trigger middle finger of right hand      Trigger ring finger of right hand      (Trigger middle finger of right hand [M65.331])      (Trigger ring finger of right hand [M65.341])    Surgeons: Khadijah Odonnell DO Responsible Provider: Leo Mcfarland MD    Anesthesia Type: MAC, [place] block ASA Status: 3          Anesthesia Type: No value filed.     Melania Phase I:      Melania Phase II: Melania Score: 10      Anesthesia Post Evaluation    Patient location during evaluation: PACU  Patient participation: complete - patient participated  Level of consciousness: awake  Pain score: 0  Airway patency: patent  Nausea & Vomiting: no nausea  Complications: no  Cardiovascular status: hemodynamically stable  Respiratory status: acceptable  Hydration status: stable  Multimodal analgesia pain management approach

## 2023-08-10 NOTE — OP NOTE
Operative Note      Patient: David Marina  YOB: 1950  MRN: [id number]    Date of Procedure: 8/10/2023    Pre-Op Diagnosis: Flexor tenovaginitis (trigger fingers) right middle and ring fingers    Post-Op Diagnosis: Same       Procedure(s): FINGER TRIGGER RELEASE RIGHT MIDDLE FINGER AND RIGHT RING FINGER    Surgeon(s):  DONYA Deal DO    Assistant:   * No surgical staff found *    Anesthesia: General    Estimated Blood Loss (mL): Minimal    Complications: None    Specimens:   * No specimens in log *    Detailed Description of Procedure: The patient is brought to operating room #0 identified. Adequate IV regional anesthetic was administered by anesthesia with patient supine on the operating table. The arm was then prepped and draped sterile fashion. Incision was made just distal to distal palmar crease transverse over the third ray in the palm. 2 and half power loupe modification was used throughout the procedure. The subcu soft tissue was divided down to the A1 band which was incised longitudinally with a scalpel and then complete release was accomplished proximal and distal with tenotomy scissors after elevation of soft tissues overlying. The ring finger was approached in the same fashion with a transverse incision just distal to distal palmar crease. Subcu tissues were retracted and the A1 band was incised with scalpel and complete release accomplished proximal and distal with tenotomy scissors. The flexor tendons were then brought up out of their bed to ensure complete excursion in each of the fingers and then replaced. The wounds were then thoroughly irrigated and skin was closed with 6-0 Prolene simple erupted sutures. Xeroform gauze and a bulky dressing was applied. The tourniquet was deflated and good circulation returned to the upper extremity. Patient was then taken recovery in stable condition.     All reasonable efforts have been made to minimize the risk of errors

## 2023-08-10 NOTE — ANESTHESIA PROCEDURE NOTES
Peripheral Block    Patient location during procedure: OR  Reason for block: primary anesthetic  Start time: 8/10/2023 1:05 PM  End time: 8/10/2023 1:20 PM  Staffing  Performed: anesthesiologist and resident/CRNA   Anesthesiologist: Damien Real MD  Resident/CRNA: DYLAN Kidd - CRISTINE  Other anesthesia staff: Sandi Vazquez RN  Peripheral Block   Patient position: supine  Prep: alcohol swabs  Provider prep: mask  Patient monitoring: cardiac monitor, continuous pulse ox, continuous capnometry, frequent blood pressure checks, IV access, oxygen and responsive to questions  Block type: Dominic block  Laterality: right  Injection technique: single-shot  Guidance: other      Medications Administered  lidocaine injection 0.5% - IntraVENous, Hand Right   50 mL - 8/10/2023 1:05:00 PM

## 2023-08-23 ENCOUNTER — OFFICE VISIT (OUTPATIENT)
Dept: ORTHOPEDIC SURGERY | Age: 73
End: 2023-08-23

## 2023-08-23 VITALS — WEIGHT: 160 LBS | TEMPERATURE: 98 F | BODY MASS INDEX: 25.71 KG/M2 | HEIGHT: 66 IN

## 2023-08-23 DIAGNOSIS — M65.341 TRIGGER FINGER, RIGHT RING FINGER: ICD-10-CM

## 2023-08-23 DIAGNOSIS — M65.331 TRIGGER FINGER, RIGHT MIDDLE FINGER: Primary | ICD-10-CM

## 2023-08-23 PROCEDURE — 99024 POSTOP FOLLOW-UP VISIT: CPT | Performed by: ORTHOPAEDIC SURGERY

## 2023-08-23 NOTE — PROGRESS NOTES
Chief Complaint:   Chief Complaint   Patient presents with    Hand Pain     F/u right middle and ring TFR DOS: 8/10/23       Meron Hill follows up 13 days postop flexor tenovaginotomy right ring and middle fingers. She is doing well. Is moving her fingers better. She has some swelling and a little bit of stiffness but she does not have the popping and pain that she had before. Allergies; medications; past medical, surgical, family, and social history; and problem list have been reviewed today and updated as indicated in this encounter seen below. Exam: The wounds are well approximated. Sutures were removed today without incident. There is mild edema in the hand. Her finger range of motion is fairly good and is expected to get better as the edema    Radiographs: None    ASSESSMENT:    Enio Floyd was seen today for hand pain. Diagnoses and all orders for this visit:    Trigger finger, right middle finger    Trigger finger, right ring finger        PLAN: We discussed reasonable limitations of activity for the next week and a half to allow the wounds to heal up without unnecessary discomfort or healing problems. She will take care of it and then gradually get back to more activities. We will follow as needed. Return if symptoms worsen or fail to improve. Current Outpatient Medications   Medication Sig Dispense Refill    amLODIPine (NORVASC) 5 MG tablet Take 1 tablet by mouth daily AM      levothyroxine (SYNTHROID) 75 MCG tablet Take 1 tablet by mouth Daily      metoprolol tartrate (LOPRESSOR) 25 MG tablet 1 tablet daily Takes nightly      losartan (COZAAR) 50 MG tablet Take 1 tablet by mouth daily AM      metFORMIN (GLUCOPHAGE) 500 MG tablet Take 1 tablet by mouth 2 times daily (with meals)      alendronate (FOSAMAX) 70 MG tablet 1 tablet every 7 days      SUMAtriptan (IMITREX) 50 MG tablet once as needed        No current facility-administered medications for this visit.        Patient

## 2023-10-30 ENCOUNTER — OFFICE VISIT (OUTPATIENT)
Dept: SURGERY | Age: 73
End: 2023-10-30
Payer: MEDICARE

## 2023-10-30 ENCOUNTER — TELEPHONE (OUTPATIENT)
Dept: SURGERY | Age: 73
End: 2023-10-30

## 2023-10-30 VITALS
HEART RATE: 68 BPM | TEMPERATURE: 97 F | DIASTOLIC BLOOD PRESSURE: 74 MMHG | OXYGEN SATURATION: 97 % | SYSTOLIC BLOOD PRESSURE: 169 MMHG

## 2023-10-30 DIAGNOSIS — Z12.11 ENCOUNTER FOR SCREENING COLONOSCOPY: Primary | ICD-10-CM

## 2023-10-30 DIAGNOSIS — Z12.11 COLON CANCER SCREENING: ICD-10-CM

## 2023-10-30 PROCEDURE — 1090F PRES/ABSN URINE INCON ASSESS: CPT | Performed by: SURGERY

## 2023-10-30 PROCEDURE — G8399 PT W/DXA RESULTS DOCUMENT: HCPCS | Performed by: SURGERY

## 2023-10-30 PROCEDURE — 3078F DIAST BP <80 MM HG: CPT | Performed by: SURGERY

## 2023-10-30 PROCEDURE — 99203 OFFICE O/P NEW LOW 30 MIN: CPT | Performed by: SURGERY

## 2023-10-30 PROCEDURE — 1036F TOBACCO NON-USER: CPT | Performed by: SURGERY

## 2023-10-30 PROCEDURE — 3017F COLORECTAL CA SCREEN DOC REV: CPT | Performed by: SURGERY

## 2023-10-30 PROCEDURE — G8427 DOCREV CUR MEDS BY ELIG CLIN: HCPCS | Performed by: SURGERY

## 2023-10-30 PROCEDURE — G8417 CALC BMI ABV UP PARAM F/U: HCPCS | Performed by: SURGERY

## 2023-10-30 PROCEDURE — G8484 FLU IMMUNIZE NO ADMIN: HCPCS | Performed by: SURGERY

## 2023-10-30 PROCEDURE — 3077F SYST BP >= 140 MM HG: CPT | Performed by: SURGERY

## 2023-10-30 PROCEDURE — 1123F ACP DISCUSS/DSCN MKR DOCD: CPT | Performed by: SURGERY

## 2023-10-30 RX ORDER — SODIUM, POTASSIUM,MAG SULFATES 17.5-3.13G
177 SOLUTION, RECONSTITUTED, ORAL ORAL ONCE
Qty: 1 EACH | Refills: 0 | Status: SHIPPED | OUTPATIENT
Start: 2023-10-30 | End: 2023-10-30

## 2023-10-30 NOTE — TELEPHONE ENCOUNTER
Per Dr. Jenn Olivera, patient is scheduled for COLONOSCOPY at [facility] on 23. Surgery scheduled via epic, surgeon's calendar updated. Dr. Jenn Olivera to enter orders. Follow up appointment scheduled. Golytely bowel prep instructions provided and discussed. Electronically signed by Kayli Mayorga RN on 10/30/2023 at 10:43 AMio      Prior Authorization Form:      DEMOGRAPHICS:                     Patient Name:  Tiago Cheek  Patient :  1950            Insurance:  Payor: MEDICARE / Plan: MEDICARE PART A AND B / Product Type: *No Product type* /   Insurance ID Number:    Payer/Plan Subscr  Sex Relation Sub. Ins. ID Effective Group Num   1. MEDICARE - [place]* CITLALY ROJAS A 1950 Female Self [id number] 1/1/15                                    [address] [address]   2.  Oseas VARGAS 1950 Female Self [id number]                                   [id number]         DIAGNOSIS & PROCEDURE:                       Procedure/Operation: COLONOSCOPY POSSIBLE BIOPSY OR POLYPECTOMY            CPT Code: 99089    Diagnosis:  screening     ICD10 Code: Z12.11    Location:  [address] [place] [address]    Surgeon:  Fang Jewell INFORMATION:                          Date: 23    Time: TBD              Anesthesia:  MAC/TIVA                                                       Status:  Outpatient        Special Comments:         Electronically signed by Kayli Mayorga RN on 10/30/2023 at 10:43 AM

## 2023-10-30 NOTE — PROGRESS NOTES
General Surgery History and Physical    Patient's Name/Date of Birth: Giana Tong / 1950    Date: 10/30/2023     Surgeon: Kate Valdez M.D.    PCP: Gustabo Knight DO     Chief Complaint: Screening for colon cancer, needs colonoscopy    HPI:   Giana Tong is a 74 y.o. female who presents for evaluation of screening colonoscopy. no history of hemorrhoids, denies history of rectal bleeding, melena, constipation, abdominal pain, abdominal operations. Denies dark or black stools. Denies reflux, heart burn. Denies history of colon cancer in their family. No history of recent weight loss. They are a nonsmoker. They have had a colonoscopy. Previous colonsocopy showed normal and was done 10yrs ago in [place]. Allergies   Allergen Reactions    Penicillins Hives    Sulfa Antibiotics Other (See Comments)     Weird feeling  Felt sick    Indomethacin Nausea Only and Nausea And Vomiting    Oxycodone Nausea And Vomiting       Prior to Admission medications    Medication Sig Start Date End Date Taking?  Authorizing Provider   amLODIPine (NORVASC) 5 MG tablet Take 1 tablet by mouth daily AM 11/2/21   Roberto Douglass MD   levothyroxine (SYNTHROID) 75 MCG tablet Take 1 tablet by mouth Daily 9/22/21   Roberto Douglass MD   metoprolol tartrate (LOPRESSOR) 25 MG tablet 1 tablet daily Takes nightly 5/28/20   Roberto Douglass MD   losartan (COZAAR) 50 MG tablet Take 1 tablet by mouth daily AM 5/28/20   Roberto Douglass MD   metFORMIN (GLUCOPHAGE) 500 MG tablet Take 1 tablet by mouth 2 times daily (with meals) 6/13/20   Roberto Douglass MD   alendronate (FOSAMAX) 70 MG tablet 1 tablet every 7 days 6/1/20   Roberto Douglass MD   SUMAtriptan (IMITREX) 50 MG tablet once as needed  6/10/20   Roberto Douglass MD       Past Medical History:   Diagnosis Date    Arthritis     Diabetes mellitus ([address])     Hyperlipidemia     Hypertension     PONV (postoperative nausea and vomiting)     with

## 2023-11-29 PROBLEM — Z12.11 COLON CANCER SCREENING: Status: RESOLVED | Noted: 2023-10-30 | Resolved: 2023-11-29

## 2023-12-11 PROBLEM — Z12.11 COLON CANCER SCREENING: Status: ACTIVE | Noted: 2023-10-30

## 2023-12-13 NOTE — PROGRESS NOTES
[facility]                                                                                                                    PRE OP INSTRUCTIONS FOR  Barbara Fried        Date: 12/13/2023    Date of surgery: 1214/23   Arrival Time: Hospital will call you between 5pm and 7pm with your final arrival time for surgery    Nothing by mouth (NPO) as instructed. ___nothing to eat or drink after midnight_________________________________________________________________    Take the following medications with a small sip of water on the morning of Surgery: metoprolol,amlodipine, levothyroxine, if needed imitrex     Diabetics may take evening dose of insulin but none after midnight. If you feel symptomatic or low blood sugar morning of surgery drink 1-2 ounces of apple juice only. Aspirin, Ibuprofen, Advil, Naproxen, Vitamin E and other Anti-inflammatory products should be stopped  before surgery  as directed by your physician. Take Tylenol only unless instructed otherwise by your surgeon. Check with your Doctor regarding stopping Plavix, Coumadin, Lovenox, Eliquis, Effient, or other blood thinners. Do not smoke,use illicit drugs and do not drink any alcoholic beverages 24 hours prior to surgery. You may brush your teeth the morning of surgery. DO NOT SWALLOW WATER    You MUST make arrangements for a responsible adult to take you home after your surgery. You will not be allowed to leave alone or drive yourself home. It is strongly suggested someone stay with you the first 24 hrs. Your surgery will be cancelled if you do not have a ride home. Please wear simple, loose fitting clothing to the hospital.  Marcellus Escobedogabrielle not bring valuables (money, credit cards, checkbooks, etc.) Do not wear any makeup (including no eye makeup) or nail polish on your fingers or toes. DO NOT wear any jewelry or piercings on day of surgery. All body piercing jewelry must be removed.     Shower the night before

## 2023-12-14 ENCOUNTER — HOSPITAL ENCOUNTER (OUTPATIENT)
Age: 73
Setting detail: OUTPATIENT SURGERY
Discharge: HOME | End: 2023-12-14
Attending: SURGERY | Admitting: SURGERY
Payer: MEDICARE

## 2023-12-14 ENCOUNTER — ANESTHESIA EVENT (OUTPATIENT)
Dept: ENDOSCOPY | Age: 73
End: 2023-12-14
Payer: MEDICARE

## 2023-12-14 ENCOUNTER — ANESTHESIA (OUTPATIENT)
Dept: ENDOSCOPY | Age: 73
End: 2023-12-14
Payer: MEDICARE

## 2023-12-14 VITALS
WEIGHT: 154.8 LBS | BODY MASS INDEX: 24.88 KG/M2 | OXYGEN SATURATION: 97 % | DIASTOLIC BLOOD PRESSURE: 69 MMHG | HEIGHT: 66 IN | RESPIRATION RATE: 18 BRPM | SYSTOLIC BLOOD PRESSURE: 159 MMHG | HEART RATE: 72 BPM | TEMPERATURE: 97.9 F

## 2023-12-14 PROCEDURE — 7100000010 HC PHASE II RECOVERY - FIRST 15 MIN: Performed by: SURGERY

## 2023-12-14 PROCEDURE — 6360000002 HC RX W HCPCS: Performed by: NURSE ANESTHETIST, CERTIFIED REGISTERED

## 2023-12-14 PROCEDURE — 3609008400 HC SIGMOIDOSCOPY DIAGNOSTIC: Performed by: SURGERY

## 2023-12-14 PROCEDURE — 3700000001 HC ADD 15 MINUTES (ANESTHESIA): Performed by: SURGERY

## 2023-12-14 PROCEDURE — 7100000011 HC PHASE II RECOVERY - ADDTL 15 MIN: Performed by: SURGERY

## 2023-12-14 PROCEDURE — 2709999900 HC NON-CHARGEABLE SUPPLY: Performed by: SURGERY

## 2023-12-14 PROCEDURE — 2580000003 HC RX 258: Performed by: SURGERY

## 2023-12-14 PROCEDURE — 3700000000 HC ANESTHESIA ATTENDED CARE: Performed by: SURGERY

## 2023-12-14 RX ORDER — SODIUM CHLORIDE 0.9 % (FLUSH) 0.9 %
5-40 SYRINGE (ML) INJECTION EVERY 12 HOURS SCHEDULED
Status: DISCONTINUED | OUTPATIENT
Start: 2023-12-14 | End: 2023-12-14 | Stop reason: HOSPADM

## 2023-12-14 RX ORDER — SODIUM CHLORIDE 9 MG/ML
INJECTION, SOLUTION INTRAVENOUS CONTINUOUS
Status: DISCONTINUED | OUTPATIENT
Start: 2023-12-14 | End: 2023-12-14 | Stop reason: HOSPADM

## 2023-12-14 RX ORDER — SODIUM CHLORIDE 0.9 % (FLUSH) 0.9 %
5-40 SYRINGE (ML) INJECTION PRN
Status: DISCONTINUED | OUTPATIENT
Start: 2023-12-14 | End: 2023-12-14 | Stop reason: HOSPADM

## 2023-12-14 RX ORDER — SODIUM CHLORIDE 9 MG/ML
INJECTION, SOLUTION INTRAVENOUS PRN
Status: DISCONTINUED | OUTPATIENT
Start: 2023-12-14 | End: 2023-12-14 | Stop reason: HOSPADM

## 2023-12-14 RX ORDER — PROPOFOL 10 MG/ML
INJECTION, EMULSION INTRAVENOUS PRN
Status: DISCONTINUED | OUTPATIENT
Start: 2023-12-14 | End: 2023-12-14 | Stop reason: SDUPTHER

## 2023-12-14 RX ADMIN — PROPOFOL 150 MCG/KG/MIN: 10 INJECTION, EMULSION INTRAVENOUS at 11:08

## 2023-12-14 RX ADMIN — SODIUM CHLORIDE: 9 INJECTION, SOLUTION INTRAVENOUS at 09:56

## 2023-12-14 RX ADMIN — PROPOFOL 100 MG: 10 INJECTION, EMULSION INTRAVENOUS at 11:04

## 2023-12-14 ASSESSMENT — PAIN - FUNCTIONAL ASSESSMENT
PAIN_FUNCTIONAL_ASSESSMENT: 0-10
PAIN_FUNCTIONAL_ASSESSMENT: NONE - DENIES PAIN

## 2023-12-14 ASSESSMENT — PAIN DESCRIPTION - LOCATION: LOCATION: ABDOMEN

## 2023-12-14 ASSESSMENT — PAIN DESCRIPTION - DESCRIPTORS
DESCRIPTORS: CRAMPING
DESCRIPTORS: CRAMPING

## 2023-12-14 ASSESSMENT — PAIN SCALES - GENERAL: PAINLEVEL_OUTOF10: 3

## 2023-12-14 NOTE — DISCHARGE INSTRUCTIONS
Discharge Instructions for Colonoscopy  [facility]  Ramona Roldan MD, MS    The results of your colonoscopy and pathology results of biopsies, if taken, will be discussed at your follow up appointment. Colonoscopy is a visual exam of the lining of the large intestine, also called the bowel or colon, with a colonoscope. A colonoscope is a flexible tube with a light and a viewing device. It allows the doctor to view the inside of the colon through a tiny video camera. Colonoscopy is performed for many reasons: unexplained anemia , pain, diarrhea , bloody stools, cancer screening, among many other reasons. Complications from a colonoscopy are rare. Some possible serious complications include perforated bowel (which might require surgery) and bleeding (which could require blood transfusion ). Minor complications include bloating, gas, and cramping that can last for 1-2 days after the procedure. Because air is put into your colon during the procedure, it is normal to pass large amounts of air from your rectum. You may not have a bowel movement for 1-3 days after the procedure. What You Will Need   Someone to drive you home after the procedure    Steps to [address] [place] [address] when you get home. Because the sedative will make you drowsy, don't drive, operate machinery, or make important decisions the day of the procedure. Feelings of bloating, gas, or cramping may persist for 24 hours. Diet    Try sips of water first. If tolerated, resume regular diet or the diet recommended by your physician. Do not drink alcohol for 24 hours. Physical Activity    Ask your doctor when you will be able to return to work. Do not drive, operate heavy machinery, or do activities that require coordination or balance for 24 hours. Otherwise, return to your normal routine as soon as you are comfortable to do so, which is usually the next day after the procedure.     Medications    When

## 2023-12-14 NOTE — H&P
General Surgery History and Physical    Patient's Name/Date of Birth: Teresa Estrella / 1950    Date: 12/14/2023     Surgeon: Alfredo Adorno M.D.    PCP: Pilar Goins DO     Chief Complaint: Screening for colon cancer, needs colonoscopy    HPI:   Teresa Estrella is a 74 y.o. female who presents for evaluation of screening colonoscopy. no history of hemorrhoids, denies history of rectal bleeding, melena, constipation, abdominal pain, abdominal operations. Denies dark or black stools. Denies reflux, heart burn. Denies history of colon cancer in their family. No history of recent weight loss. They are a nonsmoker. They have had a colonoscopy. Previous colonsocopy showed normal and was done 10yrs ago in [place]. Allergies   Allergen Reactions    Penicillins Hives    Sulfa Antibiotics Other (See Comments)     Weird feeling  Felt sick    Indomethacin Nausea Only and Nausea And Vomiting    Oxycodone Nausea And Vomiting       Prior to Admission medications    Medication Sig Start Date End Date Taking?  Authorizing Provider   amLODIPine (NORVASC) 5 MG tablet Take 1 tablet by mouth daily AM 11/2/21   Roberto Douglass MD   levothyroxine (SYNTHROID) 75 MCG tablet Take 1 tablet by mouth Daily 9/22/21   Roberto Douglass MD   metoprolol tartrate (LOPRESSOR) 25 MG tablet 1 tablet daily Takes nightly 5/28/20   Roberto Douglass MD   losartan (COZAAR) 50 MG tablet Take 1 tablet by mouth daily AM 5/28/20   Roberto Douglass MD   metFORMIN (GLUCOPHAGE) 500 MG tablet Take 1 tablet by mouth 2 times daily (with meals) 6/13/20   Roberto Douglass MD   alendronate (FOSAMAX) 70 MG tablet 1 tablet every 7 days 6/1/20   Roberto Douglass MD   SUMAtriptan (IMITREX) 50 MG tablet once as needed  6/10/20   Roberto Douglass MD       Past Medical History:   Diagnosis Date    Arthritis     Diabetes mellitus ([address])     Hyperlipidemia     Hypertension     PONV (postoperative nausea and vomiting)     with MD Moeller & RN Anand/Nursing

## 2023-12-14 NOTE — OP NOTE
[facility]  Colonoscopy Operative Report    DATE OF PROCEDURE: 12/14/2023     PREOPERATIVE DIAGNOSIS: Screening colon cancer    POSTOPERATIVE DIAGNOSIS/FINDINGS: Diverticulosis, severe sigmoid to 40cm    SURGEON: Soraida Marino MD    ASSISTANT: None    OPERATION: Flexible sigmoidoscopy with inability to advance past 40cm due to tortuous sigmoid and fixed     ANESTHESIA: Local monitored anesthesia. COMPLICATIONS: None. EBL: None    PRIOR TO THE EXAM: Gen: comfortable, no distress, awake and alert; Lungs: Clear;  Heart:regular rate and rhythm, normal S1S2     BRIEF HISTORY:  This is a 74 y.o. female who presents for screening colonoscopy. The patient has No personal and family history of colon cancer. The patient had a colonoscopy 10yrs ago and was normal per her report. My recommendation is to proceed with colonoscopy. The patient was advised of the risks, benefits, complications and options including the risk of bleeding and perforation. The patient understood and agreed to proceed. PROCEDURE:  In the left side down decubitus position, a digital rectal exam was performed. There were no masses or abnormalities noted. The scope was lubricated and inserted into the anus. The scope was then passed under direct visualization in a retrograde fashion 45cm. There was significant difficulty with passage to this point with moderate divertiuclosis, tortuous colon and fixed colon with significant pressure to advance. There was concern for the amount of pressure required to advance and we elected to abort. As the scope is withdrawn, visualization of the descending and sigmoid colon. Moderate large and small mouthed diverticula were present. No abnormalities were seen. The scope is then pulled through the sigmoid colon into the rectum. The scope is retroflexed at the anal verge. No abnormalities are seen at the anal verge. The scope was then straightened and removed.  Total withdrawal time was 4 minutes. The patient tolerated the procedure well        SPECIMENS:  none    PLAN:    Resume high fiber diet  64oz water intake daily    Recommendation is for next colonoscopy per NCCN guidelines-     Consider barium enema vs monitor for change in bowel habits as this was screening purposes only.  Consider CT will discuss in office    Lucia Love MD, MS  Minimally Invasive and Bariatric Surgeon  12/14/2023  11:36 AM

## 2023-12-14 NOTE — ANESTHESIA POSTPROCEDURE EVALUATION
Department of Anesthesiology  Postprocedure Note    Patient: Eriberto Zaman  MRN: [id number]  YOB: 1950  Date of evaluation: 12/14/2023    Procedure Summary       Date: 12/14/23 Room / Location: [address] [place] [address] 01 / [id number] Matt Harris    Anesthesia Start: 6763 Anesthesia Stop: 1121    Procedure: SIGMOIDOSCOPY Diagnosis:       Colon cancer screening      (Colon cancer screening [Z12.11])    Surgeons: Ryan Hampton MD Responsible Provider: Nicky Fonseca MD    Anesthesia Type: MAC ASA Status: 3            Anesthesia Type: No value filed. Melania Phase I: Melania Score: 10    Melania Phase II: Melania Score: 10    Anesthesia Post Evaluation    Patient location during evaluation: PACU  Patient participation: complete - patient participated  Level of consciousness: awake and alert  Airway patency: patent  Nausea & Vomiting: no nausea and no vomiting  Cardiovascular status: hemodynamically stable  Respiratory status: acceptable  Hydration status: euvolemic  Pain management: satisfactory to patient    No notable events documented.

## 2023-12-27 ENCOUNTER — OFFICE VISIT (OUTPATIENT)
Dept: SURGERY | Age: 73
End: 2023-12-27
Payer: MEDICARE

## 2023-12-27 VITALS
SYSTOLIC BLOOD PRESSURE: 161 MMHG | DIASTOLIC BLOOD PRESSURE: 90 MMHG | BODY MASS INDEX: 24.75 KG/M2 | HEART RATE: 70 BPM | TEMPERATURE: 97 F | HEIGHT: 66 IN | WEIGHT: 154 LBS

## 2023-12-27 DIAGNOSIS — K57.90 DIVERTICULOSIS: ICD-10-CM

## 2023-12-27 DIAGNOSIS — R93.3 ABNORMAL COLONOSCOPY: Primary | ICD-10-CM

## 2023-12-27 PROCEDURE — 3080F DIAST BP >= 90 MM HG: CPT | Performed by: SURGERY

## 2023-12-27 PROCEDURE — 99211 OFF/OP EST MAY X REQ PHY/QHP: CPT | Performed by: SURGERY

## 2023-12-27 PROCEDURE — G8427 DOCREV CUR MEDS BY ELIG CLIN: HCPCS | Performed by: SURGERY

## 2023-12-27 PROCEDURE — G8420 CALC BMI NORM PARAMETERS: HCPCS | Performed by: SURGERY

## 2023-12-27 PROCEDURE — 3077F SYST BP >= 140 MM HG: CPT | Performed by: SURGERY

## 2023-12-28 ENCOUNTER — TELEPHONE (OUTPATIENT)
Dept: SURGERY | Age: 73
End: 2023-12-28

## 2023-12-28 NOTE — TELEPHONE ENCOUNTER
Per the order of Dr. Jenn Olivera, referral faxed to [facility]. Fax confirmation received. They will contact patient to schedule consult.   Electronically signed by Mica Olmos on 12/28/23 at 10:25 AM EST

## 2023-12-28 NOTE — TELEPHONE ENCOUNTER
Patient getting new insurance the first of the year. Patient asked to wait for scheduling until she has her new insurance information. Patient will call to schedule once card received. Patient provided with office contact information. Per Dr. Tricia Jamil, patient can be scheduled for telephone visit for results.   Electronically signed by Flora Crooks on 12/28/23 at 1:24 PM EST

## 2024-01-04 DIAGNOSIS — R93.3 ABNORMAL COLONOSCOPY: Primary | ICD-10-CM

## 2024-01-10 PROBLEM — Z12.11 COLON CANCER SCREENING: Status: RESOLVED | Noted: 2023-10-30 | Resolved: 2024-01-10

## 2024-01-18 ENCOUNTER — TRANSCRIBE ORDERS (OUTPATIENT)
Dept: ADMINISTRATIVE | Age: 74
End: 2024-01-18

## 2024-01-18 DIAGNOSIS — M81.8 OSTEOPOROSIS, DISUSE: Primary | ICD-10-CM

## 2024-01-18 DIAGNOSIS — N95.9 MENOPAUSAL AND POSTMENOPAUSAL DISORDER: ICD-10-CM

## 2024-01-25 ENCOUNTER — HOSPITAL ENCOUNTER (OUTPATIENT)
Dept: CT IMAGING | Age: 74
Discharge: HOME | End: 2024-01-25
Attending: SURGERY

## 2024-01-25 DIAGNOSIS — K57.90 DIVERTICULOSIS: ICD-10-CM

## 2024-01-25 DIAGNOSIS — R93.3 ABNORMAL COLONOSCOPY: ICD-10-CM

## 2024-02-05 ENCOUNTER — HOSPITAL ENCOUNTER (OUTPATIENT)
Dept: CT IMAGING | Age: 74
Discharge: HOME | End: 2024-02-05
Attending: SURGERY
Payer: MEDICARE

## 2024-02-05 PROCEDURE — 6360000004 HC RX CONTRAST MEDICATION: Performed by: RADIOLOGY

## 2024-02-05 PROCEDURE — 74177 CT ABD & PELVIS W/CONTRAST: CPT

## 2024-02-05 RX ADMIN — IOPAMIDOL 75 ML: 755 INJECTION, SOLUTION INTRAVENOUS at 09:38

## 2024-02-05 RX ADMIN — IOPAMIDOL 18 ML: 755 INJECTION, SOLUTION INTRAVENOUS at 09:38

## 2024-02-07 ENCOUNTER — OFFICE VISIT (OUTPATIENT)
Dept: SURGERY | Age: 74
End: 2024-02-07
Payer: MEDICARE

## 2024-02-07 VITALS
BODY MASS INDEX: 24.75 KG/M2 | WEIGHT: 154 LBS | HEIGHT: 66 IN | SYSTOLIC BLOOD PRESSURE: 131 MMHG | HEART RATE: 68 BPM | DIASTOLIC BLOOD PRESSURE: 68 MMHG | TEMPERATURE: 97.4 F

## 2024-02-07 DIAGNOSIS — K56.699 COLON STRICTURE (HCC): Primary | ICD-10-CM

## 2024-02-07 PROCEDURE — G8427 DOCREV CUR MEDS BY ELIG CLIN: HCPCS | Performed by: SURGERY

## 2024-02-07 PROCEDURE — G8484 FLU IMMUNIZE NO ADMIN: HCPCS | Performed by: SURGERY

## 2024-02-07 PROCEDURE — 3017F COLORECTAL CA SCREEN DOC REV: CPT | Performed by: SURGERY

## 2024-02-07 PROCEDURE — 3075F SYST BP GE 130 - 139MM HG: CPT | Performed by: SURGERY

## 2024-02-07 PROCEDURE — G8420 CALC BMI NORM PARAMETERS: HCPCS | Performed by: SURGERY

## 2024-02-07 PROCEDURE — 1090F PRES/ABSN URINE INCON ASSESS: CPT | Performed by: SURGERY

## 2024-02-07 PROCEDURE — 3078F DIAST BP <80 MM HG: CPT | Performed by: SURGERY

## 2024-02-07 PROCEDURE — G8399 PT W/DXA RESULTS DOCUMENT: HCPCS | Performed by: SURGERY

## 2024-02-07 PROCEDURE — 1036F TOBACCO NON-USER: CPT | Performed by: SURGERY

## 2024-02-07 PROCEDURE — 1123F ACP DISCUSS/DSCN MKR DOCD: CPT | Performed by: SURGERY

## 2024-02-07 PROCEDURE — 99213 OFFICE O/P EST LOW 20 MIN: CPT | Performed by: SURGERY

## 2024-02-07 NOTE — PROGRESS NOTES
for - focal weakness, gangrene  Psych/Neuro ROS: negative for - visual or auditory hallucinations, suicidal ideation    Physical exam:   /68 (Site: Left Upper Arm, Position: Sitting, Cuff Size: Medium Adult)   Pulse 68   Temp 97.4 °F (36.3 °C)   Ht 1.676 m (5' 6\")   Wt 69.9 kg (154 lb)   BMI 24.86 kg/m²   General appearance:  NAD, appears stated age  Head: NCAT, PERRLA, EOMI, red conjunctiva  Neck: supple, no masses, trachea midline  Lungs: Equal chest rise bilateral, no retractions, no wheezing  Heart: Reg rate  Abdomen: soft, nontender, nondistended  Skin; warm and dry, no cyanosis  Gu: no cva tenderness  Extremities: atraumatic, no focal motor deficits, no open wounds  Psych: No tremor, visual hallucinations      Radiology: I reviewed relevant abdominal imaging from this admission and that available in the EMR including CT abd/pel from recently ordered following colonoscopy. My assessment is she has evidence of a narrowing or sigmoid stricture with what appears to be focal segmental change.  There is no overt malignancy or wall thickening but there is concerns for intraluminal narrowing either from a stricture or mass.              Assessment:  Chana Enriquez is a 73 y.o. female with sigmoid stricture  Patient Active Problem List   Diagnosis    Arthritis of left knee    Hypertension    Hyperlipidemia    Thyroid disease    Diabetes mellitus (HCC)    Trigger finger, left middle finger    Bunionette of right foot    Trigger finger, right middle finger    Trigger finger, right ring finger         Plan:  Incomplete colonoscopy up to 40 cm secondary to sharp angulation versus stricture.  Patient is currently asymptomatic from this.  I discussed with her repeat colonoscopy is likely not to be productive without further imaging to qualify her stricture.  CT imaging had requested or referred for CT colonography.  This procedure is not performed in a local institution therefore we will have to attempt to

## 2024-02-26 ENCOUNTER — HOSPITAL ENCOUNTER (OUTPATIENT)
Dept: MAMMOGRAPHY | Age: 74
Discharge: HOME | End: 2024-02-28
Payer: MEDICARE

## 2024-02-26 DIAGNOSIS — N95.9 MENOPAUSAL AND POSTMENOPAUSAL DISORDER: ICD-10-CM

## 2024-02-26 DIAGNOSIS — M81.8 OSTEOPOROSIS, DISUSE: ICD-10-CM

## 2024-02-26 PROCEDURE — 77080 DXA BONE DENSITY AXIAL: CPT

## 2024-02-27 ENCOUNTER — TELEPHONE (OUTPATIENT)
Dept: SURGERY | Age: 74
End: 2024-02-27

## 2024-02-27 NOTE — TELEPHONE ENCOUNTER
CT colonography scheduled on 1/2/25 at [facility].   Patient to  prep from facility on 12/31/24.   Follow up appointment scheduled with Dr. Alvarado 1/15/25.   Electronically signed by Cathie Gerber RN on 2/27/2024 at 8:35 AM

## 2024-12-10 ENCOUNTER — HOSPITAL ENCOUNTER (EMERGENCY)
Age: 74
Discharge: HOME | End: 2024-12-10
Attending: STUDENT IN AN ORGANIZED HEALTH CARE EDUCATION/TRAINING PROGRAM
Payer: MEDICARE

## 2024-12-10 ENCOUNTER — APPOINTMENT (OUTPATIENT)
Dept: CT IMAGING | Age: 74
End: 2024-12-10
Payer: MEDICARE

## 2024-12-10 VITALS
HEART RATE: 68 BPM | SYSTOLIC BLOOD PRESSURE: 151 MMHG | OXYGEN SATURATION: 96 % | DIASTOLIC BLOOD PRESSURE: 66 MMHG | RESPIRATION RATE: 18 BRPM | TEMPERATURE: 98.2 F

## 2024-12-10 DIAGNOSIS — R11.2 NAUSEA AND VOMITING, UNSPECIFIED VOMITING TYPE: Primary | ICD-10-CM

## 2024-12-10 DIAGNOSIS — R51.9 ACUTE NONINTRACTABLE HEADACHE, UNSPECIFIED HEADACHE TYPE: ICD-10-CM

## 2024-12-10 LAB
ALBUMIN SERPL-MCNC: 4.2 G/DL (ref 3.5–5.2)
ALP SERPL-CCNC: 80 U/L (ref 35–104)
ALT SERPL-CCNC: 10 U/L (ref 0–32)
ANION GAP SERPL CALCULATED.3IONS-SCNC: 11 MMOL/L (ref 7–16)
AST SERPL-CCNC: 13 U/L (ref 0–31)
BASOPHILS # BLD: 0.06 K/UL (ref 0–0.2)
BASOPHILS NFR BLD: 1 % (ref 0–2)
BILIRUB SERPL-MCNC: 0.3 MG/DL (ref 0–1.2)
BUN SERPL-MCNC: 19 MG/DL (ref 6–23)
CALCIUM SERPL-MCNC: 10.3 MG/DL (ref 8.6–10.2)
CHLORIDE SERPL-SCNC: 102 MMOL/L (ref 98–107)
CO2 SERPL-SCNC: 26 MMOL/L (ref 22–29)
CREAT SERPL-MCNC: 1 MG/DL (ref 0.5–1)
EOSINOPHIL # BLD: 0.18 K/UL (ref 0.05–0.5)
EOSINOPHILS RELATIVE PERCENT: 2 % (ref 0–6)
ERYTHROCYTE [DISTWIDTH] IN BLOOD BY AUTOMATED COUNT: 13 % (ref 11.5–15)
FLUAV RNA RESP QL NAA+PROBE: NOT DETECTED
FLUBV RNA RESP QL NAA+PROBE: NOT DETECTED
GFR, ESTIMATED: 59 ML/MIN/1.73M2
GLUCOSE SERPL-MCNC: 151 MG/DL (ref 74–99)
HCT VFR BLD AUTO: 42.7 % (ref 34–48)
HGB BLD-MCNC: 14.6 G/DL (ref 11.5–15.5)
IMM GRANULOCYTES # BLD AUTO: 0.03 K/UL (ref 0–0.58)
IMM GRANULOCYTES NFR BLD: 0 % (ref 0–5)
LACTATE BLDV-SCNC: 1.7 MMOL/L (ref 0.5–2.2)
LYMPHOCYTES NFR BLD: 1.14 K/UL (ref 1.5–4)
LYMPHOCYTES RELATIVE PERCENT: 15 % (ref 20–42)
MCH RBC QN AUTO: 30.9 PG (ref 26–35)
MCHC RBC AUTO-ENTMCNC: 34.2 G/DL (ref 32–34.5)
MCV RBC AUTO: 90.3 FL (ref 80–99.9)
MONOCYTES NFR BLD: 0.64 K/UL (ref 0.1–0.95)
MONOCYTES NFR BLD: 9 % (ref 2–12)
NEUTROPHILS NFR BLD: 72 % (ref 43–80)
NEUTS SEG NFR BLD: 5.37 K/UL (ref 1.8–7.3)
PLATELET # BLD AUTO: 201 K/UL (ref 130–450)
PMV BLD AUTO: 11.3 FL (ref 7–12)
POTASSIUM SERPL-SCNC: 4 MMOL/L (ref 3.5–5)
PROT SERPL-MCNC: 7.9 G/DL (ref 6.4–8.3)
RBC # BLD AUTO: 4.73 M/UL (ref 3.5–5.5)
SARS-COV-2 RNA RESP QL NAA+PROBE: NOT DETECTED
SODIUM SERPL-SCNC: 139 MMOL/L (ref 132–146)
SOURCE: NORMAL
SPECIMEN DESCRIPTION: NORMAL
TROPONIN I SERPL HS-MCNC: 10 NG/L (ref 0–9)
TROPONIN I SERPL HS-MCNC: 11 NG/L (ref 0–9)
WBC OTHER # BLD: 7.4 K/UL (ref 4.5–11.5)

## 2024-12-10 PROCEDURE — 85025 COMPLETE CBC W/AUTO DIFF WBC: CPT

## 2024-12-10 PROCEDURE — 84484 ASSAY OF TROPONIN QUANT: CPT

## 2024-12-10 PROCEDURE — 93005 ELECTROCARDIOGRAM TRACING: CPT | Performed by: STUDENT IN AN ORGANIZED HEALTH CARE EDUCATION/TRAINING PROGRAM

## 2024-12-10 PROCEDURE — 99284 EMERGENCY DEPT VISIT MOD MDM: CPT

## 2024-12-10 PROCEDURE — 83605 ASSAY OF LACTIC ACID: CPT

## 2024-12-10 PROCEDURE — 6360000002 HC RX W HCPCS: Performed by: STUDENT IN AN ORGANIZED HEALTH CARE EDUCATION/TRAINING PROGRAM

## 2024-12-10 PROCEDURE — 2580000003 HC RX 258: Performed by: STUDENT IN AN ORGANIZED HEALTH CARE EDUCATION/TRAINING PROGRAM

## 2024-12-10 PROCEDURE — 96374 THER/PROPH/DIAG INJ IV PUSH: CPT

## 2024-12-10 PROCEDURE — 87636 SARSCOV2 & INF A&B AMP PRB: CPT

## 2024-12-10 PROCEDURE — 80053 COMPREHEN METABOLIC PANEL: CPT

## 2024-12-10 PROCEDURE — 96375 TX/PRO/DX INJ NEW DRUG ADDON: CPT

## 2024-12-10 PROCEDURE — 6370000000 HC RX 637 (ALT 250 FOR IP): Performed by: STUDENT IN AN ORGANIZED HEALTH CARE EDUCATION/TRAINING PROGRAM

## 2024-12-10 PROCEDURE — 70450 CT HEAD/BRAIN W/O DYE: CPT

## 2024-12-10 RX ORDER — DEXAMETHASONE SODIUM PHOSPHATE 10 MG/ML
10 INJECTION INTRAMUSCULAR; INTRAVENOUS ONCE
Status: COMPLETED | OUTPATIENT
Start: 2024-12-10 | End: 2024-12-10

## 2024-12-10 RX ORDER — DIPHENHYDRAMINE HYDROCHLORIDE 50 MG/ML
25 INJECTION INTRAMUSCULAR; INTRAVENOUS ONCE
Status: COMPLETED | OUTPATIENT
Start: 2024-12-10 | End: 2024-12-10

## 2024-12-10 RX ORDER — SUMATRIPTAN SUCCINATE 25 MG/1
25 TABLET ORAL
Qty: 5 TABLET | Refills: 1 | Status: SHIPPED | OUTPATIENT
Start: 2024-12-10 | End: 2024-12-10

## 2024-12-10 RX ORDER — PROCHLORPERAZINE MALEATE 10 MG
10 TABLET ORAL EVERY 8 HOURS PRN
Qty: 21 TABLET | Refills: 0 | Status: SHIPPED | OUTPATIENT
Start: 2024-12-10 | End: 2024-12-17

## 2024-12-10 RX ORDER — PROCHLORPERAZINE EDISYLATE 5 MG/ML
10 INJECTION INTRAMUSCULAR; INTRAVENOUS ONCE
Status: COMPLETED | OUTPATIENT
Start: 2024-12-10 | End: 2024-12-10

## 2024-12-10 RX ORDER — KETOROLAC TROMETHAMINE 15 MG/ML
15 INJECTION, SOLUTION INTRAMUSCULAR; INTRAVENOUS ONCE
Status: COMPLETED | OUTPATIENT
Start: 2024-12-10 | End: 2024-12-10

## 2024-12-10 RX ORDER — 0.9 % SODIUM CHLORIDE 0.9 %
1000 INTRAVENOUS SOLUTION INTRAVENOUS ONCE
Status: COMPLETED | OUTPATIENT
Start: 2024-12-10 | End: 2024-12-10

## 2024-12-10 RX ORDER — ACETAMINOPHEN 500 MG
1000 TABLET ORAL ONCE
Status: COMPLETED | OUTPATIENT
Start: 2024-12-10 | End: 2024-12-10

## 2024-12-10 RX ADMIN — SODIUM CHLORIDE 1000 ML: 9 INJECTION, SOLUTION INTRAVENOUS at 19:02

## 2024-12-10 RX ADMIN — ACETAMINOPHEN 1000 MG: 500 TABLET ORAL at 20:12

## 2024-12-10 RX ADMIN — KETOROLAC TROMETHAMINE 15 MG: 15 INJECTION, SOLUTION INTRAMUSCULAR; INTRAVENOUS at 20:42

## 2024-12-10 RX ADMIN — DEXAMETHASONE SODIUM PHOSPHATE 10 MG: 10 INJECTION INTRAMUSCULAR; INTRAVENOUS at 20:43

## 2024-12-10 RX ADMIN — DIPHENHYDRAMINE HYDROCHLORIDE 25 MG: 50 INJECTION INTRAMUSCULAR; INTRAVENOUS at 19:03

## 2024-12-10 RX ADMIN — PROCHLORPERAZINE EDISYLATE 10 MG: 5 INJECTION INTRAMUSCULAR; INTRAVENOUS at 19:02

## 2024-12-10 ASSESSMENT — PAIN - FUNCTIONAL ASSESSMENT
PAIN_FUNCTIONAL_ASSESSMENT: 0-10
PAIN_FUNCTIONAL_ASSESSMENT: ACTIVITIES ARE NOT PREVENTED

## 2024-12-10 ASSESSMENT — PAIN SCALES - GENERAL
PAINLEVEL_OUTOF10: 10
PAINLEVEL_OUTOF10: 5
PAINLEVEL_OUTOF10: 6

## 2024-12-10 ASSESSMENT — PAIN DESCRIPTION - LOCATION
LOCATION: HEAD

## 2024-12-10 ASSESSMENT — PAIN DESCRIPTION - ORIENTATION: ORIENTATION: UPPER;RIGHT;LEFT;MID

## 2024-12-10 ASSESSMENT — PAIN DESCRIPTION - DESCRIPTORS
DESCRIPTORS: THROBBING
DESCRIPTORS: ACHING;POUNDING;THROBBING
DESCRIPTORS: THROBBING;DISCOMFORT

## 2024-12-11 LAB
EKG ATRIAL RATE: 107 BPM
EKG P AXIS: 65 DEGREES
EKG P-R INTERVAL: 182 MS
EKG Q-T INTERVAL: 338 MS
EKG QRS DURATION: 70 MS
EKG QTC CALCULATION (BAZETT): 451 MS
EKG R AXIS: 71 DEGREES
EKG T AXIS: 74 DEGREES
EKG VENTRICULAR RATE: 107 BPM

## 2024-12-11 PROCEDURE — 93010 ELECTROCARDIOGRAM REPORT: CPT | Performed by: INTERNAL MEDICINE

## 2024-12-12 DIAGNOSIS — R93.3 ABNORMAL COLONOSCOPY: ICD-10-CM

## 2024-12-12 LAB
ANION GAP SERPL CALCULATED.3IONS-SCNC: 15 MMOL/L (ref 7–16)
BUN BLDV-MCNC: 26 MG/DL (ref 6–23)
CALCIUM SERPL-MCNC: 9.2 MG/DL (ref 8.6–10.2)
CHLORIDE BLD-SCNC: 105 MMOL/L (ref 98–107)
CO2: 20 MMOL/L (ref 22–29)
CREAT SERPL-MCNC: 1 MG/DL (ref 0.5–1)
GFR, ESTIMATED: 61 ML/MIN/1.73M2
GLUCOSE BLD-MCNC: 98 MG/DL (ref 74–99)
POTASSIUM SERPL-SCNC: 3.6 MMOL/L (ref 3.5–5)
SODIUM BLD-SCNC: 140 MMOL/L (ref 132–146)

## 2024-12-12 NOTE — ED PROVIDER NOTES
injection 10 mg (10 mg IntraVENous Given 12/10/24 1902)   diphenhydrAMINE (BENADRYL) injection 25 mg (25 mg IntraVENous Given 12/10/24 1903)   sodium chloride 0.9 % bolus 1,000 mL (0 mLs IntraVENous Stopped 12/10/24 2250)   acetaminophen (TYLENOL) tablet 1,000 mg (1,000 mg Oral Given 12/10/24 2012)   dexAMETHasone (DECADRON) injection 10 mg (10 mg IntraVENous Given 12/10/24 2043)   ketorolac (TORADOL) injection 15 mg (15 mg IntraVENous Given 12/10/24 2042)         Is this patient to be included in the SEP-1 core measure due to severe sepsis or septic shock? No Exclusion criteria - the patient is NOT to be included for SEP-1 Core Measure due to: Infection is not suspected        Medical Decision Making/Differential Diagnosis:    CC/HPI Summary, Social Determinants of health, Records Reviewed, DDx, testing done/not done, ED Course, Reassessment, disposition considerations/shared decision making with patient, consults, disposition:            Chronic Conditions:   Past Medical History:   Diagnosis Date    Arthritis     Diabetes mellitus (HCC)     Hyperlipidemia     Hypertension     PONV (postoperative nausea and vomiting)     with anesthesia    Spinal headache     Thyroid disease        CONSULTS: (Who and What was discussed)  None    Discussion with Other Profesionals : None    Social Determinants : None    Records Reviewed : None    CC/HPI Summary, DDx, ED Course, and Reassessment:   Patient is 74-year-old female past medical history of hypertension, hyperlipidemia as well as diabetes.  Patient presents with chief complaint of headache as well as some nausea and vomiting.  Vital signs stable presentation.  On physical exam heart regular rate and rhythm, lungs with auscultation bilaterally, abdomen soft nontender no rebound or guarding.  Differential diagnose includes tension headache, migraine headache, intracranial hemorrhage, ACS, viral illness.  Laboratory work obtained EKG demonstrated no acute abnormalities CBC

## 2024-12-16 ENCOUNTER — TELEPHONE (OUTPATIENT)
Dept: SURGERY | Age: 74
End: 2024-12-16

## 2024-12-16 NOTE — TELEPHONE ENCOUNTER
Patient scheduled for CT colonography in [place] on 12/23/24. Phone call received from patient stating that she does not wish to proceed with testing. She has been suffering from migraines recently, does not think she can tolerate testing and does not want to drive to [place]. Patient denies pain/changes in bowel habits and would rather have colonoscopy/sigmoidoscopy if needed after the new year. Informed patient that Dr. Alvarado did not recommend repeat scope without imaging due stricture and inability to complete previous scope. She still wishes to cancel ct colonography at this time and will call back if she decides to reschedule.   Electronically signed by Cathie Gerber RN on 12/16/2024 at 1:39 PM

## 2025-01-09 ENCOUNTER — OFFICE VISIT (OUTPATIENT)
Dept: ENT CLINIC | Age: 75
End: 2025-01-09
Payer: MEDICARE

## 2025-01-09 ENCOUNTER — PROCEDURE VISIT (OUTPATIENT)
Dept: AUDIOLOGY | Age: 75
End: 2025-01-09
Payer: MEDICARE

## 2025-01-09 VITALS
HEIGHT: 66 IN | DIASTOLIC BLOOD PRESSURE: 82 MMHG | TEMPERATURE: 97.4 F | HEART RATE: 71 BPM | BODY MASS INDEX: 24.96 KG/M2 | WEIGHT: 155.3 LBS | RESPIRATION RATE: 18 BRPM | SYSTOLIC BLOOD PRESSURE: 138 MMHG | OXYGEN SATURATION: 97 %

## 2025-01-09 DIAGNOSIS — H69.91 DYSFUNCTION OF RIGHT EUSTACHIAN TUBE: Primary | ICD-10-CM

## 2025-01-09 DIAGNOSIS — H93.8X3 SENSATION OF FULLNESS IN BOTH EARS: Primary | ICD-10-CM

## 2025-01-09 DIAGNOSIS — H69.91 NEGATIVE MIDDLE EAR PRESSURE OF RIGHT EAR: ICD-10-CM

## 2025-01-09 PROCEDURE — 1123F ACP DISCUSS/DSCN MKR DOCD: CPT | Performed by: NURSE PRACTITIONER

## 2025-01-09 PROCEDURE — 3075F SYST BP GE 130 - 139MM HG: CPT | Performed by: NURSE PRACTITIONER

## 2025-01-09 PROCEDURE — 1159F MED LIST DOCD IN RCRD: CPT | Performed by: NURSE PRACTITIONER

## 2025-01-09 PROCEDURE — 1160F RVW MEDS BY RX/DR IN RCRD: CPT | Performed by: NURSE PRACTITIONER

## 2025-01-09 PROCEDURE — 92567 TYMPANOMETRY: CPT | Performed by: AUDIOLOGIST

## 2025-01-09 PROCEDURE — 99203 OFFICE O/P NEW LOW 30 MIN: CPT | Performed by: NURSE PRACTITIONER

## 2025-01-09 PROCEDURE — 3079F DIAST BP 80-89 MM HG: CPT | Performed by: NURSE PRACTITIONER

## 2025-01-09 RX ORDER — FLUTICASONE PROPIONATE 50 MCG
2 SPRAY, SUSPENSION (ML) NASAL DAILY
COMMUNITY
Start: 2025-01-02 | End: 2025-01-09 | Stop reason: SDUPTHER

## 2025-01-09 RX ORDER — CEFDINIR 300 MG/1
CAPSULE ORAL
COMMUNITY
Start: 2025-01-02

## 2025-01-09 RX ORDER — METOPROLOL SUCCINATE 50 MG/1
50 TABLET, EXTENDED RELEASE ORAL DAILY
COMMUNITY
Start: 2024-12-18

## 2025-01-09 RX ORDER — ATENOLOL 50 MG/1
TABLET ORAL
COMMUNITY

## 2025-01-09 RX ORDER — FLUTICASONE PROPIONATE 50 MCG
2 SPRAY, SUSPENSION (ML) NASAL DAILY
Qty: 16 G | Refills: 1 | Status: SHIPPED | OUTPATIENT
Start: 2025-01-09

## 2025-01-09 RX ORDER — BETAMETHASONE VALERATE 1.2 MG/G
CREAM TOPICAL
COMMUNITY

## 2025-01-09 RX ORDER — KETOCONAZOLE 20 MG/ML
SHAMPOO, SUSPENSION TOPICAL
COMMUNITY
Start: 2024-12-07

## 2025-01-09 ASSESSMENT — ENCOUNTER SYMPTOMS
SINUS PRESSURE: 0
SINUS PAIN: 0
STRIDOR: 0
RHINORRHEA: 0
SHORTNESS OF BREATH: 0
RESPIRATORY NEGATIVE: 1
EYES NEGATIVE: 1

## 2025-01-09 NOTE — PROGRESS NOTES
This patient was referred for tympanometric testing by EARNEST Cardoso due to ear fullness, bilaterally.    Tympanometry revealed normal middle ear peak pressure and compliance, bilaterally.  Ipsilateral acoustic reflexes were present, right ear and absent, left ear at 1000Hz.    The results were reviewed with the patient and ordering provider.     Recommendations for follow up will be made pending ordering provider consult.    Daniel Joy CCC/SAM  Audiologist  [id number]  NPI#:  [id number]      Electronically signed by Gurjit Fay on 1/9/2025 at 10:54 AM

## 2025-01-09 NOTE — PROGRESS NOTES
[facility]  Dr. Jose Lemon D.O. Ms.Ed.  New Consult       Patient Name:  Chana Enriquez  :  1950     CHIEF C/O:    Chief Complaint   Patient presents with    New Patient     New patient here today for Otitis media, unspecified, right ear, Decreased hearing of right ear.          HISTORY OBTAINED FROM:  patient    HISTORY OF PRESENT ILLNESS:       Chana is a 74 y.o. year old female, here today for:       Muffled hearing and fullness in the right ear.  Patient states for approximate 2 to 3 weeks he has noticed a muffled sensation and fullness in the right ear.  She was seen by her PCP approximately 1 week ago and started on cefdinir and Flonase for right middle ear effusion.  She states that she has not noticed any significant change to her symptoms at this time.  She states that it will periodically pop and hearing will return but typically last for a few minutes and becomes muffled again.  She does have a history of eustachian tube dysfunction with previous tube in the left ear.  She denies any chronic congestion, rhinorrhea, postnasal drainage, sinus pain or pressure.  She denies previous diagnosis of hearing loss.  She has any family history of hearing loss.  She denies any significant noise exposure history.  She denies any tinnitus or dizziness.  She denies any recent fevers.           Past Medical History:   Diagnosis Date    Arthritis     Diabetes mellitus (HCC)     Hyperlipidemia     Hypertension     PONV (postoperative nausea and vomiting)     with anesthesia    Spinal headache     Thyroid disease      Past Surgical History:   Procedure Laterality Date    BLADDER SUSPENSION  2016    BUNIONECTOMY Right 2022    TAILOR'S BUNIONECTOMY RIGHT FOOT performed by Gaetano Pereira Jr., DPM at [facility] OR    COLONOSCOPY      FINGER TRIGGER RELEASE Right 2022    TENDON SHEATH EXCISION RIGHT INDEX FINGER, TRIGGER RELEASE RIGHT INDEX performed by DONYA Branch DO at [facility]

## (undated) DEVICE — GLOVE SURG SZ 8 L12IN FNGR THK94MIL STD WHT LTX FREE

## (undated) DEVICE — SYRINGE BLB 50CC IRRIG PLIABLE FNGR FLNG GRAD FLSK DISP

## (undated) DEVICE — GOWN,SIRUS,NON REINFRCD,LARGE,SET IN SL: Brand: MEDLINE

## (undated) DEVICE — DRESSING GZ XRFRM 4X4(25/BX 6BX/CS)

## (undated) DEVICE — BANDAGE COMPR W4XL108IN WHT LAYERED NO CLSR SYN RUB ESMARCH

## (undated) DEVICE — DOUBLE BASIN SET: Brand: MEDLINE INDUSTRIES, INC.

## (undated) DEVICE — CHLORAPREP 26ML ORANGE

## (undated) DEVICE — Z DISCONTINUED USE 2275686 GLOVE SURG SZ 8 L12IN FNGR THK13MIL WHT ISOLEX POLYISOPRENE

## (undated) DEVICE — ADAPTER CLEANING PORPOISE CLEANING

## (undated) DEVICE — NEEDLE HYPO 25GA L1.5IN BLU POLYPR HUB S STL REG BVL STR

## (undated) DEVICE — SYRINGE IRRIG 60ML SFT PLIABLE BLB EZ TO GRP 1 HND USE W/

## (undated) DEVICE — SOLUTION IV IRRIG POUR BRL 0.9% SODIUM CHL 2F7124

## (undated) DEVICE — BANDAGE GZ W2XL75IN ST RAYON POLY CNFRM STRTCH LTWT

## (undated) DEVICE — SUTURE PROL SZ 6-0 L18IN NONABSORBABLE BLU L16MM PS-3 3/8 8680G

## (undated) DEVICE — PAD,ABDOMINAL,8"X10",ST,LF: Brand: MEDLINE

## (undated) DEVICE — GLOVE ORANGE PI 7 1/2   MSG9075

## (undated) DEVICE — STRIP,CLOSURE,WOUND,MEDI-STRIP,1/2X4: Brand: MEDLINE

## (undated) DEVICE — GLOVE SURG SZ 65 L12IN FNGR THK94MIL STD WHT LTX FREE

## (undated) DEVICE — GOWN,SIRUS,FABRNF,XL,20/CS: Brand: MEDLINE

## (undated) DEVICE — DRAPE 84X54IN RADIOLOGY C ARM KEYBOARD

## (undated) DEVICE — HANDLE CVR PATENTED RETENTION DISC STRL LIGHT SHLD

## (undated) DEVICE — INTENDED FOR TISSUE SEPARATION, AND OTHER PROCEDURES THAT REQUIRE A SHARP SURGICAL BLADE TO PUNCTURE OR CUT.: Brand: BARD-PARKER ® STAINLESS STEEL BLADES

## (undated) DEVICE — PRECISION THIN (9.0 X 0.38 X 25.0MM)

## (undated) DEVICE — 20 ML SYRINGE REGULAR TIP: Brand: MONOJECT

## (undated) DEVICE — BANDAGE COMPR W6INXL5YD WHT BGE POLY COT M E WRP WV HK AND

## (undated) DEVICE — NEEDLE FLTR 18GA L1.5IN MEM THK5UM BLNT DISP

## (undated) DEVICE — 6 X 9  1.75MIL 4-WALL LABGUARD: Brand: MINIGRIP COMMERCIAL LLC

## (undated) DEVICE — MARKER,SKIN,WI/RULER AND LABELS: Brand: MEDLINE

## (undated) DEVICE — GAUZE,SPONGE,4"X4",16PLY,STRL,LF,10/TRAY: Brand: MEDLINE

## (undated) DEVICE — GAUZE,SPONGE,4"X4",16PLY,XRAY,STRL,LF: Brand: MEDLINE

## (undated) DEVICE — Z CONVERTED USE 2275207 CLOTH PREP W7.5XL7.5IN 2% CHG SKIN ALC AND RNS FREE

## (undated) DEVICE — SUTURE ETHLN SZ 4-0 L18IN NONABSORBABLE BLK L19MM PS-2 3/8 1667H

## (undated) DEVICE — PREP TRAY 10X5X2: Brand: MEDLINE INDUSTRIES, INC.

## (undated) DEVICE — NEEDLE HYPO 18GA L1.5IN PNK POLYPR HUB S STL THN WALL FILL

## (undated) DEVICE — Z DISCONTINUED NO SUB IDED GLOVE SURG BEAD CUF 8 STD PF WHT STRL TRIUMPH LT LTX

## (undated) DEVICE — SYRINGE MED 50ML LUERLOCK TIP

## (undated) DEVICE — PADDING,UNDERCAST,COTTON, 4"X4YD STERILE: Brand: MEDLINE

## (undated) DEVICE — SOLUTION SURG PREP ANTIMICROBIAL 4 OZ SKIN WND EXIDINE

## (undated) DEVICE — BASIC PACK: Brand: CONVERTORS

## (undated) DEVICE — BLADE SAW W5.5XL18MM THK0.38MM CUT THK0.43MM REPL S STL SAG

## (undated) DEVICE — MASTISOL ADHESIVE LIQ 2/3ML

## (undated) DEVICE — 12 ML SYRINGE,LUER-LOCK TIP: Brand: MONOJECT

## (undated) DEVICE — HOOK LOCK LATEX FREE ELASTIC BANDAGE 2INX5YD

## (undated) DEVICE — PENCIL ES L3M BTTN SWCH HOLSTER W/ BLDE ELECTRD EDGE

## (undated) DEVICE — SOLUTION IRRIG 1000ML 09% SOD CHL USP PIC PLAS CONTAINER

## (undated) DEVICE — BRACE WLK FULL SHELL WLK M M SHOE SZ 7 - 10 FEM SHOE SZ 8 -

## (undated) DEVICE — Z INACTIVE NO ACTIVE SUPPLIER APPLICATOR MEDICATED 26 CC TINT HI-LITE ORNG STRL CHLORAPREP

## (undated) DEVICE — SPONGE GZ 4IN 4IN 4 PLY N WVN AVANT

## (undated) DEVICE — SPONGE LAP W18XL18IN WHT COT 4 PLY FLD STRUNG RADPQ DISP ST

## (undated) DEVICE — GLOVE,SURG,SENSICARE,ALOE,LF,PF,7: Brand: MEDLINE

## (undated) DEVICE — MEDI-VAC NON-CONDUCTIVE SUCTION TUBING: Brand: CARDINAL HEALTH

## (undated) DEVICE — TIBURON EXTREMITY SHEET: Brand: CONVERTORS

## (undated) DEVICE — GOWN SURG XL SMS FAB NONREINFORCED RAGLAN SLV HK LOOP CLSR

## (undated) DEVICE — 1810 FOAM BLOCK NEEDLE COUNTER: Brand: DEVON

## (undated) DEVICE — MEDI-VAC YANKAUER SUCTION HANDLE: Brand: CARDINAL HEALTH

## (undated) DEVICE — PITCHER PT 1200ML W HNDL CSR WRP

## (undated) DEVICE — Z INACTIVE USE 2863041 SPONGE GZ W4XL4IN 100% COT 16 PLY RADPQ HIGHLY ABSRB

## (undated) DEVICE — UPCHARGE KNEE X3 PATELLA STRYKER

## (undated) DEVICE — COVER,TABLE,60X90,STERILE: Brand: MEDLINE

## (undated) DEVICE — PEN: MARKING STD 100/CS: Brand: MEDICAL ACTION INDUSTRIES

## (undated) DEVICE — SMALL TEAR CROSS CUT RASP (11.0 X 5.0MM)

## (undated) DEVICE — SYRINGE MED 30ML STD CLR PLAS LUERLOCK TIP N CTRL DISP

## (undated) DEVICE — 3M™ STERI-DRAPE™ U-DRAPE 1015: Brand: STERI-DRAPE™

## (undated) DEVICE — BLADE SAW SAG NAR THCK LNG 12.5MM

## (undated) DEVICE — DRESSING ALG W2XL5IN ANTIMIC WND JUMPSTART

## (undated) DEVICE — STRYKER PERFORMANCE SERIES SAGITTAL BLADE: Brand: STRYKER PERFORMANCE SERIES

## (undated) DEVICE — TOWEL OR BLUEE 16X26IN ST 8 PACK ORB08 16X26ORTWL

## (undated) DEVICE — SPONGE,LAP,12"X12",XR,ST,5/PK,40PK/CS: Brand: MEDLINE

## (undated) DEVICE — GOWN ISOLATN REG YEL M WT MULTIPLY SIDETIE LEV 2

## (undated) DEVICE — BROACH SET: Brand: PROSTEP™ TBI

## (undated) DEVICE — GLOVE SURG 6.5 TRIFLEX SHORT WIDE FINGER

## (undated) DEVICE — SUTURE MCRYL SZ 3-0 L27IN ABSRB UD L26MM SH 1/2 CIR Y416H

## (undated) DEVICE — COVER,LIGHT HANDLE,FLX,1/PK: Brand: MEDLINE INDUSTRIES, INC.

## (undated) DEVICE — Z DISCONTINUED USE 2272124 DRAPE SURG XL N INVASIVE 2 LAYR DISP

## (undated) DEVICE — 3M™ MICROPORE™ TAPE, 1530-1: Brand: 3M™ MICROPORE™

## (undated) DEVICE — SET MAJOR INSTR ORTHO

## (undated) DEVICE — CONTAINER SPEC COLL 960ML POLYPR TRIANG GRAD INTAKE/OUTPUT

## (undated) DEVICE — SOLUTION IV IRRIG WATER 1000ML POUR BRL 2F7114

## (undated) DEVICE — ELECTRODE PT RET AD L9FT HI MOIST COND ADH HYDRGEL CORDED

## (undated) DEVICE — SHEET SUPPORT

## (undated) DEVICE — READY WET SKIN SCRUB TRAY-LF: Brand: MEDLINE INDUSTRIES, INC.

## (undated) DEVICE — Z DISCONTINUED PER MEDLINE USE 2741944 DRESSING AQUACEL 12 IN SURG W9XL30CM SIL CVR WTRPRF VIR BACT BARR ANTIMIC

## (undated) DEVICE — BANDAGE COMPR L W4INXL11YD 100% COT WVN E DBL LEN CLP CLSR

## (undated) DEVICE — HOOK LOCK LATEX FREE ELASTIC BANDAGE 3INX5YD

## (undated) DEVICE — KIT BEDSIDE REVITAL OX 500ML

## (undated) DEVICE — GLOVE ORANGE PI 7   MSG9070

## (undated) DEVICE — SUTURE VCRL SZ 2-0 L27IN ABSRB UD L26MM CT-2 1/2 CIR J269H

## (undated) DEVICE — APPLICATOR PREP 26ML 0.7% IOD POVACRYLEX 74% ISO ALC ST

## (undated) DEVICE — LUBRICANT SURG JELLY ST BACTER TUBE 4.25OZ

## (undated) DEVICE — TUBING, SUCTION, 1/4" X 10', STRAIGHT: Brand: MEDLINE

## (undated) DEVICE — BANDAGE COMPR W6INXL12FT SMOOTH FOR LIMB EXSANG ESMARCH

## (undated) DEVICE — CANNULA IV 18GA L15IN BLNT FILL LUERLOCK HUB MJCT

## (undated) DEVICE — BANDAGE,GAUZE,BULKEE II,4.5"X4.1YD,STRL: Brand: MEDLINE

## (undated) DEVICE — TOWEL,OR,DSP,ST,BLUE,STD,6/PK,12PK/CS: Brand: MEDLINE

## (undated) DEVICE — GOWN,SIRUS,POLYRNF,BRTHSLV,XLN/XL,20/CS: Brand: MEDLINE

## (undated) DEVICE — KENDALL 450 SERIES MONITORING FOAM ELECTRODE - RECTANGULAR SHAPE ( 3/PK): Brand: KENDALL

## (undated) DEVICE — MASK,FACE,MAXFLUIDPROTECT,SHIELD/ERLPS: Brand: MEDLINE

## (undated) DEVICE — BANDAGE,GAUZE,CONFORMING,3"X75",STRL,LF: Brand: MEDLINE

## (undated) DEVICE — VALVE SUCTION AIR H2O HYDR H2O JET CONN STRL ORCA POD + DISP

## (undated) DEVICE — BANDAGE COMPR W6INXL5YD SELF ADH COHESIVE CO FLX

## (undated) DEVICE — SET EXTN L14IN 1ML IV L BOR NO FLTR NO STPCOCK

## (undated) DEVICE — STANDARD HYPODERMIC NEEDLE,POLYPROPYLENE HUB: Brand: MONOJECT

## (undated) DEVICE — NDL CNTR 40CT FM MAG: Brand: MEDLINE INDUSTRIES, INC.

## (undated) DEVICE — PACK,EXTREMITY,ORTHOMAX,5/CS: Brand: MEDLINE